# Patient Record
Sex: FEMALE | Race: WHITE | NOT HISPANIC OR LATINO | Employment: OTHER | URBAN - METROPOLITAN AREA
[De-identification: names, ages, dates, MRNs, and addresses within clinical notes are randomized per-mention and may not be internally consistent; named-entity substitution may affect disease eponyms.]

---

## 2023-04-24 ENCOUNTER — OFFICE VISIT (OUTPATIENT)
Dept: PRIMARY CARE | Facility: CLINIC | Age: 88
End: 2023-04-24
Payer: MEDICARE

## 2023-04-24 ENCOUNTER — LAB (OUTPATIENT)
Dept: LAB | Facility: LAB | Age: 88
End: 2023-04-24
Payer: MEDICARE

## 2023-04-24 VITALS
OXYGEN SATURATION: 97 % | BODY MASS INDEX: 31.82 KG/M2 | WEIGHT: 179.6 LBS | HEIGHT: 63 IN | SYSTOLIC BLOOD PRESSURE: 130 MMHG | DIASTOLIC BLOOD PRESSURE: 60 MMHG | HEART RATE: 50 BPM

## 2023-04-24 DIAGNOSIS — Z00.00 MEDICARE ANNUAL WELLNESS VISIT, SUBSEQUENT: Primary | ICD-10-CM

## 2023-04-24 DIAGNOSIS — Z13.89 ENCOUNTER FOR SCREENING FOR OTHER DISORDER: ICD-10-CM

## 2023-04-24 DIAGNOSIS — H61.23 BILATERAL IMPACTED CERUMEN: ICD-10-CM

## 2023-04-24 DIAGNOSIS — Z00.00 ROUTINE GENERAL MEDICAL EXAMINATION AT HEALTH CARE FACILITY: ICD-10-CM

## 2023-04-24 DIAGNOSIS — I10 PRIMARY HYPERTENSION: ICD-10-CM

## 2023-04-24 DIAGNOSIS — R26.89 BALANCE DISORDER: ICD-10-CM

## 2023-04-24 DIAGNOSIS — R53.83 FATIGUE, UNSPECIFIED TYPE: ICD-10-CM

## 2023-04-24 DIAGNOSIS — E66.09 CLASS 1 OBESITY DUE TO EXCESS CALORIES WITH SERIOUS COMORBIDITY AND BODY MASS INDEX (BMI) OF 31.0 TO 31.9 IN ADULT: ICD-10-CM

## 2023-04-24 PROBLEM — N32.81 OVERACTIVE BLADDER: Status: ACTIVE | Noted: 2023-04-24

## 2023-04-24 PROBLEM — M19.90 OSTEOARTHRITIS: Status: ACTIVE | Noted: 2023-04-24

## 2023-04-24 PROBLEM — H26.499 POSTERIOR CAPSULAR OPACIFICATION, OBSCURING VISION: Status: ACTIVE | Noted: 2023-04-24

## 2023-04-24 PROBLEM — R32 URINARY INCONTINENCE: Status: ACTIVE | Noted: 2023-04-24

## 2023-04-24 PROBLEM — R35.1 NOCTURIA: Status: ACTIVE | Noted: 2023-04-24

## 2023-04-24 PROBLEM — E78.5 HYPERLIPIDEMIA: Status: ACTIVE | Noted: 2023-04-24

## 2023-04-24 PROBLEM — M77.8 TENDONITIS OF SHOULDER: Status: ACTIVE | Noted: 2023-04-24

## 2023-04-24 PROBLEM — H40.1190 POAG (PRIMARY OPEN-ANGLE GLAUCOMA): Status: ACTIVE | Noted: 2023-04-24

## 2023-04-24 PROBLEM — M81.0 OSTEOPOROSIS, SENILE: Status: ACTIVE | Noted: 2023-04-24

## 2023-04-24 PROBLEM — M62.81 MUSCLE WEAKNESS: Status: ACTIVE | Noted: 2023-04-24

## 2023-04-24 PROBLEM — N18.31 CHRONIC KIDNEY DISEASE, STAGE 3A (MULTI): Status: RESOLVED | Noted: 2023-04-24 | Resolved: 2023-04-24

## 2023-04-24 PROBLEM — N18.31 CHRONIC KIDNEY DISEASE, STAGE 3A (MULTI): Status: ACTIVE | Noted: 2023-04-24

## 2023-04-24 PROBLEM — M25.519 SHOULDER PAIN: Status: ACTIVE | Noted: 2023-04-24

## 2023-04-24 PROBLEM — I63.9 CEREBRAL INFARCTION (MULTI): Status: ACTIVE | Noted: 2023-04-24

## 2023-04-24 PROBLEM — M48.00 SPINAL STENOSIS: Status: ACTIVE | Noted: 2023-04-24

## 2023-04-24 PROBLEM — H91.90 HEARING LOSS: Status: ACTIVE | Noted: 2023-04-24

## 2023-04-24 PROBLEM — M54.50 LOW BACK PAIN: Status: ACTIVE | Noted: 2023-04-24

## 2023-04-24 PROBLEM — H33.312: Status: ACTIVE | Noted: 2023-04-24

## 2023-04-24 PROBLEM — H40.1131 CHRONIC OPEN ANGLE GLAUCOMA OF BOTH EYES, MILD STAGE: Status: ACTIVE | Noted: 2023-04-24

## 2023-04-24 PROBLEM — H33.319 RETINAL TEAR: Status: ACTIVE | Noted: 2023-04-24

## 2023-04-24 PROBLEM — R79.9 ABNORMAL BLOOD CHEMISTRY: Status: ACTIVE | Noted: 2023-04-24

## 2023-04-24 PROBLEM — Z97.3 WEARS READING EYEGLASSES: Status: ACTIVE | Noted: 2023-04-24

## 2023-04-24 PROBLEM — M79.89 SOFT TISSUE MASS: Status: ACTIVE | Noted: 2023-04-24

## 2023-04-24 PROBLEM — H26.499 AFTER-CATARACT WITH VISION OBSCURED: Status: ACTIVE | Noted: 2023-04-24

## 2023-04-24 PROBLEM — K21.9 ESOPHAGEAL REFLUX: Status: ACTIVE | Noted: 2023-04-24

## 2023-04-24 PROBLEM — I44.7 LEFT BUNDLE-BRANCH BLOCK: Status: ACTIVE | Noted: 2023-04-24

## 2023-04-24 PROCEDURE — 36415 COLL VENOUS BLD VENIPUNCTURE: CPT

## 2023-04-24 PROCEDURE — 1170F FXNL STATUS ASSESSED: CPT | Performed by: PHYSICIAN ASSISTANT

## 2023-04-24 PROCEDURE — G0444 DEPRESSION SCREEN ANNUAL: HCPCS | Performed by: PHYSICIAN ASSISTANT

## 2023-04-24 PROCEDURE — G0439 PPPS, SUBSEQ VISIT: HCPCS | Performed by: PHYSICIAN ASSISTANT

## 2023-04-24 PROCEDURE — 85025 COMPLETE CBC W/AUTO DIFF WBC: CPT

## 2023-04-24 PROCEDURE — 84443 ASSAY THYROID STIM HORMONE: CPT

## 2023-04-24 PROCEDURE — 83718 ASSAY OF LIPOPROTEIN: CPT

## 2023-04-24 PROCEDURE — 99397 PER PM REEVAL EST PAT 65+ YR: CPT | Performed by: PHYSICIAN ASSISTANT

## 2023-04-24 PROCEDURE — 3078F DIAST BP <80 MM HG: CPT | Performed by: PHYSICIAN ASSISTANT

## 2023-04-24 PROCEDURE — 1033F TOBACCO NONSMOKER NOR 2NDHND: CPT | Performed by: PHYSICIAN ASSISTANT

## 2023-04-24 PROCEDURE — 3075F SYST BP GE 130 - 139MM HG: CPT | Performed by: PHYSICIAN ASSISTANT

## 2023-04-24 PROCEDURE — 1036F TOBACCO NON-USER: CPT | Performed by: PHYSICIAN ASSISTANT

## 2023-04-24 PROCEDURE — 1160F RVW MEDS BY RX/DR IN RCRD: CPT | Performed by: PHYSICIAN ASSISTANT

## 2023-04-24 PROCEDURE — 80053 COMPREHEN METABOLIC PANEL: CPT

## 2023-04-24 PROCEDURE — 1159F MED LIST DOCD IN RCRD: CPT | Performed by: PHYSICIAN ASSISTANT

## 2023-04-24 PROCEDURE — 1123F ACP DISCUSS/DSCN MKR DOCD: CPT | Performed by: PHYSICIAN ASSISTANT

## 2023-04-24 PROCEDURE — 82465 ASSAY BLD/SERUM CHOLESTEROL: CPT

## 2023-04-24 RX ORDER — OMEPRAZOLE 20 MG/1
1 CAPSULE, DELAYED RELEASE ORAL DAILY
COMMUNITY
End: 2023-11-18 | Stop reason: HOSPADM

## 2023-04-24 RX ORDER — BIMATOPROST 0.1 MG/ML
1 SOLUTION/ DROPS OPHTHALMIC NIGHTLY
COMMUNITY

## 2023-04-24 RX ORDER — ASPIRIN 81 MG/1
1 TABLET ORAL DAILY
COMMUNITY
Start: 2013-07-17

## 2023-04-24 RX ORDER — FERROUS SULFATE 325(65) MG
1 TABLET ORAL DAILY
COMMUNITY
Start: 2019-05-14 | End: 2023-11-18 | Stop reason: HOSPADM

## 2023-04-24 RX ORDER — AMLODIPINE BESYLATE 5 MG/1
1 TABLET ORAL DAILY
COMMUNITY
Start: 2016-11-01 | End: 2023-09-24 | Stop reason: ALTCHOICE

## 2023-04-24 RX ORDER — DORZOLAMIDE/TIMOLOL/PF 2 %-0.5 %
DROPS OPHTHALMIC (EYE)
COMMUNITY
End: 2023-11-01

## 2023-04-24 RX ORDER — LISINOPRIL AND HYDROCHLOROTHIAZIDE 20; 25 MG/1; MG/1
1 TABLET ORAL DAILY
COMMUNITY
Start: 2019-11-20 | End: 2023-08-14 | Stop reason: HOSPADM

## 2023-04-24 RX ORDER — CALCIUM CARBONATE 600 MG
1 TABLET ORAL DAILY
COMMUNITY
Start: 2019-05-14 | End: 2023-11-18 | Stop reason: HOSPADM

## 2023-04-24 RX ORDER — ATORVASTATIN CALCIUM 40 MG/1
1 TABLET, FILM COATED ORAL DAILY
COMMUNITY
Start: 2013-07-17 | End: 2023-11-18 | Stop reason: HOSPADM

## 2023-04-24 RX ORDER — METOPROLOL SUCCINATE 50 MG/1
50 TABLET, EXTENDED RELEASE ORAL DAILY
COMMUNITY
End: 2023-08-14 | Stop reason: HOSPADM

## 2023-04-24 ASSESSMENT — ENCOUNTER SYMPTOMS
LOSS OF SENSATION IN FEET: 0
DEPRESSION: 0
OCCASIONAL FEELINGS OF UNSTEADINESS: 0

## 2023-04-24 ASSESSMENT — ACTIVITIES OF DAILY LIVING (ADL)
DRESSING: INDEPENDENT
TAKING_MEDICATION: INDEPENDENT
MANAGING_FINANCES: INDEPENDENT
GROCERY_SHOPPING: INDEPENDENT
DOING_HOUSEWORK: INDEPENDENT
BATHING: INDEPENDENT

## 2023-04-24 ASSESSMENT — PATIENT HEALTH QUESTIONNAIRE - PHQ9
1. LITTLE INTEREST OR PLEASURE IN DOING THINGS: NOT AT ALL
SUM OF ALL RESPONSES TO PHQ9 QUESTIONS 1 AND 2: 0
2. FEELING DOWN, DEPRESSED OR HOPELESS: NOT AT ALL

## 2023-04-24 NOTE — PROGRESS NOTES
"  Subjective   Reason for Visit: Angela Ayala is an 89 y.o. female here for a Medicare Wellness visit.     Past Medical, Surgical, and Family History reviewed and updated in chart.    Reviewed all medications by prescribing practitioner or clinical pharmacist (such as prescriptions, OTCs, herbal therapies and supplements) and documented in the medical record.    HPI 89-year-old female presenting for Medicare wellness visit.  Generally doing very well.  No complaints.    HTN, HLD: Compliant with amlodipine 5 mg, lisinopril-HCTZ 20-25 mg, metoprolol succinate 50 mg.  Also on atorvastatin 40 mg and 81 mg ASA.  She does check BP at home, was 117/80/90 this AM. Denies any headache, dizziness, chest pain, SOB/AMBROCIO, palpitations, LE edema.   Follows with cardiology, last seen 1/31/2023.    Glaucoma: Stable on Lumigan and dorzolamide-timolol eyedrops.  Last saw Dr. Jimenez 2/16/2023.    Health maintenance:  Immunizations:  -Flu: Refused  -Pneumococcal: Refused  -Shingrix: Refused  -Tdap: Refused  DEXA due (last 2019)- refused  Has healthcare POA and living well    Last MCR / CPE: 4/24/2023 (MCR ADV)    Patient Care Team:  Linda Prather PA-C as PCP - General (Family Medicine)  Christopher D'Amico, DO as PCP - [employer]vantage PCP     Objective   Vitals:  /60   Pulse 50   Ht 1.6 m (5' 3\")   Wt 81.5 kg (179 lb 9.6 oz)   SpO2 97%   BMI 31.81 kg/m²       Physical Exam  Vitals reviewed.   Constitutional:       General: She is not in acute distress.     Appearance: Normal appearance.   HENT:      Head: Normocephalic and atraumatic.      Right Ear: Tympanic membrane, ear canal and external ear normal. There is impacted cerumen.      Left Ear: Tympanic membrane, ear canal and external ear normal. There is impacted cerumen.      Nose: Nose normal. No congestion or rhinorrhea.      Mouth/Throat:      Mouth: Mucous membranes are moist.      Pharynx: Oropharynx is clear. No oropharyngeal exudate or posterior " oropharyngeal erythema.   Eyes:      General: No scleral icterus.        Right eye: No discharge.         Left eye: No discharge.      Extraocular Movements: Extraocular movements intact.      Conjunctiva/sclera: Conjunctivae normal.      Pupils: Pupils are equal, round, and reactive to light.   Cardiovascular:      Rate and Rhythm: Normal rate and regular rhythm.      Heart sounds: Normal heart sounds. No murmur heard.     No friction rub. No gallop.   Pulmonary:      Effort: Pulmonary effort is normal. No respiratory distress.      Breath sounds: Normal breath sounds. No stridor. No wheezing, rhonchi or rales.   Abdominal:      General: Bowel sounds are normal. There is no distension.      Palpations: Abdomen is soft. There is no mass.      Tenderness: There is no abdominal tenderness. There is no right CVA tenderness or left CVA tenderness.   Musculoskeletal:         General: Normal range of motion.      Cervical back: Normal range of motion and neck supple.      Right lower leg: No edema.      Left lower leg: No edema.   Skin:     General: Skin is warm and dry.      Findings: No rash.   Neurological:      General: No focal deficit present.      Mental Status: She is alert and oriented to person, place, and time. Mental status is at baseline.      Cranial Nerves: No cranial nerve deficit.      Gait: Gait normal.   Psychiatric:         Mood and Affect: Mood normal.         Behavior: Behavior normal.         Assessment/Plan   Problem List Items Addressed This Visit       Balance disorder    Current Assessment & Plan     I recommend ambulating with a walker/rollator for added stability.  Avoid loose cords and rugs in the home which could poses as trip hazards.  Recommend physical therapy for strengthening and balance training, deferred         Fatigue    Relevant Orders    CBC and Auto Differential    Hypertension    Current Assessment & Plan     Well-controlled.  Continue medications unchanged.  Follow strict DASH  diet and exercise regularly as tolerated.  Follow with cardiology         Medicare annual wellness visit, subsequent - Primary    Routine general medical examination at health care facility    Relevant Orders    Lipid Panel Non-Fasting    Comprehensive metabolic panel    CBC and Auto Differential    Tsh With Reflex To Free T4 If Abnormal    Encounter for screening for other disorder    RESOLVED: Class 1 obesity due to excess calories with serious comorbidity and body mass index (BMI) of 31.0 to 31.9 in adult    Bilateral impacted cerumen    Current Assessment & Plan     Patient not interested in having ears flushed at this time.  States she will not use Debrox drops if prescribed.  She is to call the office if she reconsiders

## 2023-04-25 LAB
ALANINE AMINOTRANSFERASE (SGPT) (U/L) IN SER/PLAS: 12 U/L (ref 7–45)
ALBUMIN (G/DL) IN SER/PLAS: 3.8 G/DL (ref 3.4–5)
ALKALINE PHOSPHATASE (U/L) IN SER/PLAS: 100 U/L (ref 33–136)
ANION GAP IN SER/PLAS: 14 MMOL/L (ref 10–20)
ASPARTATE AMINOTRANSFERASE (SGOT) (U/L) IN SER/PLAS: 14 U/L (ref 9–39)
BASOPHILS (10*3/UL) IN BLOOD BY AUTOMATED COUNT: 0.04 X10E9/L (ref 0–0.1)
BASOPHILS/100 LEUKOCYTES IN BLOOD BY AUTOMATED COUNT: 0.6 % (ref 0–2)
BILIRUBIN TOTAL (MG/DL) IN SER/PLAS: 0.5 MG/DL (ref 0–1.2)
CALCIUM (MG/DL) IN SER/PLAS: 9.2 MG/DL (ref 8.6–10.6)
CARBON DIOXIDE, TOTAL (MMOL/L) IN SER/PLAS: 27 MMOL/L (ref 21–32)
CHLORIDE (MMOL/L) IN SER/PLAS: 105 MMOL/L (ref 98–107)
CHOLESTEROL (MG/DL) IN SER/PLAS: 170 MG/DL (ref 0–199)
CHOLESTEROL IN HDL (MG/DL) IN SER/PLAS: 56.9 MG/DL
CHOLESTEROL/HDL RATIO: 3
CREATININE (MG/DL) IN SER/PLAS: 0.95 MG/DL (ref 0.5–1.05)
EOSINOPHILS (10*3/UL) IN BLOOD BY AUTOMATED COUNT: 0.08 X10E9/L (ref 0–0.4)
EOSINOPHILS/100 LEUKOCYTES IN BLOOD BY AUTOMATED COUNT: 1.2 % (ref 0–6)
ERYTHROCYTE DISTRIBUTION WIDTH (RATIO) BY AUTOMATED COUNT: 14.6 % (ref 11.5–14.5)
ERYTHROCYTE MEAN CORPUSCULAR HEMOGLOBIN CONCENTRATION (G/DL) BY AUTOMATED: 32.1 G/DL (ref 32–36)
ERYTHROCYTE MEAN CORPUSCULAR VOLUME (FL) BY AUTOMATED COUNT: 90 FL (ref 80–100)
ERYTHROCYTES (10*6/UL) IN BLOOD BY AUTOMATED COUNT: 4.59 X10E12/L (ref 4–5.2)
GFR FEMALE: 55 ML/MIN/1.73M2
GLUCOSE (MG/DL) IN SER/PLAS: 89 MG/DL (ref 74–99)
HEMATOCRIT (%) IN BLOOD BY AUTOMATED COUNT: 41.4 % (ref 36–46)
HEMOGLOBIN (G/DL) IN BLOOD: 13.3 G/DL (ref 12–16)
IMMATURE GRANULOCYTES/100 LEUKOCYTES IN BLOOD BY AUTOMATED COUNT: 0.1 % (ref 0–0.9)
LEUKOCYTES (10*3/UL) IN BLOOD BY AUTOMATED COUNT: 6.9 X10E9/L (ref 4.4–11.3)
LYMPHOCYTES (10*3/UL) IN BLOOD BY AUTOMATED COUNT: 1.94 X10E9/L (ref 0.8–3)
LYMPHOCYTES/100 LEUKOCYTES IN BLOOD BY AUTOMATED COUNT: 28.2 % (ref 13–44)
MONOCYTES (10*3/UL) IN BLOOD BY AUTOMATED COUNT: 0.58 X10E9/L (ref 0.05–0.8)
MONOCYTES/100 LEUKOCYTES IN BLOOD BY AUTOMATED COUNT: 8.4 % (ref 2–10)
NEUTROPHILS (10*3/UL) IN BLOOD BY AUTOMATED COUNT: 4.24 X10E9/L (ref 1.6–5.5)
NEUTROPHILS/100 LEUKOCYTES IN BLOOD BY AUTOMATED COUNT: 61.5 % (ref 40–80)
NON-HDL CHOLESTEROL: 113 MG/DL
NRBC (PER 100 WBCS) BY AUTOMATED COUNT: 0 /100 WBC (ref 0–0)
PLATELETS (10*3/UL) IN BLOOD AUTOMATED COUNT: 293 X10E9/L (ref 150–450)
POTASSIUM (MMOL/L) IN SER/PLAS: 4.8 MMOL/L (ref 3.5–5.3)
PROTEIN TOTAL: 6 G/DL (ref 6.4–8.2)
SODIUM (MMOL/L) IN SER/PLAS: 141 MMOL/L (ref 136–145)
THYROTROPIN (MIU/L) IN SER/PLAS BY DETECTION LIMIT <= 0.05 MIU/L: 1.96 MIU/L (ref 0.44–3.98)
UREA NITROGEN (MG/DL) IN SER/PLAS: 35 MG/DL (ref 6–23)

## 2023-04-25 NOTE — ASSESSMENT & PLAN NOTE
Patient not interested in having ears flushed at this time.  States she will not use Debrox drops if prescribed.  She is to call the office if she reconsiders

## 2023-04-25 NOTE — ASSESSMENT & PLAN NOTE
I recommend ambulating with a walker/rollator for added stability.  Avoid loose cords and rugs in the home which could poses as trip hazards.  Recommend physical therapy for strengthening and balance training, deferred

## 2023-04-25 NOTE — ASSESSMENT & PLAN NOTE
Well-controlled.  Continue medications unchanged.  Follow strict DASH diet and exercise regularly as tolerated.  Follow with cardiology

## 2023-04-26 NOTE — RESULT ENCOUNTER NOTE
Lab results are back and all look fairly stable  Thyroid levels, cholesterol panel, blood counts all look great.  Kidney function is slightly decreased from prior draw but acceptable for age.  Ensure she is drinking adequate water and not taking excessive NSAIDs.  We will continue to monitor

## 2023-04-28 ENCOUNTER — TELEPHONE (OUTPATIENT)
Dept: PRIMARY CARE | Facility: CLINIC | Age: 88
End: 2023-04-28
Payer: MEDICARE

## 2023-04-28 NOTE — TELEPHONE ENCOUNTER
Left VM to call office for results    ----- Message from Linda Prather PA-C sent at 4/26/2023  7:29 PM EDT -----  Lab results are back and all look fairly stable  Thyroid levels, cholesterol panel, blood counts all look great.  Kidney function is slightly decreased from prior draw but acceptable for age.  Ensure she is drinking adequate water and not taking excessive NSAIDs.  We will continue to monitor

## 2023-05-01 ENCOUNTER — TELEPHONE (OUTPATIENT)
Dept: PRIMARY CARE | Facility: CLINIC | Age: 88
End: 2023-05-01
Payer: MEDICARE

## 2023-05-01 NOTE — TELEPHONE ENCOUNTER
Pt informed of results.    ----- Message from Linda Prather PA-C sent at 4/26/2023  7:29 PM EDT -----  Lab results are back and all look fairly stable  Thyroid levels, cholesterol panel, blood counts all look great.  Kidney function is slightly decreased from prior draw but acceptable for age.  Ensure she is drinking adequate water and not taking excessive NSAIDs.  We will continue to monitor

## 2023-08-14 ENCOUNTER — PATIENT OUTREACH (OUTPATIENT)
Dept: PRIMARY CARE | Facility: CLINIC | Age: 88
End: 2023-08-14
Payer: MEDICARE

## 2023-08-14 DIAGNOSIS — I48.91 ATRIAL FIBRILLATION WITH RVR (MULTI): ICD-10-CM

## 2023-08-14 RX ORDER — METOPROLOL SUCCINATE 100 MG/1
100 TABLET, EXTENDED RELEASE ORAL
Qty: 30 TABLET | Refills: 0 | COMMUNITY
Start: 2023-08-11 | End: 2023-09-24 | Stop reason: SDUPTHER

## 2023-08-14 RX ORDER — LISINOPRIL AND HYDROCHLOROTHIAZIDE 10; 12.5 MG/1; MG/1
1 TABLET ORAL
Qty: 30 TABLET | Refills: 0 | COMMUNITY
Start: 2023-08-11 | End: 2023-10-03 | Stop reason: ALTCHOICE

## 2023-08-14 NOTE — PROGRESS NOTES
Discharge Facility: [facility] [place]  Discharge Diagnosis: Atrial fibrillation with RVR  Admission Date: 8/9/2023  Discharge Date: 8/11/2023    PCP Appointment Date: needs scheduled  Specialist Appointment Date: Cardiology to be scheduled  Hospital Encounter and Summary: Linked  See discharge assessment below for further details    Engagement  Call Start Time: 1243 (8/14/2023 12:37 PM)    Medications  Medications reviewed with patient/caregiver?: Yes (Reviewed medication changes: Eliquis 5 mg twice daily, Metoprolol increased to 100 mg daily. Zetoretic decreased to 10-25 mg daily.) (8/14/2023 12:37 PM)  Is the patient having any side effects they believe may be caused by any medication additions or changes?: No (8/14/2023 12:37 PM)  Does the patient have all medications ordered at discharge?: Yes (8/14/2023 12:37 PM)  Care Management Interventions: No intervention needed (8/14/2023 12:37 PM)  Is the patient taking all medications as directed (includes completed medication regime)?: Yes (8/14/2023 12:37 PM)    Appointments  Does the patient have a primary care provider?: Yes (8/14/2023 12:37 PM)  Care Management Interventions: Verified appointment date/time/provider (Message sent to office to schedule hospital FU. Pt has a scheduled visit on 9/24/2023.) (8/14/2023 12:37 PM)  Has the patient kept scheduled appointments due by today?: Not applicable (8/14/2023 12:37 PM)    Self Management  What is the home health agency?: [facility] (8/14/2023 12:37 PM)  Has home health visited the patient within 72 hours of discharge?: No (Pt was contacted to schedule visit.) (8/14/2023 12:37 PM)  What Durable Medical Equipment (DME) was ordered?: N/A (8/14/2023 12:37 PM)    Patient Teaching  Does the patient have access to their discharge instructions?: Yes (8/14/2023 12:37 PM)  Care Management Interventions: Reviewed instructions with patient (8/14/2023 12:37 PM)  What is the patient's perception of  their health status since discharge?: Improving (8/14/2023 12:37 PM)  Is the patient/caregiver able to teach back the hierarchy of who to call/visit for symptoms/problems? PCP, Specialist, Home Health nurse, Urgent Care, ED, 911: Yes (8/14/2023 12:37 PM)    Wrap Up  Wrap Up Additional Comments: Presented to ED with complaints of fatigue and shortness of breath worse with exertion going on for a week. Denied chest pain, fever, cough. EKG in ED showed atrial fibrillation with RVR with rate 177. Pt treated with cardizem and heparin gtts and cardiology consulted. Discharged to home with [facility]. Metoprolol increased to 100 mg daily. Zetoretic decreased to 10-25 mg daily. Eliquis 5 mg twice daily added. (8/14/2023 12:37 PM)

## 2023-08-28 ENCOUNTER — PATIENT OUTREACH (OUTPATIENT)
Dept: PRIMARY CARE | Facility: CLINIC | Age: 88
End: 2023-08-28
Payer: MEDICARE

## 2023-08-28 NOTE — PROGRESS NOTES
Unable to reach patient for call back after patient's hospitalization. LVM with call back number for patient to call if needed.

## 2023-09-07 PROCEDURE — G0180 MD CERTIFICATION HHA PATIENT: HCPCS | Performed by: PHYSICIAN ASSISTANT

## 2023-09-08 ENCOUNTER — PATIENT OUTREACH (OUTPATIENT)
Dept: PRIMARY CARE | Facility: CLINIC | Age: 88
End: 2023-09-08
Payer: MEDICARE

## 2023-09-08 NOTE — PROGRESS NOTES
Call placed regarding one month post discharge follow up call. At time of outreach call, the patient states she is still having trouble walking and her endurance is poor. She states home care therapist continue to work with her, but she is not making the progress she would like. Reminded pt of upcoming appt with Linda Prather CNP on 9/19/2023 11:40. Pt states she may have to cancel if she is not feeling strong enough to go out.

## 2023-09-19 ENCOUNTER — OFFICE VISIT (OUTPATIENT)
Dept: PRIMARY CARE | Facility: CLINIC | Age: 88
End: 2023-09-19
Payer: MEDICARE

## 2023-09-19 VITALS
WEIGHT: 189 LBS | BODY MASS INDEX: 33.48 KG/M2 | DIASTOLIC BLOOD PRESSURE: 86 MMHG | SYSTOLIC BLOOD PRESSURE: 131 MMHG | HEART RATE: 100 BPM | OXYGEN SATURATION: 95 %

## 2023-09-19 DIAGNOSIS — R60.0 BILATERAL LOWER EXTREMITY EDEMA: ICD-10-CM

## 2023-09-19 DIAGNOSIS — S81.802A LEG WOUND, LEFT, INITIAL ENCOUNTER: ICD-10-CM

## 2023-09-19 DIAGNOSIS — I48.91 ATRIAL FIBRILLATION, UNSPECIFIED TYPE (MULTI): ICD-10-CM

## 2023-09-19 DIAGNOSIS — Z09 HOSPITAL DISCHARGE FOLLOW-UP: Primary | ICD-10-CM

## 2023-09-19 PROBLEM — I63.9 STROKE (MULTI): Status: ACTIVE | Noted: 2023-09-19

## 2023-09-19 PROBLEM — R26.81 GAIT INSTABILITY: Status: ACTIVE | Noted: 2018-05-22

## 2023-09-19 PROBLEM — M81.0 OSTEOPOROSIS: Status: ACTIVE | Noted: 2023-09-19

## 2023-09-19 PROCEDURE — 1160F RVW MEDS BY RX/DR IN RCRD: CPT | Performed by: PHYSICIAN ASSISTANT

## 2023-09-19 PROCEDURE — 99214 OFFICE O/P EST MOD 30 MIN: CPT | Performed by: PHYSICIAN ASSISTANT

## 2023-09-19 PROCEDURE — 1159F MED LIST DOCD IN RCRD: CPT | Performed by: PHYSICIAN ASSISTANT

## 2023-09-19 PROCEDURE — 1036F TOBACCO NON-USER: CPT | Performed by: PHYSICIAN ASSISTANT

## 2023-09-19 PROCEDURE — 1126F AMNT PAIN NOTED NONE PRSNT: CPT | Performed by: PHYSICIAN ASSISTANT

## 2023-09-19 PROCEDURE — 3075F SYST BP GE 130 - 139MM HG: CPT | Performed by: PHYSICIAN ASSISTANT

## 2023-09-19 PROCEDURE — 3079F DIAST BP 80-89 MM HG: CPT | Performed by: PHYSICIAN ASSISTANT

## 2023-09-19 RX ORDER — MUPIROCIN 20 MG/G
OINTMENT TOPICAL 3 TIMES DAILY
Qty: 22 G | Refills: 0 | Status: SHIPPED | OUTPATIENT
Start: 2023-09-19 | End: 2023-09-29

## 2023-09-19 RX ORDER — DORZOLAMIDE HYDROCHLORIDE AND TIMOLOL MALEATE 20; 5 MG/ML; MG/ML
1 SOLUTION/ DROPS OPHTHALMIC
COMMUNITY
Start: 2023-01-26

## 2023-09-19 NOTE — PATIENT INSTRUCTIONS
Concern for fluid overload 2/2 Afib:   -History of elevated BNP (8,640)  -Per [facility] records, chest X-ray clear for pulmonary edema  -No echocardiogram for review  -Encouraged daily weight checks and a low sodium diet  -Prescription for compression stockings provided, [facility]  -Patient to discuss with cardiology today: consider discontinuation of lisinopril- hydrochlorothiazide 10-12.5mg and initiation of lisinopril 10mg AND furosemide 20mg/40mg.     Open wound on LLE:   -Monitor closely for any change (increasing redness, abnormal warmth, pain near skin break, purulent drainage, etc). Call the office at earliest sign of change for urgent referral to  wound care  -Apply topical mupirocin ointment as directed. Prescription sent to pharmacy  -Apply compression stockings daily and remove at night  -Elevate lower extremities when not ambulating  -My office will call [facility] and try to add on wound care. If not able to add on, perform wound changes daily or when dressing is soiled

## 2023-09-19 NOTE — PROGRESS NOTES
Subjective   Patient ID: Angela Ayala is a 89 y.o. female who presents for No chief complaint on file..    HPI 89yo female presenting with her son for a hospital follow up visit. Recall she was recently admitted to [facility] from 8/9/23 - 8/11/23 for shortness of breath and new onset Afib with RVR. While admitted she was followed by cardiology. Eventually was stabilized and discharged. Medication changes were: start eliquis 5mg BID, decrease lisinopril-HCTZ to 10-12.5 mg, and increase metoprolol metoprolol succinate to 100mg.  No echocardiogram was performed while admitted.    Since discharge she admits to feeling weaker than her baseline and short of breath.  Denies any cough, wheezing, chest tightness.  Endorses BLE edema and a LLE wound, which is 2/2 and recent injury.  Per patient's son, her LLE has been weeping a serous/watery fluid since the injury.  Overall the area is improving however today it is slightly more red than it has been previously.  Patient denies any abnormal warmth or pain in the area.  No purulent discharge or bleeding.  No foul odor.  Patient's son has been applying Ace bandage wraps bilaterally since discharge, which typically do help.  He is concerned however that the LLE wound is not healing.    Of note, she did not take any of her medications today.  She is voiding urine and bowels appropriately.    Objective   /86   Pulse 100   Wt 85.7 kg (189 lb)   SpO2 95%   BMI 33.48 kg/m²     Physical Exam  Vitals reviewed.   Constitutional:       General: She is not in acute distress.     Appearance: Normal appearance. She is not ill-appearing.   HENT:      Head: Normocephalic and atraumatic.   Eyes:      General: No scleral icterus.     Extraocular Movements: Extraocular movements intact.      Conjunctiva/sclera: Conjunctivae normal.      Pupils: Pupils are equal, round, and reactive to light.   Cardiovascular:      Rate and Rhythm: Normal rate and regular rhythm.       Heart sounds: Normal heart sounds. No murmur heard.     No friction rub. No gallop.   Pulmonary:      Effort: Pulmonary effort is normal. No respiratory distress.      Breath sounds: Normal breath sounds. No stridor. No wheezing, rhonchi or rales.   Musculoskeletal:      Cervical back: Normal range of motion.      Right lower leg: Edema (2+ pitting) present.      Left lower leg: Edema (2+ pitting) present.   Skin:     General: Skin is warm and dry.      Findings: Lesion (2 small skin breaks to the L anterior shin. Mild surrounding erythma, but no abnormal warmth, tenderness with palpation, or streaking redness. Serous fluid weeping and on ABD pad. No foul odor.) present.   Neurological:      Mental Status: She is alert and oriented to person, place, and time. Mental status is at baseline.      Cranial Nerves: No cranial nerve deficit.      Gait: Gait normal.   Psychiatric:         Mood and Affect: Mood normal.         Behavior: Behavior normal.       Assessment/Plan   Problem List Items Addressed This Visit       Leg wound, left, initial encounter     -Monitor closely for any change (increasing redness, abnormal warmth, pain near skin break, purulent drainage, etc). Call the office at earliest sign of change for urgent referral to  wound care  -Apply topical mupirocin ointment as directed. Prescription sent to pharmacy  -Apply compression stockings daily and remove at night  -Elevate lower extremities when not ambulating  -My office will call [facility] and try to add on wound care. If not able to add on, perform wound changes daily or when dressing is soiled         Relevant Medications    mupirocin (Bactroban) 2 % ointment    Bilateral lower extremity edema     -Apply compression stockings daily and remove at night. [facility]  -Elevate lower extremities when not ambulating  -Limit any sodium intake         Relevant Orders    Compression Stockings 15-20 mmHg    Atrial  fibrillation (CMS/HCC)     Continue eliquis 5mg BID and metoprolol succinate 100mg.    Discussed my concern for fluid overload 2/2 Afib:  -History of elevated BNP (8,640)  -Per [facility] records, chest X-ray clear for pulmonary edema  -No echocardiogram for review  -Encouraged daily weight checks and a low sodium diet  -Prescription for compression stockings provided, [facility]  -Patient to discuss with cardiology today: consider discontinuation of lisinopril- hydrochlorothiazide 10-12.5mg and initiation of lisinopril 10mg AND furosemide 20mg/40mg.          Hospital discharge follow-up - Primary        Follow-up in 1 to 2 months or sooner as needed

## 2023-09-23 NOTE — ASSESSMENT & PLAN NOTE
-Apply compression stockings daily and remove at night. [facility]  -Elevate lower extremities when not ambulating  -Limit any sodium intake

## 2023-09-23 NOTE — ASSESSMENT & PLAN NOTE
Continue eliquis 5mg BID and metoprolol succinate 100mg.    Discussed my concern for fluid overload 2/2 Afib:  -History of elevated BNP (8,640)  -Per [facility] records, chest X-ray clear for pulmonary edema  -No echocardiogram for review  -Encouraged daily weight checks and a low sodium diet  -Prescription for compression stockings provided, [facility]  -Patient to discuss with cardiology today: consider discontinuation of lisinopril- hydrochlorothiazide 10-12.5mg and initiation of lisinopril 10mg AND furosemide 20mg/40mg.

## 2023-09-23 NOTE — ASSESSMENT & PLAN NOTE
-Monitor closely for any change (increasing redness, abnormal warmth, pain near skin break, purulent drainage, etc). Call the office at earliest sign of change for urgent referral to  wound care  -Apply topical mupirocin ointment as directed. Prescription sent to pharmacy  -Apply compression stockings daily and remove at night  -Elevate lower extremities when not ambulating  -My office will call [facility] and try to add on wound care. If not able to add on, perform wound changes daily or when dressing is soiled

## 2023-09-24 DIAGNOSIS — I48.91 ATRIAL FIBRILLATION WITH RVR (MULTI): Primary | ICD-10-CM

## 2023-09-24 RX ORDER — METOPROLOL SUCCINATE 100 MG/1
100 TABLET, EXTENDED RELEASE ORAL
Qty: 90 TABLET | Refills: 1 | Status: SHIPPED | OUTPATIENT
Start: 2023-09-24 | End: 2023-11-18 | Stop reason: HOSPADM

## 2023-09-25 ENCOUNTER — HOSPITAL ENCOUNTER (INPATIENT)
Dept: DATA CONVERSION | Facility: HOSPITAL | Age: 88
LOS: 4 days | Discharge: HOME | End: 2023-09-29
Attending: INTERNAL MEDICINE | Admitting: INTERNAL MEDICINE
Payer: MEDICARE

## 2023-09-25 DIAGNOSIS — R07.9 CHEST PAIN, UNSPECIFIED: ICD-10-CM

## 2023-09-25 LAB
ALBUMIN SERPL-MCNC: 3.5 GM/DL (ref 3.5–5)
ALBUMIN/GLOB SERPL: 1.8 RATIO (ref 1.5–3)
ALP BLD-CCNC: 107 U/L (ref 35–125)
ALT SERPL-CCNC: 21 U/L (ref 5–40)
ANION GAP SERPL CALCULATED.3IONS-SCNC: 12 MMOL/L (ref 0–19)
ANTICOAGULANT: NORMAL
ANTICOAGULANT: NORMAL
APTT PPP: 25.4 SEC (ref 22–32.5)
AST SERPL-CCNC: 24 U/L (ref 5–40)
BASOPHILS # BLD AUTO: 0.03 K/UL (ref 0–0.22)
BASOPHILS NFR BLD AUTO: 0.4 % (ref 0–1)
BILIRUB SERPL-MCNC: 0.7 MG/DL (ref 0.1–1.2)
BUN SERPL-MCNC: 33 MG/DL (ref 8–25)
BUN/CREAT SERPL: 23.6 RATIO (ref 8–21)
CALCIUM SERPL-MCNC: 9.1 MG/DL (ref 8.5–10.4)
CHLORIDE SERPL-SCNC: 99 MMOL/L (ref 97–107)
CO2 SERPL-SCNC: 25 MMOL/L (ref 24–31)
CREAT SERPL-MCNC: 1.4 MG/DL (ref 0.4–1.6)
DEPRECATED RDW RBC AUTO: 54 FL (ref 37–54)
DIFFERENTIAL METHOD BLD: ABNORMAL
EOSINOPHIL # BLD AUTO: 0.07 K/UL (ref 0–0.45)
EOSINOPHIL NFR BLD: 1 % (ref 0–3)
ERYTHROCYTE [DISTWIDTH] IN BLOOD BY AUTOMATED COUNT: 17 % (ref 11.7–15)
GFR SERPL CREATININE-BSD FRML MDRD: 35 ML/MIN/1.73 M2
GLOBULIN SER-MCNC: 2 G/DL (ref 1.9–3.7)
GLUCOSE SERPL-MCNC: 133 MG/DL (ref 65–99)
HCT VFR BLD AUTO: 45.1 % (ref 36–44)
HGB BLD-MCNC: 14.6 GM/DL (ref 12–15)
HS TROPONIN T DELTA: 177 (ref 0–4)
HS TROPONIN T DELTA: ABNORMAL (ref 0–4)
IMM GRANULOCYTES # BLD AUTO: 0.02 K/UL (ref 0–0.1)
INR PPP: 1.1 (ref 0.86–1.16)
LYMPHOCYTES # BLD AUTO: 1.77 K/UL (ref 1.2–3.2)
LYMPHOCYTES NFR BLD MANUAL: 24.8 % (ref 20–40)
MCH RBC QN AUTO: 28.5 PG (ref 26–34)
MCHC RBC AUTO-ENTMCNC: 32.4 % (ref 31–37)
MCV RBC AUTO: 88.1 FL (ref 80–100)
MONOCYTES # BLD AUTO: 0.7 K/UL (ref 0–0.8)
MONOCYTES NFR BLD MANUAL: 9.8 % (ref 0–8)
NEUTROPHILS # BLD AUTO: 4.56 K/UL
NEUTROPHILS # BLD AUTO: 4.56 K/UL (ref 1.8–7.7)
NEUTROPHILS.IMMATURE NFR BLD: 0.3 % (ref 0–1)
NEUTS SEG NFR BLD: 63.7 % (ref 50–70)
NRBC BLD-RTO: 0 /100 WBC
NT-PROBNP SERPL-MCNC: ABNORMAL PG/ML (ref 0–624)
PLATELET # BLD AUTO: 218 K/UL (ref 150–450)
PMV BLD AUTO: 10 CU (ref 7–12.6)
POTASSIUM SERPL-SCNC: 4.7 MMOL/L (ref 3.4–5.1)
PROT SERPL-MCNC: 5.5 G/DL (ref 5.9–7.9)
PROTHROMBIN TIME: 11.4 SEC (ref 9.3–12.7)
RBC # BLD AUTO: 5.12 M/UL (ref 4–4.9)
SODIUM SERPL-SCNC: 136 MMOL/L (ref 133–145)
TROPONIN T SERPL-MCNC: 211 NG/L
TROPONIN T SERPL-MCNC: 34 NG/L
WBC # BLD AUTO: 7.2 K/UL (ref 4.5–11)

## 2023-09-26 ENCOUNTER — APPOINTMENT (OUTPATIENT)
Dept: PRIMARY CARE | Facility: CLINIC | Age: 88
End: 2023-09-26
Payer: MEDICARE

## 2023-09-26 LAB
ALBUMIN SERPL-MCNC: 2.6 GM/DL (ref 3.5–5)
ALBUMIN/GLOB SERPL: 1.1 RATIO (ref 1.5–3)
ALP BLD-CCNC: 87 U/L (ref 35–125)
ALT SERPL-CCNC: 17 U/L (ref 5–40)
ANION GAP SERPL CALCULATED.3IONS-SCNC: 12 MMOL/L (ref 0–19)
APPEARANCE PLAS: CLEAR
AST SERPL-CCNC: 32 U/L (ref 5–40)
BASOPHILS # BLD AUTO: 0.04 K/UL (ref 0–0.22)
BASOPHILS NFR BLD AUTO: 0.6 % (ref 0–1)
BILIRUB SERPL-MCNC: 0.7 MG/DL (ref 0.1–1.2)
BUN SERPL-MCNC: 32 MG/DL (ref 8–25)
BUN/CREAT SERPL: 22.9 RATIO (ref 8–21)
CALCIUM SERPL-MCNC: 8.6 MG/DL (ref 8.5–10.4)
CHLORIDE SERPL-SCNC: 104 MMOL/L (ref 97–107)
CHOLEST SERPL-MCNC: 97 MG/DL (ref 133–200)
CHOLEST/HDLC SERPL: 2.6 RATIO
CO2 SERPL-SCNC: 27 MMOL/L (ref 24–31)
COLOR SPUN FLD: YELLOW
CREAT SERPL-MCNC: 1.4 MG/DL (ref 0.4–1.6)
DEPRECATED RDW RBC AUTO: 55.9 FL (ref 37–54)
DIFFERENTIAL METHOD BLD: ABNORMAL
EOSINOPHIL # BLD AUTO: 0.08 K/UL (ref 0–0.45)
EOSINOPHIL NFR BLD: 1.2 % (ref 0–3)
ERYTHROCYTE [DISTWIDTH] IN BLOOD BY AUTOMATED COUNT: 17.1 % (ref 11.7–15)
FASTING STATUS PATIENT QL REPORTED: ABNORMAL
GFR SERPL CREATININE-BSD FRML MDRD: 35 ML/MIN/1.73 M2
GLOBULIN SER-MCNC: 2.3 G/DL (ref 1.9–3.7)
GLUCOSE SERPL-MCNC: 125 MG/DL (ref 65–99)
HCT VFR BLD AUTO: 44.6 % (ref 36–44)
HDLC SERPL-MCNC: 38 MG/DL
HGB BLD-MCNC: 14.3 GM/DL (ref 12–15)
HS TROPONIN T DELTA: 456 (ref 0–4)
IMM GRANULOCYTES # BLD AUTO: 0.01 K/UL (ref 0–0.1)
LDLC SERPL CALC-MCNC: 45 MG/DL (ref 65–130)
LYMPHOCYTES # BLD AUTO: 1.69 K/UL (ref 1.2–3.2)
LYMPHOCYTES NFR BLD MANUAL: 25.6 % (ref 20–40)
MCH RBC QN AUTO: 28.7 PG (ref 26–34)
MCHC RBC AUTO-ENTMCNC: 32.1 % (ref 31–37)
MCV RBC AUTO: 89.6 FL (ref 80–100)
MONOCYTES # BLD AUTO: 0.74 K/UL (ref 0–0.8)
MONOCYTES NFR BLD MANUAL: 11.2 % (ref 0–8)
NEUTROPHILS # BLD AUTO: 4.05 K/UL
NEUTROPHILS # BLD AUTO: 4.05 K/UL (ref 1.8–7.7)
NEUTROPHILS.IMMATURE NFR BLD: 0.2 % (ref 0–1)
NEUTS SEG NFR BLD: 61.2 % (ref 50–70)
NRBC BLD-RTO: 0 /100 WBC
PLATELET # BLD AUTO: 220 K/UL (ref 150–450)
PMV BLD AUTO: 10.5 CU (ref 7–12.6)
POTASSIUM SERPL-SCNC: 4.5 MMOL/L (ref 3.4–5.1)
PROT SERPL-MCNC: 4.9 G/DL (ref 5.9–7.9)
RBC # BLD AUTO: 4.98 M/UL (ref 4–4.9)
SODIUM SERPL-SCNC: 142 MMOL/L (ref 133–145)
TRIGL SERPL-MCNC: 70 MG/DL (ref 40–150)
TROPONIN T SERPL-MCNC: 667 NG/L
WBC # BLD AUTO: 6.6 K/UL (ref 4.5–11)

## 2023-09-27 LAB
ANION GAP SERPL CALCULATED.3IONS-SCNC: 9 MMOL/L (ref 0–19)
BUN SERPL-MCNC: 39 MG/DL (ref 8–25)
BUN/CREAT SERPL: 26 RATIO (ref 8–21)
CALCIUM SERPL-MCNC: 8.4 MG/DL (ref 8.5–10.4)
CHLORIDE SERPL-SCNC: 102 MMOL/L (ref 97–107)
CO2 SERPL-SCNC: 29 MMOL/L (ref 24–31)
CREAT SERPL-MCNC: 1.5 MG/DL (ref 0.4–1.6)
GFR SERPL CREATININE-BSD FRML MDRD: 32 ML/MIN/1.73 M2
GLUCOSE SERPL-MCNC: 112 MG/DL (ref 65–99)
POTASSIUM SERPL-SCNC: 4.7 MMOL/L (ref 3.4–5.1)
SODIUM SERPL-SCNC: 140 MMOL/L (ref 133–145)

## 2023-09-28 VITALS — HEIGHT: 64 IN | WEIGHT: 191.36 LBS | BODY MASS INDEX: 32.67 KG/M2

## 2023-09-28 LAB
ANION GAP SERPL CALCULATED.3IONS-SCNC: 10 MMOL/L (ref 0–19)
BASOPHILS # BLD AUTO: 0.04 K/UL (ref 0–0.22)
BASOPHILS NFR BLD AUTO: 0.6 % (ref 0–1)
BUN SERPL-MCNC: 47 MG/DL (ref 8–25)
BUN/CREAT SERPL: 29.4 RATIO (ref 8–21)
CALCIUM SERPL-MCNC: 8.2 MG/DL (ref 8.5–10.4)
CHLORIDE SERPL-SCNC: 101 MMOL/L (ref 97–107)
CO2 SERPL-SCNC: 28 MMOL/L (ref 24–31)
CREAT SERPL-MCNC: 1.6 MG/DL (ref 0.4–1.6)
DEPRECATED RDW RBC AUTO: 54.1 FL (ref 37–54)
DIFFERENTIAL METHOD BLD: ABNORMAL
EOSINOPHIL # BLD AUTO: 0.11 K/UL (ref 0–0.45)
EOSINOPHIL NFR BLD: 1.8 % (ref 0–3)
ERYTHROCYTE [DISTWIDTH] IN BLOOD BY AUTOMATED COUNT: 16.7 % (ref 11.7–15)
GFR SERPL CREATININE-BSD FRML MDRD: 30 ML/MIN/1.73 M2
GLUCOSE SERPL-MCNC: 111 MG/DL (ref 65–99)
HCT VFR BLD AUTO: 42.3 % (ref 36–44)
HGB BLD-MCNC: 13.7 GM/DL (ref 12–15)
IMM GRANULOCYTES # BLD AUTO: 0.01 K/UL (ref 0–0.1)
LYMPHOCYTES # BLD AUTO: 1.89 K/UL (ref 1.2–3.2)
LYMPHOCYTES NFR BLD MANUAL: 30.6 % (ref 20–40)
MCH RBC QN AUTO: 28.5 PG (ref 26–34)
MCHC RBC AUTO-ENTMCNC: 32.4 % (ref 31–37)
MCV RBC AUTO: 88.1 FL (ref 80–100)
MONOCYTES # BLD AUTO: 0.44 K/UL (ref 0–0.8)
MONOCYTES NFR BLD MANUAL: 7.1 % (ref 0–8)
NEUTROPHILS # BLD AUTO: 3.68 K/UL
NEUTROPHILS # BLD AUTO: 3.68 K/UL (ref 1.8–7.7)
NEUTROPHILS.IMMATURE NFR BLD: 0.2 % (ref 0–1)
NEUTS SEG NFR BLD: 59.7 % (ref 50–70)
NRBC BLD-RTO: 0 /100 WBC
PLATELET # BLD AUTO: 227 K/UL (ref 150–450)
PMV BLD AUTO: 9.9 CU (ref 7–12.6)
POTASSIUM SERPL-SCNC: 4.7 MMOL/L (ref 3.4–5.1)
RBC # BLD AUTO: 4.8 M/UL (ref 4–4.9)
SODIUM SERPL-SCNC: 139 MMOL/L (ref 133–145)
WBC # BLD AUTO: 6.2 K/UL (ref 4.5–11)

## 2023-09-28 RX ORDER — PANTOPRAZOLE SODIUM 20 MG/1
20 TABLET, DELAYED RELEASE ORAL DAILY
Status: DISCONTINUED | OUTPATIENT
Start: 2023-09-30 | End: 2023-09-29 | Stop reason: HOSPADM

## 2023-09-28 RX ORDER — ADHESIVE BANDAGE
30 BANDAGE TOPICAL NIGHTLY PRN
Status: DISCONTINUED | OUTPATIENT
Start: 2023-09-30 | End: 2023-09-29 | Stop reason: HOSPADM

## 2023-09-28 RX ORDER — NAPROXEN SODIUM 220 MG/1
81 TABLET, FILM COATED ORAL DAILY
Status: DISCONTINUED | OUTPATIENT
Start: 2023-09-30 | End: 2023-09-29 | Stop reason: HOSPADM

## 2023-09-28 RX ORDER — NITROGLYCERIN 0.4 MG/1
0.4 TABLET SUBLINGUAL EVERY 5 MIN PRN
Status: DISCONTINUED | OUTPATIENT
Start: 2023-09-30 | End: 2023-09-29 | Stop reason: HOSPADM

## 2023-09-28 RX ORDER — ALUMINUM HYDROXIDE, MAGNESIUM HYDROXIDE, AND SIMETHICONE 1200; 120; 1200 MG/30ML; MG/30ML; MG/30ML
30 SUSPENSION ORAL EVERY 4 HOURS PRN
Status: DISCONTINUED | OUTPATIENT
Start: 2023-09-30 | End: 2023-09-29 | Stop reason: HOSPADM

## 2023-09-28 RX ORDER — DORZOLAMIDE HYDROCHLORIDE AND TIMOLOL MALEATE 20; 5 MG/ML; MG/ML
1 SOLUTION/ DROPS OPHTHALMIC 2 TIMES DAILY
Status: DISCONTINUED | OUTPATIENT
Start: 2023-09-30 | End: 2023-09-29 | Stop reason: HOSPADM

## 2023-09-28 RX ORDER — ATORVASTATIN CALCIUM 40 MG/1
40 TABLET, FILM COATED ORAL NIGHTLY
Status: DISCONTINUED | OUTPATIENT
Start: 2023-09-30 | End: 2023-09-29 | Stop reason: HOSPADM

## 2023-09-28 RX ORDER — ONDANSETRON 4 MG/1
4 TABLET, ORALLY DISINTEGRATING ORAL EVERY 6 HOURS PRN
Status: DISCONTINUED | OUTPATIENT
Start: 2023-09-30 | End: 2023-09-29 | Stop reason: HOSPADM

## 2023-09-28 RX ORDER — ACETAMINOPHEN 325 MG/1
650 TABLET ORAL EVERY 4 HOURS PRN
Status: DISCONTINUED | OUTPATIENT
Start: 2023-09-30 | End: 2023-09-29 | Stop reason: HOSPADM

## 2023-09-28 RX ORDER — LATANOPROST 50 UG/ML
1 SOLUTION/ DROPS OPHTHALMIC NIGHTLY
Status: DISCONTINUED | OUTPATIENT
Start: 2023-09-30 | End: 2023-09-29 | Stop reason: HOSPADM

## 2023-09-28 RX ORDER — MORPHINE SULFATE 2 MG/ML
2 INJECTION, SOLUTION INTRAMUSCULAR; INTRAVENOUS EVERY 5 MIN PRN
Status: DISCONTINUED | OUTPATIENT
Start: 2023-09-30 | End: 2023-09-29 | Stop reason: WASHOUT

## 2023-09-28 RX ORDER — BISACODYL 5 MG
5 TABLET, DELAYED RELEASE (ENTERIC COATED) ORAL DAILY PRN
Status: DISCONTINUED | OUTPATIENT
Start: 2023-09-30 | End: 2023-09-29 | Stop reason: HOSPADM

## 2023-09-28 RX ORDER — ONDANSETRON HYDROCHLORIDE 2 MG/ML
4 INJECTION, SOLUTION INTRAVENOUS EVERY 6 HOURS PRN
Status: DISCONTINUED | OUTPATIENT
Start: 2023-09-30 | End: 2023-09-29 | Stop reason: HOSPADM

## 2023-09-28 RX ORDER — GUAIFENESIN 100 MG/5ML
100 SOLUTION ORAL EVERY 4 HOURS PRN
Status: DISCONTINUED | OUTPATIENT
Start: 2023-09-30 | End: 2023-09-29 | Stop reason: HOSPADM

## 2023-09-28 RX ORDER — AMIODARONE HYDROCHLORIDE 200 MG/1
200 TABLET ORAL 2 TIMES DAILY
Status: DISCONTINUED | OUTPATIENT
Start: 2023-09-30 | End: 2023-09-29 | Stop reason: HOSPADM

## 2023-09-28 RX ORDER — FUROSEMIDE 40 MG/1
40 TABLET ORAL DAILY
Status: DISCONTINUED | OUTPATIENT
Start: 2023-09-30 | End: 2023-09-29 | Stop reason: HOSPADM

## 2023-09-29 LAB
ANION GAP SERPL CALCULATED.3IONS-SCNC: 10 MMOL/L (ref 0–19)
BUN SERPL-MCNC: 57 MG/DL (ref 8–25)
BUN/CREAT SERPL: 38 RATIO (ref 8–21)
CALCIUM SERPL-MCNC: 8.2 MG/DL (ref 8.5–10.4)
CHLORIDE SERPL-SCNC: 100 MMOL/L (ref 97–107)
CO2 SERPL-SCNC: 28 MMOL/L (ref 24–31)
CREAT SERPL-MCNC: 1.5 MG/DL (ref 0.4–1.6)
GFR SERPL CREATININE-BSD FRML MDRD: 32 ML/MIN/1.73 M2
GLUCOSE SERPL-MCNC: 103 MG/DL (ref 65–99)
POTASSIUM SERPL-SCNC: 4.5 MMOL/L (ref 3.4–5.1)
SODIUM SERPL-SCNC: 137 MMOL/L (ref 133–145)

## 2023-09-29 RX ORDER — METOPROLOL SUCCINATE 50 MG/1
50 TABLET, EXTENDED RELEASE ORAL 2 TIMES DAILY
Status: DISCONTINUED | OUTPATIENT
Start: 2023-09-30 | End: 2023-09-29 | Stop reason: HOSPADM

## 2023-10-01 PROBLEM — I48.91 ATRIAL FIBRILLATION WITH RVR (MULTI): Status: ACTIVE | Noted: 2023-08-09

## 2023-10-01 PROBLEM — Z86.73 HISTORY OF CEREBROVASCULAR ACCIDENT: Status: ACTIVE | Noted: 2023-08-10

## 2023-10-01 PROBLEM — E66.9 OBESITY, CLASS I, BMI 30-34.9: Status: ACTIVE | Noted: 2023-08-10

## 2023-10-01 RX ORDER — FUROSEMIDE 40 MG/1
40 TABLET ORAL DAILY
COMMUNITY
Start: 2023-09-19 | End: 2023-11-01 | Stop reason: SDUPTHER

## 2023-10-01 RX ORDER — LISINOPRIL 20 MG/1
20 TABLET ORAL DAILY
COMMUNITY
Start: 2023-09-19 | End: 2023-10-03 | Stop reason: ALTCHOICE

## 2023-10-03 ENCOUNTER — LAB (OUTPATIENT)
Dept: LAB | Facility: LAB | Age: 88
End: 2023-10-03
Payer: MEDICARE

## 2023-10-03 ENCOUNTER — OFFICE VISIT (OUTPATIENT)
Dept: CARDIOLOGY | Facility: CLINIC | Age: 88
End: 2023-10-03
Payer: MEDICARE

## 2023-10-03 ENCOUNTER — APPOINTMENT (OUTPATIENT)
Dept: CARDIOLOGY | Facility: CLINIC | Age: 88
End: 2023-10-03
Payer: MEDICARE

## 2023-10-03 ENCOUNTER — PATIENT OUTREACH (OUTPATIENT)
Dept: PRIMARY CARE | Facility: CLINIC | Age: 88
End: 2023-10-03
Payer: MEDICARE

## 2023-10-03 VITALS
HEIGHT: 63 IN | DIASTOLIC BLOOD PRESSURE: 70 MMHG | HEART RATE: 88 BPM | BODY MASS INDEX: 33.12 KG/M2 | OXYGEN SATURATION: 98 % | WEIGHT: 186.9 LBS | SYSTOLIC BLOOD PRESSURE: 104 MMHG

## 2023-10-03 DIAGNOSIS — I48.21 PERMANENT ATRIAL FIBRILLATION (MULTI): ICD-10-CM

## 2023-10-03 DIAGNOSIS — I50.9 ACUTE ON CHRONIC CONGESTIVE HEART FAILURE, UNSPECIFIED HEART FAILURE TYPE (MULTI): Primary | ICD-10-CM

## 2023-10-03 DIAGNOSIS — I48.3 TYPICAL ATRIAL FLUTTER (MULTI): ICD-10-CM

## 2023-10-03 DIAGNOSIS — I50.9 ACUTE ON CHRONIC CONGESTIVE HEART FAILURE, UNSPECIFIED HEART FAILURE TYPE (MULTI): ICD-10-CM

## 2023-10-03 DIAGNOSIS — I48.21 PERMANENT ATRIAL FIBRILLATION (MULTI): Primary | ICD-10-CM

## 2023-10-03 DIAGNOSIS — R07.9 CHEST PAIN, UNSPECIFIED TYPE: ICD-10-CM

## 2023-10-03 PROCEDURE — 1036F TOBACCO NON-USER: CPT | Performed by: INTERNAL MEDICINE

## 2023-10-03 PROCEDURE — 1126F AMNT PAIN NOTED NONE PRSNT: CPT | Performed by: INTERNAL MEDICINE

## 2023-10-03 PROCEDURE — 36415 COLL VENOUS BLD VENIPUNCTURE: CPT

## 2023-10-03 PROCEDURE — 3074F SYST BP LT 130 MM HG: CPT | Performed by: INTERNAL MEDICINE

## 2023-10-03 PROCEDURE — 3078F DIAST BP <80 MM HG: CPT | Performed by: INTERNAL MEDICINE

## 2023-10-03 PROCEDURE — 1160F RVW MEDS BY RX/DR IN RCRD: CPT | Performed by: INTERNAL MEDICINE

## 2023-10-03 PROCEDURE — 99214 OFFICE O/P EST MOD 30 MIN: CPT | Performed by: INTERNAL MEDICINE

## 2023-10-03 PROCEDURE — 1159F MED LIST DOCD IN RCRD: CPT | Performed by: INTERNAL MEDICINE

## 2023-10-03 PROCEDURE — 80048 BASIC METABOLIC PNL TOTAL CA: CPT

## 2023-10-03 RX ORDER — AMIODARONE HYDROCHLORIDE 200 MG/1
200 TABLET ORAL DAILY
COMMUNITY
Start: 2023-09-29

## 2023-10-03 RX ORDER — LISINOPRIL 10 MG/1
10 TABLET ORAL DAILY
Qty: 30 TABLET | Refills: 11 | Status: CANCELLED | OUTPATIENT
Start: 2023-10-03 | End: 2024-10-02

## 2023-10-03 RX ORDER — LOSARTAN POTASSIUM 25 MG/1
25 TABLET ORAL ONCE
Status: DISCONTINUED | OUTPATIENT
Start: 2023-10-03 | End: 2023-11-01

## 2023-10-03 ASSESSMENT — PAIN SCALES - GENERAL: PAINLEVEL: 0-NO PAIN

## 2023-10-03 NOTE — PROGRESS NOTES
Discharge Facility: [facility]  Discharge Diagnosis: Chest pain, unspecified  Admission Date: 9/25/2023  Discharge Date: 9/29/2023    PCP Appointment Date: Not scheduled  Specialist Appointment Date: 10/3/2023 14:30 Dr. Kwame Johnson, Cardiology  Hospital Encounter and Summary: Linked     **Unable to reach pt to completed post discharge assessment. See brief summary below:  Wrap Up  Wrap Up Additional Comments: Pt admitted for chest pain. Pt recently admitted with new onset Afib with RVR 8/9/2023 to 8/11/2023. Cardiology consulted both admissions. Echo this admission showed ejection fraction severely decreased at 25-30%. Pt started on amiodarone 200 mg twice daily. Lasix and lisonopril not ordered on discharge med list. Pt discharged to home self care with family support and [facility] care to follow. (10/3/2023 11:48 AM)

## 2023-10-04 ENCOUNTER — TELEPHONE (OUTPATIENT)
Dept: INTENSIVE CARE | Facility: HOSPITAL | Age: 88
End: 2023-10-04

## 2023-10-04 LAB
ANION GAP SERPL CALC-SCNC: 18 MMOL/L (ref 10–20)
BUN SERPL-MCNC: 51 MG/DL (ref 6–23)
CALCIUM SERPL-MCNC: 9.6 MG/DL (ref 8.6–10.6)
CHLORIDE SERPL-SCNC: 102 MMOL/L (ref 98–107)
CO2 SERPL-SCNC: 25 MMOL/L (ref 21–32)
CREAT SERPL-MCNC: 1.62 MG/DL (ref 0.5–1.05)
GFR SERPL CREATININE-BSD FRML MDRD: 29 ML/MIN/1.73M*2
GLUCOSE SERPL-MCNC: 122 MG/DL (ref 74–99)
POTASSIUM SERPL-SCNC: 5 MMOL/L (ref 3.5–5.3)
SODIUM SERPL-SCNC: 140 MMOL/L (ref 136–145)

## 2023-10-06 ENCOUNTER — PATIENT MESSAGE (OUTPATIENT)
Dept: CARDIOLOGY | Facility: CLINIC | Age: 88
End: 2023-10-06
Payer: MEDICARE

## 2023-10-06 DIAGNOSIS — I50.9 ACUTE ON CHRONIC CONGESTIVE HEART FAILURE, UNSPECIFIED HEART FAILURE TYPE (MULTI): ICD-10-CM

## 2023-10-06 NOTE — PROGRESS NOTES
Subjective   Angela Ayala is a 89 y.o. female.    Chief Complaint:  Hospital Follow-up            Objective       Lab Review:       Assessment/Plan   The primary encounter diagnosis was Acute on chronic congestive heart failure, unspecified heart failure type (CMS/HCC). A diagnosis of Permanent atrial fibrillation (CMS/HCC) was also pertinent to this visit.

## 2023-10-10 RX ORDER — LOSARTAN POTASSIUM 25 MG/1
25 TABLET ORAL ONCE
Status: CANCELLED | OUTPATIENT
Start: 2023-10-10 | End: 2023-10-10

## 2023-10-11 ENCOUNTER — PHARMACY VISIT (OUTPATIENT)
Dept: PHARMACY | Facility: CLINIC | Age: 88
End: 2023-10-11
Payer: COMMERCIAL

## 2023-10-11 PROCEDURE — RXMED WILLOW AMBULATORY MEDICATION CHARGE

## 2023-10-11 RX ORDER — LOSARTAN POTASSIUM 25 MG/1
25 TABLET ORAL DAILY
Qty: 30 TABLET | Refills: 11 | Status: SHIPPED | OUTPATIENT
Start: 2023-10-11 | End: 2023-10-19 | Stop reason: SDUPTHER

## 2023-10-18 ENCOUNTER — APPOINTMENT (OUTPATIENT)
Dept: RADIOLOGY | Facility: HOSPITAL | Age: 88
End: 2023-10-18
Payer: MEDICARE

## 2023-10-18 ENCOUNTER — HOSPITAL ENCOUNTER (EMERGENCY)
Facility: HOSPITAL | Age: 88
Discharge: HOME | End: 2023-10-18
Attending: EMERGENCY MEDICINE
Payer: MEDICARE

## 2023-10-18 VITALS
HEIGHT: 63 IN | DIASTOLIC BLOOD PRESSURE: 76 MMHG | HEART RATE: 54 BPM | BODY MASS INDEX: 33.49 KG/M2 | WEIGHT: 189 LBS | RESPIRATION RATE: 16 BRPM | TEMPERATURE: 97.5 F | SYSTOLIC BLOOD PRESSURE: 115 MMHG | OXYGEN SATURATION: 97 %

## 2023-10-18 DIAGNOSIS — L03.115 CELLULITIS OF RIGHT LOWER EXTREMITY: Primary | ICD-10-CM

## 2023-10-18 LAB
ANION GAP SERPL CALC-SCNC: 9 MMOL/L
BASOPHILS # BLD AUTO: 0.04 X10*3/UL (ref 0–0.1)
BASOPHILS NFR BLD AUTO: 0.7 %
BUN SERPL-MCNC: 35 MG/DL (ref 8–25)
CALCIUM SERPL-MCNC: 8.9 MG/DL (ref 8.5–10.4)
CHLORIDE SERPL-SCNC: 98 MMOL/L (ref 97–107)
CO2 SERPL-SCNC: 29 MMOL/L (ref 24–31)
CREAT SERPL-MCNC: 1.5 MG/DL (ref 0.4–1.6)
EOSINOPHIL # BLD AUTO: 0.12 X10*3/UL (ref 0–0.4)
EOSINOPHIL NFR BLD AUTO: 2.1 %
ERYTHROCYTE [DISTWIDTH] IN BLOOD BY AUTOMATED COUNT: 18.1 % (ref 11.5–14.5)
GFR SERPL CREATININE-BSD FRML MDRD: 32 ML/MIN/1.73M*2
GLUCOSE SERPL-MCNC: 117 MG/DL (ref 65–99)
HCT VFR BLD AUTO: 45.3 % (ref 36–46)
HGB BLD-MCNC: 14.4 G/DL (ref 12–16)
IMM GRANULOCYTES # BLD AUTO: 0.01 X10*3/UL (ref 0–0.5)
IMM GRANULOCYTES NFR BLD AUTO: 0.2 % (ref 0–0.9)
LYMPHOCYTES # BLD AUTO: 1.16 X10*3/UL (ref 0.8–3)
LYMPHOCYTES NFR BLD AUTO: 19.9 %
MCH RBC QN AUTO: 28.4 PG (ref 26–34)
MCHC RBC AUTO-ENTMCNC: 31.8 G/DL (ref 32–36)
MCV RBC AUTO: 89 FL (ref 80–100)
MONOCYTES # BLD AUTO: 0.52 X10*3/UL (ref 0.05–0.8)
MONOCYTES NFR BLD AUTO: 8.9 %
NEUTROPHILS # BLD AUTO: 3.99 X10*3/UL (ref 1.6–5.5)
NEUTROPHILS NFR BLD AUTO: 68.2 %
NRBC BLD-RTO: 0 /100 WBCS (ref 0–0)
PLATELET # BLD AUTO: 268 X10*3/UL (ref 150–450)
PMV BLD AUTO: 10.5 FL (ref 7.5–11.5)
POTASSIUM SERPL-SCNC: 5 MMOL/L (ref 3.4–5.1)
RBC # BLD AUTO: 5.07 X10*6/UL (ref 4–5.2)
SODIUM SERPL-SCNC: 136 MMOL/L (ref 133–145)
WBC # BLD AUTO: 5.8 X10*3/UL (ref 4.4–11.3)

## 2023-10-18 PROCEDURE — 85025 COMPLETE CBC W/AUTO DIFF WBC: CPT | Performed by: EMERGENCY MEDICINE

## 2023-10-18 PROCEDURE — 2500000001 HC RX 250 WO HCPCS SELF ADMINISTERED DRUGS (ALT 637 FOR MEDICARE OP)

## 2023-10-18 PROCEDURE — 93970 EXTREMITY STUDY: CPT

## 2023-10-18 PROCEDURE — 36415 COLL VENOUS BLD VENIPUNCTURE: CPT | Performed by: EMERGENCY MEDICINE

## 2023-10-18 PROCEDURE — 80048 BASIC METABOLIC PNL TOTAL CA: CPT | Performed by: EMERGENCY MEDICINE

## 2023-10-18 PROCEDURE — 99284 EMERGENCY DEPT VISIT MOD MDM: CPT | Mod: 25 | Performed by: EMERGENCY MEDICINE

## 2023-10-18 RX ORDER — CEPHALEXIN 500 MG/1
500 CAPSULE ORAL ONCE
Status: COMPLETED | OUTPATIENT
Start: 2023-10-18 | End: 2023-10-18

## 2023-10-18 RX ORDER — CEPHALEXIN 500 MG/1
500 CAPSULE ORAL 4 TIMES DAILY
Qty: 40 CAPSULE | Refills: 0 | Status: SHIPPED | OUTPATIENT
Start: 2023-10-18 | End: 2023-11-01

## 2023-10-18 RX ADMIN — CEPHALEXIN 500 MG: 500 CAPSULE ORAL at 22:52

## 2023-10-18 ASSESSMENT — PAIN - FUNCTIONAL ASSESSMENT: PAIN_FUNCTIONAL_ASSESSMENT: 0-10

## 2023-10-18 ASSESSMENT — PAIN SCALES - GENERAL: PAINLEVEL_OUTOF10: 0 - NO PAIN

## 2023-10-18 ASSESSMENT — COLUMBIA-SUICIDE SEVERITY RATING SCALE - C-SSRS
6. HAVE YOU EVER DONE ANYTHING, STARTED TO DO ANYTHING, OR PREPARED TO DO ANYTHING TO END YOUR LIFE?: NO
1. IN THE PAST MONTH, HAVE YOU WISHED YOU WERE DEAD OR WISHED YOU COULD GO TO SLEEP AND NOT WAKE UP?: NO
2. HAVE YOU ACTUALLY HAD ANY THOUGHTS OF KILLING YOURSELF?: NO

## 2023-10-18 ASSESSMENT — LIFESTYLE VARIABLES
EVER FELT BAD OR GUILTY ABOUT YOUR DRINKING: NO
REASON UNABLE TO ASSESS: NO
HAVE PEOPLE ANNOYED YOU BY CRITICIZING YOUR DRINKING: NO
EVER HAD A DRINK FIRST THING IN THE MORNING TO STEADY YOUR NERVES TO GET RID OF A HANGOVER: NO

## 2023-10-19 ENCOUNTER — PATIENT OUTREACH (OUTPATIENT)
Dept: PRIMARY CARE | Facility: CLINIC | Age: 88
End: 2023-10-19
Payer: MEDICARE

## 2023-10-19 DIAGNOSIS — I50.9 ACUTE ON CHRONIC CONGESTIVE HEART FAILURE, UNSPECIFIED HEART FAILURE TYPE (MULTI): ICD-10-CM

## 2023-10-19 RX ORDER — LOSARTAN POTASSIUM 25 MG/1
25 TABLET ORAL DAILY
Qty: 90 TABLET | Refills: 3 | Status: SHIPPED | OUTPATIENT
Start: 2023-10-19 | End: 2023-11-01

## 2023-10-19 RX ORDER — LOSARTAN POTASSIUM 25 MG/1
25 TABLET ORAL DAILY
Qty: 30 TABLET | Refills: 11 | Status: SHIPPED | OUTPATIENT
Start: 2023-10-19 | End: 2023-11-18 | Stop reason: HOSPADM

## 2023-10-19 NOTE — PROGRESS NOTES
Unable to reach patient for call back after patient's recent visit to the emergency room. [facility] with call back number for patient to call if needed.    10/20/2023 09:06 - Received return phone message from patient's daughter-in-law. DIL states the patient is concerned her leg is worsening. She did start the antibiotic prescribed from her emergency room visit. Also stated, the patient was discharged to home with an IV left in her arm and she is wondering if whoever is coming from home care today can take it out. Called patient to follow up and left message stating I would call [facility] to ask them to send a nurse. Called [facility] and notified them of the patient's request. PT is scheduled for today, but they will also try to send a nurse.

## 2023-10-19 NOTE — ED TRIAGE NOTES
Patient was brought to the ED with c/o Bilateral Leg Swelling and seeping/redness. Patient states seeing a wound specialist yesterday and was told to come to the ED to be checked out for cellulitis. Patient AO x 3, RR e/unlabored.

## 2023-10-19 NOTE — ED PROVIDER NOTES
HPI   Chief Complaint   Patient presents with    Leg Swelling       Patient is a 89-year-old female presenting to the emergency department for evaluation of redness to the right leg.  Patient has bilateral lower extremity edema and is seen by the wound clinic weekly.  She was having her dressing changed yesterday when the wound clinic noticed some redness to her right shin.  They were concerned for cellulitis therefore told the patient to go to the emergency department.  Patient denies any pain or injury to the legs.  She denies chest pain, shortness of breath, fever, chills, nausea, vomiting abdominal pain, recent travel, recent illness, headaches, numbness and tingling, urinary symptoms.                          No data recorded                Patient History   Past Medical History:   Diagnosis Date    Age-related nuclear cataract, right eye 10/09/2019    Age-related nuclear cataract, right eye    Age-related nuclear cataract, right eye 10/09/2019    Age-related nuclear cataract of right eye    Other conditions influencing health status     Mammogram    Other conditions influencing health status     DEXA Body Composition Study    Other conditions influencing health status     Colonoscopy (Fiberoptic)    Personal history of other diseases of the nervous system and sense organs 08/12/2019    History of hearing loss    Personal history of other diseases of the nervous system and sense organs 10/09/2019    History of cataract    Personal history of transient ischemic attack (TIA), and cerebral infarction without residual deficits 08/12/2019    History of cerebrovascular accident    Unspecified cataract     Cataract of left eye     Past Surgical History:   Procedure Laterality Date    MR HEAD ANGIO WO IV CONTRAST  4/26/2012    MR HEAD ANGIO WO IV CONTRAST [facility]    OTHER SURGICAL HISTORY  08/27/2014    Laser Suite Retina Treatment Consisted Of    OTHER SURGICAL HISTORY  07/19/2013    Reported Hx Of Knee  Replacement    OTHER SURGICAL HISTORY  07/19/2013    Remove Cataract, Corneo-Scleral Section Left Eye     Family History   Problem Relation Name Age of Onset    Colon cancer Mother      Stroke Father      Glaucoma Father       Social History     Tobacco Use    Smoking status: Never    Smokeless tobacco: Never   Substance Use Topics    Alcohol use: Not on file     Comment: occasional    Drug use: Never       Physical Exam   ED Triage Vitals [10/18/23 2037]   Temp Heart Rate Resp BP   36.4 °C (97.5 °F) 54 16 115/76      SpO2 Temp Source Heart Rate Source Patient Position   97 % Oral Monitor Sitting      BP Location FiO2 (%)     Right arm --       Physical Exam  Constitutional:       General: She is not in acute distress.     Appearance: Normal appearance. She is obese.   HENT:      Head: Normocephalic and atraumatic.      Nose: Nose normal.      Mouth/Throat:      Mouth: Mucous membranes are moist.   Eyes:      Extraocular Movements: Extraocular movements intact.      Conjunctiva/sclera: Conjunctivae normal.      Pupils: Pupils are equal, round, and reactive to light.   Cardiovascular:      Rate and Rhythm: Normal rate and regular rhythm.      Pulses: Normal pulses.      Heart sounds: Normal heart sounds. No murmur heard.     No friction rub. No gallop.   Pulmonary:      Effort: Pulmonary effort is normal.      Breath sounds: Normal breath sounds. No wheezing, rhonchi or rales.   Abdominal:      Palpations: Abdomen is soft.      Tenderness: There is no guarding or rebound.   Musculoskeletal:         General: Normal range of motion.      Cervical back: Normal range of motion and neck supple.      Right lower leg: Edema present.      Left lower leg: Edema present.   Skin:     Findings: Erythema (Right shin) present.      Comments: Bilateral lower extremity pitting edema which is chronic.  There is redness to the right shin however it is not warm.   Neurological:      General: No focal deficit present.      Mental  Status: She is alert and oriented to person, place, and time.   Psychiatric:         Mood and Affect: Mood normal.         Behavior: Behavior normal.         ED Course & MDM   ED Course as of 10/18/23 2242   Wed Oct 18, 2023   2225 CBC and Auto Differential(!)  No leukocytosis. [AJ]      ED Course User Index  [AJ] Shalini Light PA-C         Diagnoses as of 10/18/23 2242   Cellulitis of right lower extremity       Medical Decision Making  Parts of this chart have been completed using voice recognition software. Please excuse any errors of transcription. Despite the medical decision making time stamp above-my medical decision making has taken place during the patient's entire visit. My thought process and reason for plan has been formulated from the time that I saw the patient until the time of disposition and is not specific to one specific moment during their visit and furthermore my MDM encompasses this entire chart and not only this text box.    Patient seen in conjunction with attending physician .     HPI: Detailed above.    Exam: A medically appropriate exam performed, outlined above, given the known history and presentation.    History obtained from: Patient    Social Determinants of Health considered during this visit: Lives at home    Labs/Diagnostics:  Labs Reviewed   BASIC METABOLIC PANEL - Abnormal       Result Value    Glucose 117 (*)     Sodium 136      Potassium 5.0      Chloride 98      Bicarbonate 29      Urea Nitrogen 35 (*)     Creatinine 1.50      eGFR 32 (*)     Calcium 8.9      Anion Gap 9     CBC WITH AUTO DIFFERENTIAL - Abnormal    WBC 5.8      nRBC 0.0      RBC 5.07      Hemoglobin 14.4      Hematocrit 45.3      MCV 89      MCH 28.4      MCHC 31.8 (*)     RDW 18.1 (*)     Platelets 268      MPV 10.5      Neutrophils % 68.2      Immature Granulocytes %, Automated 0.2      Lymphocytes % 19.9      Monocytes % 8.9      Eosinophils % 2.1      Basophils % 0.7      Neutrophils Absolute  3.99      Immature Granulocytes Absolute, Automated 0.01      Lymphocytes Absolute 1.16      Monocytes Absolute 0.52      Eosinophils Absolute 0.12      Basophils Absolute 0.04       Vascular US lower extremity venous duplex bilateral   Final Result   No evidence for deep venous thrombosis within the limits of this   examination.        Signed by: Ricky Whitehead 10/18/2023 9:41 PM   Dictation workstation:   [id number]        Medications given during visit:keflex    Considerations/further MDM:  Patient is a 89-year-old female presenting to the emergency department for evaluation of erythema to the right shin.  On physical exam vital signs are stable and patient is in no acute distress.  She has bilateral lower extremity pitting edema which is chronic and she follows up with wound care for any oozing.  She recently developed erythema to the right shin that is new for her and wound care was concern for cellulitis therefore she was sent to the emergency department for further evaluation.  There is erythema to the right shin but no warmth.  Suspect cellulitis versus vascular disease.  Patient will be discharged with prescription for antibiotics.  She will follow-up with PCP in the next 1 to 2 days.  She will return to the emergency department any new or worsening symptoms.    Procedure  Procedures     Shalini Light PA-C  10/18/23 [id number]

## 2023-10-20 ENCOUNTER — PATIENT OUTREACH (OUTPATIENT)
Dept: PRIMARY CARE | Facility: CLINIC | Age: 88
End: 2023-10-20
Payer: MEDICARE

## 2023-10-20 NOTE — PROGRESS NOTES
Received phone call from patient. Patient is very upset because [facility] stated they cannot send a nurse to remove the patient's IV from her arm that was left from her emergency room visit at [facility]. The agency advised the patient return to the ER or go to an urgent care. The patient states this will be very difficult for her and she will be changing systems/health care providers.    Called [facility] to confirm above information and was told by the office they have no nurses available to go to the home. Additionally was told, it is the hospital's responsibility to resolve this issue. Reminded the office, the patient is 89 years old and it is difficult for her to get out of the home. She is homebound.     Called the nursing supervisor at [facility] and informed her of above. The nursing supervisor states she will call the patient and try to work through a solution. She may have the patient come to the hospital and a staff member will meet her at the car to remove the IV so she does not have to get out of the car and walk.

## 2023-10-25 ENCOUNTER — DOCUMENTATION (OUTPATIENT)
Dept: PRIMARY CARE | Facility: CLINIC | Age: 88
End: 2023-10-25
Payer: MEDICARE

## 2023-10-25 NOTE — PROGRESS NOTES
To whom it may concern,    My name is Linda Prather PA-C and I am the primary care provider for the following individual.    Name: Angela Ayala  Address: [address] [id number]  [employer] Medicare Member Number: [id number]   Requesting a Fast Appeal  Reasons For Appealing:     Patient is a 89 year old homebound female with multiple comorbid conditions including but not limited to hypertension, hyperlipidemia, CVA without residual deficits, obesity, osteoporosis, overactive bladder, glaucoma, and spinal stenosis whom is in need of home healthcare, wound care, and PT/OT.     She was recently hospitalized to [facility] 8/9/23 - 8/11/23 for new onset atrial fibrillation with RVR. With onset she developed shortness of breath with rest and exertion, generalized weakness, and significant fatigue. During this admission she was evaluated by PT/OT, whom recommended SNF placement (PT/OT evaluations attached to this letter for review). Patient and family preferred discharge to home with home healthcare and home PT/OT instead of SNF placement.     She was admitted to [facility] from 9/25/23 - 9/29/23 for chest pains. Echocardiogram was performed showing an LVEF of 25-30%. With the heart failure, the patient continues to experience shortness of breath, dizziness, and decreased endurance with weakness.     Since initial admission 8/9/23, patient has dealt with ongoing difficulties with bilateral lower extremity edema. 9/19/23 she was noted to have 2 non-healing wounds of her left lower extremity. She is followed by wound care and was sent to the ED 10/18/23 with concerns of new right lower extremity cellulitis. She was discharged with antibiotics.     Given her 3 recent hospital visits and multiple comorbid conditions, I am requesting an appeal for [employer] Medicare to cover additional home physical therapy sessions for Ms. Ayala. She was approved 2 visits and  denied 14 visits. I feel that Ms. Ayala would benefit greatly from these additional sessions to help improve her overall strength, endurance, and stability. At baseline, patient lives alone and ambulates with either a cane or walker for added stability.      Please call the office with any questions or concerns.     Thank you,  Linda Prather PA-C   Internal Medicine  [phone]

## 2023-11-01 ENCOUNTER — OFFICE VISIT (OUTPATIENT)
Dept: CARDIOLOGY | Facility: CLINIC | Age: 88
End: 2023-11-01
Payer: MEDICARE

## 2023-11-01 VITALS
DIASTOLIC BLOOD PRESSURE: 62 MMHG | SYSTOLIC BLOOD PRESSURE: 94 MMHG | WEIGHT: 195.1 LBS | HEIGHT: 63 IN | HEART RATE: 63 BPM | OXYGEN SATURATION: 96 % | BODY MASS INDEX: 34.57 KG/M2

## 2023-11-01 DIAGNOSIS — R60.9 EDEMA, UNSPECIFIED TYPE: Primary | ICD-10-CM

## 2023-11-01 PROCEDURE — 1036F TOBACCO NON-USER: CPT | Performed by: NURSE PRACTITIONER

## 2023-11-01 PROCEDURE — 1160F RVW MEDS BY RX/DR IN RCRD: CPT | Performed by: NURSE PRACTITIONER

## 2023-11-01 PROCEDURE — 3078F DIAST BP <80 MM HG: CPT | Performed by: NURSE PRACTITIONER

## 2023-11-01 PROCEDURE — 99214 OFFICE O/P EST MOD 30 MIN: CPT | Performed by: NURSE PRACTITIONER

## 2023-11-01 PROCEDURE — 3074F SYST BP LT 130 MM HG: CPT | Performed by: NURSE PRACTITIONER

## 2023-11-01 PROCEDURE — 1159F MED LIST DOCD IN RCRD: CPT | Performed by: NURSE PRACTITIONER

## 2023-11-01 PROCEDURE — 1126F AMNT PAIN NOTED NONE PRSNT: CPT | Performed by: NURSE PRACTITIONER

## 2023-11-01 RX ORDER — FUROSEMIDE 40 MG/1
40 TABLET ORAL 2 TIMES DAILY
Qty: 180 TABLET | Refills: 0 | Status: SHIPPED | OUTPATIENT
Start: 2023-11-01 | End: 2023-11-18 | Stop reason: HOSPADM

## 2023-11-01 ASSESSMENT — ENCOUNTER SYMPTOMS
RESPIRATORY NEGATIVE: 1
MUSCULOSKELETAL NEGATIVE: 1
GASTROINTESTINAL NEGATIVE: 1
NEUROLOGICAL NEGATIVE: 1
CONSTITUTIONAL NEGATIVE: 1

## 2023-11-01 ASSESSMENT — PATIENT HEALTH QUESTIONNAIRE - PHQ9
2. FEELING DOWN, DEPRESSED OR HOPELESS: SEVERAL DAYS
SUM OF ALL RESPONSES TO PHQ9 QUESTIONS 1 AND 2: 1
1. LITTLE INTEREST OR PLEASURE IN DOING THINGS: NOT AT ALL

## 2023-11-01 ASSESSMENT — PAIN SCALES - GENERAL: PAINLEVEL: 0-NO PAIN

## 2023-11-01 NOTE — PROGRESS NOTES
"Chief Complaint:   ED follow up for bilateral pedal edema    History Of Present Illness:    .Ms Ayala was seen in the ED for cellulitis and treated with antibiotics.  Denies chest pain, sob, palpitations, but has bilateral pedal edema.  Has home care to wrap the legs.           Last Recorded Vitals:  Blood pressure 94/62, pulse 63, height 1.6 m (5' 3\"), weight 88.5 kg (195 lb 1.6 oz), SpO2 96 %.     Past Medical History:  Past Medical History:   Diagnosis Date    Age-related nuclear cataract, right eye 10/09/2019    Age-related nuclear cataract, right eye    Age-related nuclear cataract, right eye 10/09/2019    Age-related nuclear cataract of right eye    Other conditions influencing health status     Mammogram    Other conditions influencing health status     DEXA Body Composition Study    Other conditions influencing health status     Colonoscopy (Fiberoptic)    Personal history of other diseases of the nervous system and sense organs 08/12/2019    History of hearing loss    Personal history of other diseases of the nervous system and sense organs 10/09/2019    History of cataract    Personal history of transient ischemic attack (TIA), and cerebral infarction without residual deficits 08/12/2019    History of cerebrovascular accident    Unspecified cataract     Cataract of left eye        Past Surgical History:  Past Surgical History:   Procedure Laterality Date    MR HEAD ANGIO WO IV CONTRAST  4/26/2012    MR HEAD ANGIO WO IV CONTRAST [facility]    OTHER SURGICAL HISTORY  08/27/2014    Laser Suite Retina Treatment Consisted Of    OTHER SURGICAL HISTORY  07/19/2013    Reported Hx Of Knee Replacement    OTHER SURGICAL HISTORY  07/19/2013    Remove Cataract, Corneo-Scleral Section Left Eye       Social History:  Social History     Socioeconomic History    Marital status:      Spouse name: Not on file    Number of children: Not on file    Years of education: Not on file    Highest education level: Not " on file   Occupational History    Not on file   Tobacco Use    Smoking status: Never    Smokeless tobacco: Never   Substance and Sexual Activity    Alcohol use: Not on file     Comment: occasional    Drug use: Never    Sexual activity: Not on file   Other Topics Concern    Not on file   Social History Narrative    Not on file     Social Determinants of Health     Financial Resource Strain: Not on file   Food Insecurity: Not on file   Transportation Needs: Not on file   Physical Activity: Not on file   Stress: Not on file   Social Connections: Not on file   Intimate Partner Violence: Not on file   Housing Stability: Not on file       Family History:  Family History   Problem Relation Name Age of Onset    Colon cancer Mother      Stroke Father      Glaucoma Father           Allergies:  Penicillins    Outpatient Medications:  Current Outpatient Medications   Medication Sig Dispense Refill    amiodarone (Pacerone) 200 mg tablet Take 1 tablet (200 mg) by mouth once daily.      apixaban (Eliquis) 5 mg tablet Take 1 tablet (5 mg) by mouth 2 times a day. 60 tablet 3    aspirin 81 mg EC tablet Take 1 tablet (81 mg) by mouth once daily.      atorvastatin (Lipitor) 40 mg tablet Take 1 tablet (40 mg) by mouth once daily.      bimatoprost (Lumigan) 0.01 % ophthalmic solution Administer 1 drop into both eyes once daily at bedtime.      calcium carbonate 600 mg calcium (1,500 mg) tablet Take 1 tablet (600 mg) by mouth once daily.      denosumab (Prolia) 60 mg/mL syringe Inject 1 mL (60 mg total) under the skin.      dorzolamide-timoloL (Cosopt) 22.3-6.8 mg/mL ophthalmic solution Administer 1 drop into affected eye(s) once daily.      dorzolamide-timolol, PF, 2-0.5 % drops Administer into affected eye(s).      ferrous sulfate 325 (65 Fe) MG tablet Take 1 tablet (325 mg) by mouth once daily.      furosemide (Lasix) 40 mg tablet Take 1 tablet (40 mg) by mouth once daily.      losartan (Cozaar) 25 mg tablet Take 1 tablet (25 mg) by  mouth once daily. 30 tablet 11    losartan (Cozaar) 25 mg tablet Take 1 tablet (25 mg) by mouth once daily. 90 tablet 3    metoprolol succinate XL (Toprol-XL) 100 mg 24 hr tablet Take 1 tablet (100 mg) by mouth once daily. (Patient taking differently: Take 0.5 tablets (50 mg) by mouth 2 times a day.) 90 tablet 1    omeprazole (PriLOSEC) 20 mg DR capsule Take 1 capsule (20 mg) by mouth once daily.       Current Facility-Administered Medications   Medication Dose Route Frequency Provider Last Rate Last Admin    losartan (Cozaar) tablet 25 mg  25 mg oral Once Kwame Johnson MD            Physical Exam:  Cardiovascular:      PMI at left midclavicular line. Normal rate. Regular rhythm. Normal S1. Normal S2.       Murmurs: There is no murmur.      No gallop.  No click. No rub.   Pulses:     Intact distal pulses.   Edema:     Thigh: bilateral 2+ pitting edema of the thigh.     Pretibial: bilateral 2+ pitting edema of the pretibial area.     Ankle: bilateral 2+ pitting edema of the ankle.     Feet: bilateral 2+ pitting edema of the feet.        ROS:  Review of Systems   Constitutional: Negative.   Cardiovascular:  Positive for leg swelling.   Respiratory: Negative.     Skin: Negative.    Musculoskeletal: Negative.    Gastrointestinal: Negative.    Genitourinary: Negative.    Neurological: Negative.           Last Labs:  CBC -  Lab Results   Component Value Date    WBC 5.8 10/18/2023    HGB 14.4 10/18/2023    HCT 45.3 10/18/2023    MCV 89 10/18/2023     10/18/2023       CMP -  Lab Results   Component Value Date    CALCIUM 8.9 10/18/2023    PROT 4.9 (L) 09/26/2023    ALBUMIN 2.6 (L) 09/26/2023    AST 32 09/26/2023    ALT 17 09/26/2023    ALKPHOS 87 09/26/2023    BILITOT 0.7 09/26/2023       LIPID PANEL -   Lab Results   Component Value Date    CHOL 97 (L) 09/26/2023    TRIG 70 09/26/2023    HDL 38 (L) 09/26/2023    CHHDL 2.6 09/26/2023    LDLF 81 04/18/2022    VLDL 14 04/18/2022       RENAL FUNCTION PANEL -   Lab  Results   Component Value Date    GLUCOSE 117 (H) 10/18/2023     10/18/2023    K 5.0 10/18/2023    CL 98 10/18/2023    CO2 29 10/18/2023    ANIONGAP 9 10/18/2023    ANIONGAP 18 10/03/2023    BUN 35 (H) 10/18/2023    CREATININE 1.50 10/18/2023    CALCIUM 8.9 10/18/2023    ALBUMIN 2.6 (L) 09/26/2023        Lab Results   Component Value Date    HGBA1C 6.0 07/06/2021         Assessment/Plan   Problem List Items Addressed This Visit    None    Impressions    Assessment:    #1 status post left hemispheric CVA 04/25/2012, [facility]. Please see the office note of 05/23/2012 for details. 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 As such she was placed initially on Plavix and aspirin. She is only on aspirin now. Additionally she will continue statin therapy. She will return in 4 weeks for routine follow-up.    #2 hypertension. Blood pressure is well controlled at today's office visit. She will continue losartan  25 mg daily along with the metoprolol ER 50 mg twice daily.      #3 hyperlipidemia. The patient did have lab work performed on 04/2022 with cholesterol 157 HDL 61 LDL 81 trig 81. We will continue atorvastatin 40 mg daily.    #4 complete left bundle-branch block. Upon review of previous EKG tracings, the left bundle branch block was not present in 2008 but was observed on EKG tracings in 2010. As such the left bundle branch block has been present for greater than 5 years and this presumably related to age and/or hypertension.    #5 ? CAD with diagnosis not confirmed by negative pharmacologic nuclear stress test 04/16/2010.    #6 status post left total knee replacement 04/20/2010.    #7 GERD.    #8, glaucoma    #9 low-grade carotid vascular disease     #10 status post right sided  cataract extraction 3/26/2015    #11 Afib with RVR. Patient was seen at [facility] for new onset afib with RVR. Now on metoprolol and amiodarone.  Is on Eliquis.     #12  VI with bilateral pedal edema and treatment for cellulitis.  Has home care for wraps.  Component of CHF, but also non compliance with diuretics due to incontinence of urine.      Martina Wolf, APRN-CNP

## 2023-11-09 ENCOUNTER — PATIENT OUTREACH (OUTPATIENT)
Dept: PRIMARY CARE | Facility: CLINIC | Age: 88
End: 2023-11-09
Payer: MEDICARE

## 2023-11-09 NOTE — PROGRESS NOTES
Unable to reach patient for monthly post discharge follow up. LVM with reminder of upcoming appt with Linda Head, 11/20/2023 13:00, and call back number for patient to call if needed.

## 2023-11-14 ENCOUNTER — HOSPITAL ENCOUNTER (INPATIENT)
Facility: HOSPITAL | Age: 88
LOS: 4 days | Discharge: HOSPICE | DRG: 291 | End: 2023-11-18
Attending: STUDENT IN AN ORGANIZED HEALTH CARE EDUCATION/TRAINING PROGRAM | Admitting: HOSPITALIST
Payer: MEDICARE

## 2023-11-14 ENCOUNTER — APPOINTMENT (OUTPATIENT)
Dept: RADIOLOGY | Facility: HOSPITAL | Age: 88
DRG: 291 | End: 2023-11-14
Payer: MEDICARE

## 2023-11-14 DIAGNOSIS — I73.9 PERIPHERAL VASCULAR DISEASE (CMS-HCC): ICD-10-CM

## 2023-11-14 DIAGNOSIS — M79.676 PAIN DUE TO ONYCHOMYCOSIS OF TOENAIL: ICD-10-CM

## 2023-11-14 DIAGNOSIS — N39.0 URINARY TRACT INFECTION WITHOUT HEMATURIA, SITE UNSPECIFIED: ICD-10-CM

## 2023-11-14 DIAGNOSIS — I50.33 ACUTE ON CHRONIC DIASTOLIC CONGESTIVE HEART FAILURE (MULTI): Primary | ICD-10-CM

## 2023-11-14 DIAGNOSIS — I95.9 HYPOTENSION, UNSPECIFIED HYPOTENSION TYPE: ICD-10-CM

## 2023-11-14 DIAGNOSIS — I48.91 ATRIAL FIBRILLATION WITH RVR (MULTI): ICD-10-CM

## 2023-11-14 DIAGNOSIS — B35.1 PAIN DUE TO ONYCHOMYCOSIS OF TOENAIL: ICD-10-CM

## 2023-11-14 DIAGNOSIS — R60.0 BILATERAL LOWER EXTREMITY EDEMA: ICD-10-CM

## 2023-11-14 DIAGNOSIS — R52 PAIN: ICD-10-CM

## 2023-11-14 LAB
ACANTHOCYTES BLD QL SMEAR: ABNORMAL
ALBUMIN SERPL-MCNC: 3.2 G/DL (ref 3.5–5)
ALP BLD-CCNC: 109 U/L (ref 35–125)
ALT SERPL-CCNC: 31 U/L (ref 5–40)
ANION GAP BLDA CALCULATED.4IONS-SCNC: 12 MMO/L (ref 10–25)
ANION GAP SERPL CALC-SCNC: 14 MMOL/L
ANION GAP SERPL CALC-SCNC: 15 MMOL/L
APPARATUS: ABNORMAL
AST SERPL-CCNC: 20 U/L (ref 5–40)
BASE EXCESS BLDA CALC-SCNC: -0.9 MMOL/L (ref -2–3)
BASOPHILS # BLD MANUAL: 0 X10*3/UL (ref 0–0.1)
BASOPHILS NFR BLD MANUAL: 0 %
BILIRUB SERPL-MCNC: 1.4 MG/DL (ref 0.1–1.2)
BODY TEMPERATURE: 37 DEGREES CELSIUS
BUN SERPL-MCNC: 53 MG/DL (ref 8–25)
BUN SERPL-MCNC: 54 MG/DL (ref 8–25)
BURR CELLS BLD QL SMEAR: ABNORMAL
CA-I BLDA-SCNC: 1.13 MMOL/L (ref 1.1–1.33)
CALCIUM SERPL-MCNC: 9.4 MG/DL (ref 8.5–10.4)
CALCIUM SERPL-MCNC: 9.4 MG/DL (ref 8.5–10.4)
CHLORIDE BLDA-SCNC: 101 MMOL/L (ref 98–107)
CHLORIDE SERPL-SCNC: 100 MMOL/L (ref 97–107)
CHLORIDE SERPL-SCNC: 96 MMOL/L (ref 97–107)
CK SERPL-CCNC: 29 U/L (ref 24–195)
CK SERPL-CCNC: 37 U/L (ref 24–195)
CK SERPL-CCNC: 39 U/L (ref 24–195)
CO2 SERPL-SCNC: 24 MMOL/L (ref 24–31)
CO2 SERPL-SCNC: 25 MMOL/L (ref 24–31)
CREAT SERPL-MCNC: 1.7 MG/DL (ref 0.4–1.6)
CREAT SERPL-MCNC: 1.7 MG/DL (ref 0.4–1.6)
EOSINOPHIL # BLD MANUAL: 0 X10*3/UL (ref 0–0.4)
EOSINOPHIL NFR BLD MANUAL: 0 %
ERYTHROCYTE [DISTWIDTH] IN BLOOD BY AUTOMATED COUNT: 21 % (ref 11.5–14.5)
FLOW: 15 LPM
GFR SERPL CREATININE-BSD FRML MDRD: 28 ML/MIN/1.73M*2
GFR SERPL CREATININE-BSD FRML MDRD: 28 ML/MIN/1.73M*2
GLUCOSE BLDA-MCNC: 187 MG/DL (ref 74–99)
GLUCOSE SERPL-MCNC: 144 MG/DL (ref 65–99)
GLUCOSE SERPL-MCNC: 190 MG/DL (ref 65–99)
HCO3 BLDA-SCNC: 22.4 MMOL/L (ref 22–26)
HCT VFR BLD AUTO: 51 % (ref 36–46)
HCT VFR BLD EST: 50 % (ref 36–46)
HGB BLD-MCNC: 16.1 G/DL (ref 12–16)
HGB BLDA-MCNC: 16.6 G/DL (ref 12–16)
IMM GRANULOCYTES # BLD AUTO: 0 X10*3/UL (ref 0–0.5)
IMM GRANULOCYTES NFR BLD AUTO: 0 % (ref 0–0.9)
INHALED O2 CONCENTRATION: 85 %
LACTATE BLDA-SCNC: 3.6 MMOL/L (ref 0.4–2)
LACTATE BLDV-SCNC: 5.1 MMOL/L (ref 0.4–2)
LYMPHOCYTES # BLD MANUAL: 0.34 X10*3/UL (ref 0.8–3)
LYMPHOCYTES NFR BLD MANUAL: 6 %
MCH RBC QN AUTO: 29.5 PG (ref 26–34)
MCHC RBC AUTO-ENTMCNC: 31.6 G/DL (ref 32–36)
MCV RBC AUTO: 93 FL (ref 80–100)
MONOCYTES # BLD MANUAL: 0.22 X10*3/UL (ref 0.05–0.8)
MONOCYTES NFR BLD MANUAL: 4 %
NEUTROPHILS # BLD MANUAL: 5.04 X10*3/UL (ref 1.6–5.5)
NEUTS BAND # BLD MANUAL: 0.17 X10*3/UL (ref 0–0.5)
NEUTS BAND NFR BLD MANUAL: 3 %
NEUTS SEG # BLD MANUAL: 4.87 X10*3/UL (ref 1.6–5)
NEUTS SEG NFR BLD MANUAL: 87 %
NRBC BLD-RTO: 0 /100 WBCS (ref 0–0)
NT-PROBNP SERPL-MCNC: ABNORMAL PG/ML (ref 0–624)
OVALOCYTES BLD QL SMEAR: ABNORMAL
OXYHGB MFR BLDA: 97.3 % (ref 94–98)
PCO2 BLDA: 33 MM HG (ref 38–42)
PH BLDA: 7.44 PH (ref 7.38–7.42)
PLATELET # BLD AUTO: 275 X10*3/UL (ref 150–450)
PO2 BLDA: 422 MM HG (ref 85–95)
POTASSIUM BLDA-SCNC: 5.6 MMOL/L (ref 3.5–5.3)
POTASSIUM SERPL-SCNC: 5 MMOL/L (ref 3.4–5.1)
POTASSIUM SERPL-SCNC: 6.1 MMOL/L (ref 3.4–5.1)
PROT SERPL-MCNC: 5.8 G/DL (ref 5.9–7.9)
RBC # BLD AUTO: 5.46 X10*6/UL (ref 4–5.2)
RBC MORPH BLD: ABNORMAL
SAO2 % BLDA: 100 % (ref 94–100)
SCHISTOCYTES BLD QL SMEAR: ABNORMAL
SODIUM BLDA-SCNC: 130 MMOL/L (ref 136–145)
SODIUM SERPL-SCNC: 136 MMOL/L (ref 133–145)
SODIUM SERPL-SCNC: 138 MMOL/L (ref 133–145)
TOTAL CELLS COUNTED BLD: 100
TROPONIN T SERPL-MCNC: 29 NG/L
TROPONIN T SERPL-MCNC: 30 NG/L
TROPONIN T SERPL-MCNC: 30 NG/L
TROPONIN T SERPL-MCNC: 34 NG/L
TROPONIN T SERPL-MCNC: 34 NG/L
WBC # BLD AUTO: 5.6 X10*3/UL (ref 4.4–11.3)

## 2023-11-14 PROCEDURE — 36415 COLL VENOUS BLD VENIPUNCTURE: CPT | Performed by: STUDENT IN AN ORGANIZED HEALTH CARE EDUCATION/TRAINING PROGRAM

## 2023-11-14 PROCEDURE — 84484 ASSAY OF TROPONIN QUANT: CPT | Performed by: STUDENT IN AN ORGANIZED HEALTH CARE EDUCATION/TRAINING PROGRAM

## 2023-11-14 PROCEDURE — 2500000005 HC RX 250 GENERAL PHARMACY W/O HCPCS: Performed by: STUDENT IN AN ORGANIZED HEALTH CARE EDUCATION/TRAINING PROGRAM

## 2023-11-14 PROCEDURE — 85007 BL SMEAR W/DIFF WBC COUNT: CPT | Performed by: STUDENT IN AN ORGANIZED HEALTH CARE EDUCATION/TRAINING PROGRAM

## 2023-11-14 PROCEDURE — 2500000002 HC RX 250 W HCPCS SELF ADMINISTERED DRUGS (ALT 637 FOR MEDICARE OP, ALT 636 FOR OP/ED): Performed by: NURSE PRACTITIONER

## 2023-11-14 PROCEDURE — 82553 CREATINE MB FRACTION: CPT | Mod: WESLAB | Performed by: NURSE PRACTITIONER

## 2023-11-14 PROCEDURE — 82550 ASSAY OF CK (CPK): CPT | Performed by: NURSE PRACTITIONER

## 2023-11-14 PROCEDURE — 85027 COMPLETE CBC AUTOMATED: CPT | Performed by: STUDENT IN AN ORGANIZED HEALTH CARE EDUCATION/TRAINING PROGRAM

## 2023-11-14 PROCEDURE — G0390 TRAUMA RESPONS W/HOSP CRITI: HCPCS

## 2023-11-14 PROCEDURE — 71045 X-RAY EXAM CHEST 1 VIEW: CPT | Mod: FY

## 2023-11-14 PROCEDURE — 70450 CT HEAD/BRAIN W/O DYE: CPT

## 2023-11-14 PROCEDURE — 93970 EXTREMITY STUDY: CPT

## 2023-11-14 PROCEDURE — 84295 ASSAY OF SERUM SODIUM: CPT | Performed by: STUDENT IN AN ORGANIZED HEALTH CARE EDUCATION/TRAINING PROGRAM

## 2023-11-14 PROCEDURE — 36600 WITHDRAWAL OF ARTERIAL BLOOD: CPT

## 2023-11-14 PROCEDURE — 73564 X-RAY EXAM KNEE 4 OR MORE: CPT | Mod: LT

## 2023-11-14 PROCEDURE — 83605 ASSAY OF LACTIC ACID: CPT | Performed by: STUDENT IN AN ORGANIZED HEALTH CARE EDUCATION/TRAINING PROGRAM

## 2023-11-14 PROCEDURE — 2500000001 HC RX 250 WO HCPCS SELF ADMINISTERED DRUGS (ALT 637 FOR MEDICARE OP): Performed by: NURSE PRACTITIONER

## 2023-11-14 PROCEDURE — 2060000001 HC INTERMEDIATE ICU ROOM DAILY

## 2023-11-14 PROCEDURE — 72125 CT NECK SPINE W/O DYE: CPT

## 2023-11-14 PROCEDURE — 82435 ASSAY OF BLOOD CHLORIDE: CPT | Performed by: NURSE PRACTITIONER

## 2023-11-14 PROCEDURE — 84295 ASSAY OF SERUM SODIUM: CPT | Performed by: NURSE PRACTITIONER

## 2023-11-14 PROCEDURE — 84484 ASSAY OF TROPONIN QUANT: CPT | Performed by: NURSE PRACTITIONER

## 2023-11-14 PROCEDURE — 2500000002 HC RX 250 W HCPCS SELF ADMINISTERED DRUGS (ALT 637 FOR MEDICARE OP, ALT 636 FOR OP/ED): Performed by: STUDENT IN AN ORGANIZED HEALTH CARE EDUCATION/TRAINING PROGRAM

## 2023-11-14 PROCEDURE — 2500000004 HC RX 250 GENERAL PHARMACY W/ HCPCS (ALT 636 FOR OP/ED): Performed by: STUDENT IN AN ORGANIZED HEALTH CARE EDUCATION/TRAINING PROGRAM

## 2023-11-14 PROCEDURE — 82550 ASSAY OF CK (CPK): CPT | Performed by: STUDENT IN AN ORGANIZED HEALTH CARE EDUCATION/TRAINING PROGRAM

## 2023-11-14 PROCEDURE — 96374 THER/PROPH/DIAG INJ IV PUSH: CPT

## 2023-11-14 PROCEDURE — 83880 ASSAY OF NATRIURETIC PEPTIDE: CPT | Performed by: STUDENT IN AN ORGANIZED HEALTH CARE EDUCATION/TRAINING PROGRAM

## 2023-11-14 PROCEDURE — 93010 ELECTROCARDIOGRAM REPORT: CPT | Performed by: INTERNAL MEDICINE

## 2023-11-14 PROCEDURE — 2500000001 HC RX 250 WO HCPCS SELF ADMINISTERED DRUGS (ALT 637 FOR MEDICARE OP): Performed by: STUDENT IN AN ORGANIZED HEALTH CARE EDUCATION/TRAINING PROGRAM

## 2023-11-14 PROCEDURE — 99223 1ST HOSP IP/OBS HIGH 75: CPT | Performed by: SURGERY

## 2023-11-14 PROCEDURE — 85018 HEMOGLOBIN: CPT | Performed by: STUDENT IN AN ORGANIZED HEALTH CARE EDUCATION/TRAINING PROGRAM

## 2023-11-14 PROCEDURE — 99285 EMERGENCY DEPT VISIT HI MDM: CPT | Mod: 25 | Performed by: STUDENT IN AN ORGANIZED HEALTH CARE EDUCATION/TRAINING PROGRAM

## 2023-11-14 RX ORDER — DEXTROSE MONOHYDRATE 100 MG/ML
50 INJECTION, SOLUTION INTRAVENOUS CONTINUOUS
Status: DISCONTINUED | OUTPATIENT
Start: 2023-11-14 | End: 2023-11-14

## 2023-11-14 RX ORDER — DORZOLAMIDE HYDROCHLORIDE AND TIMOLOL MALEATE 20; 5 MG/ML; MG/ML
1 SOLUTION/ DROPS OPHTHALMIC DAILY
Status: DISCONTINUED | OUTPATIENT
Start: 2023-11-14 | End: 2023-11-14

## 2023-11-14 RX ORDER — FUROSEMIDE 10 MG/ML
40 INJECTION INTRAMUSCULAR; INTRAVENOUS ONCE
Status: COMPLETED | OUTPATIENT
Start: 2023-11-14 | End: 2023-11-14

## 2023-11-14 RX ORDER — CALCIUM GLUCONATE 20 MG/ML
2 INJECTION, SOLUTION INTRAVENOUS ONCE
Status: COMPLETED | OUTPATIENT
Start: 2023-11-14 | End: 2023-11-14

## 2023-11-14 RX ORDER — DEXTROSE 50 % IN WATER (D50W) INTRAVENOUS SYRINGE
25 ONCE
Status: COMPLETED | OUTPATIENT
Start: 2023-11-14 | End: 2023-11-14

## 2023-11-14 RX ORDER — ACETAMINOPHEN 325 MG/1
650 TABLET ORAL EVERY 6 HOURS PRN
Status: DISCONTINUED | OUTPATIENT
Start: 2023-11-14 | End: 2023-11-18 | Stop reason: HOSPADM

## 2023-11-14 RX ORDER — FERROUS SULFATE 325(65) MG
1 TABLET ORAL
Status: DISCONTINUED | OUTPATIENT
Start: 2023-11-15 | End: 2023-11-18 | Stop reason: HOSPADM

## 2023-11-14 RX ORDER — ACETAMINOPHEN 160 MG/5ML
650 SOLUTION ORAL EVERY 6 HOURS PRN
Status: DISCONTINUED | OUTPATIENT
Start: 2023-11-14 | End: 2023-11-18 | Stop reason: HOSPADM

## 2023-11-14 RX ORDER — METOPROLOL TARTRATE 50 MG/1
50 TABLET ORAL 2 TIMES DAILY
Status: DISCONTINUED | OUTPATIENT
Start: 2023-11-14 | End: 2023-11-16

## 2023-11-14 RX ORDER — ATORVASTATIN CALCIUM 40 MG/1
40 TABLET, FILM COATED ORAL NIGHTLY
Status: DISCONTINUED | OUTPATIENT
Start: 2023-11-14 | End: 2023-11-18 | Stop reason: HOSPADM

## 2023-11-14 RX ORDER — BISACODYL 5 MG
10 TABLET, DELAYED RELEASE (ENTERIC COATED) ORAL DAILY PRN
Status: DISCONTINUED | OUTPATIENT
Start: 2023-11-14 | End: 2023-11-18 | Stop reason: HOSPADM

## 2023-11-14 RX ORDER — LATANOPROST 50 UG/ML
1 SOLUTION/ DROPS OPHTHALMIC NIGHTLY
Status: DISCONTINUED | OUTPATIENT
Start: 2023-11-14 | End: 2023-11-18 | Stop reason: HOSPADM

## 2023-11-14 RX ORDER — METOPROLOL SUCCINATE 100 MG/1
100 TABLET, EXTENDED RELEASE ORAL DAILY
Status: DISCONTINUED | OUTPATIENT
Start: 2023-11-14 | End: 2023-11-14

## 2023-11-14 RX ORDER — DORZOLAMIDE HYDROCHLORIDE AND TIMOLOL MALEATE 20; 5 MG/ML; MG/ML
1 SOLUTION/ DROPS OPHTHALMIC NIGHTLY
Status: DISCONTINUED | OUTPATIENT
Start: 2023-11-14 | End: 2023-11-18 | Stop reason: HOSPADM

## 2023-11-14 RX ORDER — AMIODARONE HYDROCHLORIDE 200 MG/1
200 TABLET ORAL DAILY
Status: DISCONTINUED | OUTPATIENT
Start: 2023-11-14 | End: 2023-11-18 | Stop reason: HOSPADM

## 2023-11-14 RX ORDER — PANTOPRAZOLE SODIUM 40 MG/1
40 TABLET, DELAYED RELEASE ORAL DAILY
Status: DISCONTINUED | OUTPATIENT
Start: 2023-11-15 | End: 2023-11-18

## 2023-11-14 RX ORDER — ACETAMINOPHEN 650 MG/1
650 SUPPOSITORY RECTAL EVERY 6 HOURS PRN
Status: DISCONTINUED | OUTPATIENT
Start: 2023-11-14 | End: 2023-11-18 | Stop reason: HOSPADM

## 2023-11-14 RX ORDER — ASPIRIN 81 MG/1
81 TABLET ORAL DAILY
Status: DISCONTINUED | OUTPATIENT
Start: 2023-11-14 | End: 2023-11-18 | Stop reason: HOSPADM

## 2023-11-14 RX ADMIN — ASPIRIN 81 MG: 81 TABLET, COATED ORAL at 18:01

## 2023-11-14 RX ADMIN — AMIODARONE HYDROCHLORIDE 200 MG: 200 TABLET ORAL at 15:50

## 2023-11-14 RX ADMIN — DORZOLAMIDE HYDROCHLORIDE AND TIMOLOL MALEATE 1 DROP: 20; 5 SOLUTION/ DROPS OPHTHALMIC at 22:21

## 2023-11-14 RX ADMIN — LATANOPROST 1 DROP: 50 SOLUTION OPHTHALMIC at 22:36

## 2023-11-14 RX ADMIN — METOPROLOL TARTRATE 50 MG: 50 TABLET ORAL at 15:50

## 2023-11-14 RX ADMIN — APIXABAN 5 MG: 5 TABLET, FILM COATED ORAL at 22:21

## 2023-11-14 RX ADMIN — FUROSEMIDE 40 MG: 10 INJECTION, SOLUTION INTRAMUSCULAR; INTRAVENOUS at 14:08

## 2023-11-14 RX ADMIN — DEXTROSE MONOHYDRATE 25 G: 25 INJECTION, SOLUTION INTRAVENOUS at 14:39

## 2023-11-14 RX ADMIN — ATORVASTATIN CALCIUM 40 MG: 40 TABLET, FILM COATED ORAL at 22:21

## 2023-11-14 RX ADMIN — INSULIN HUMAN 10 UNITS: 100 INJECTION, SOLUTION PARENTERAL at 14:35

## 2023-11-14 RX ADMIN — CALCIUM GLUCONATE 2 G: 20 INJECTION, SOLUTION INTRAVENOUS at 14:36

## 2023-11-14 RX ADMIN — SODIUM ZIRCONIUM CYCLOSILICATE 10 G: 10 POWDER, FOR SUSPENSION ORAL at 14:36

## 2023-11-14 SDOH — SOCIAL STABILITY: SOCIAL INSECURITY: WERE YOU ABLE TO COMPLETE ALL THE BEHAVIORAL HEALTH SCREENINGS?: NO

## 2023-11-14 SDOH — SOCIAL STABILITY: SOCIAL INSECURITY: ABUSE: ADULT

## 2023-11-14 ASSESSMENT — PAIN - FUNCTIONAL ASSESSMENT
PAIN_FUNCTIONAL_ASSESSMENT: 0-10

## 2023-11-14 ASSESSMENT — COGNITIVE AND FUNCTIONAL STATUS - GENERAL
TOILETING: A LITTLE
STANDING UP FROM CHAIR USING ARMS: A LITTLE
MOVING FROM LYING ON BACK TO SITTING ON SIDE OF FLAT BED WITH BEDRAILS: A LOT
DRESSING REGULAR LOWER BODY CLOTHING: A LITTLE
DRESSING REGULAR UPPER BODY CLOTHING: A LITTLE
DAILY ACTIVITIY SCORE: 18
PERSONAL GROOMING: A LITTLE
MOBILITY SCORE: 15
WALKING IN HOSPITAL ROOM: A LITTLE
CLIMB 3 TO 5 STEPS WITH RAILING: A LITTLE
TURNING FROM BACK TO SIDE WHILE IN FLAT BAD: A LOT
HELP NEEDED FOR BATHING: A LITTLE
EATING MEALS: A LITTLE
PATIENT BASELINE BEDBOUND: NO
MOVING TO AND FROM BED TO CHAIR: A LOT

## 2023-11-14 ASSESSMENT — ENCOUNTER SYMPTOMS
EYE REDNESS: 0
WEAKNESS: 0
TROUBLE SWALLOWING: 0
CONFUSION: 0
COUGH: 0
SHORTNESS OF BREATH: 0
VOMITING: 0
FACIAL SWELLING: 0
CHILLS: 0
DYSURIA: 0
WOUND: 0
DIZZINESS: 0
APPETITE CHANGE: 0
BLOOD IN STOOL: 0
SHORTNESS OF BREATH: 1
NUMBNESS: 0
COLOR CHANGE: 0
ABDOMINAL PAIN: 0
CONSTIPATION: 0
PALPITATIONS: 0
FATIGUE: 0
NERVOUS/ANXIOUS: 0
FEVER: 0
SORE THROAT: 0
DIFFICULTY URINATING: 0
SPEECH DIFFICULTY: 0
ABDOMINAL DISTENTION: 0
ADENOPATHY: 0
BRUISES/BLEEDS EASILY: 0
BACK PAIN: 0
CHEST TIGHTNESS: 0
DIARRHEA: 0
LIGHT-HEADEDNESS: 0
NAUSEA: 0
NECK PAIN: 0
RHINORRHEA: 0

## 2023-11-14 ASSESSMENT — ACTIVITIES OF DAILY LIVING (ADL)
BATHING: NEEDS ASSISTANCE
HEARING - RIGHT EAR: HEARING AID
FEEDING YOURSELF: INDEPENDENT
HEARING - LEFT EAR: HEARING AID
DRESSING YOURSELF: NEEDS ASSISTANCE
PATIENT'S MEMORY ADEQUATE TO SAFELY COMPLETE DAILY ACTIVITIES?: YES
ASSISTIVE_DEVICE: HEARING AID - LEFT
WALKS IN HOME: INDEPENDENT
JUDGMENT_ADEQUATE_SAFELY_COMPLETE_DAILY_ACTIVITIES: YES
GROOMING: NEEDS ASSISTANCE
ADEQUATE_TO_COMPLETE_ADL: YES
TOILETING: NEEDS ASSISTANCE

## 2023-11-14 ASSESSMENT — PAIN SCALES - GENERAL
PAINLEVEL_OUTOF10: 0 - NO PAIN

## 2023-11-14 NOTE — NURSING NOTE
Pt admitted to unit from ED at 1615. Telemetry attached showing Afib rhythm.  Call light within reach.    Waffle mattress applied to bed.

## 2023-11-14 NOTE — NURSING NOTE
Dr Zarco has been in doppler to BLE toes on rt foot purple and cool.   Legs washed with soap and water   xeroform applied abd pads applied wrapped with kerlex  pt tolerated well bath complete pt tolerated well  photos taken of skin breakdown border applied to sacrum  barrier cream applied liberally to excoriation of abd folds after being washed with soap and water,  Family at bedside dinner tray arrived call light in reach

## 2023-11-14 NOTE — H&P
Chief Complaint: Left knee pain    History Of Present Illness  Angela Ayala is a 89 y.o. female  with a past medical history of chronic systolic heart failure, atrial fibrillation, on eliquis, hyperlipidemia, CAD, and GERD who presented to the emergency department with complaints of left knee pain. Patient is a limited historian. History obtained from the patient, family, and chart. Patient had apparently fell on Sunday and laid on the floor for twelve hours before her family found her. She does live home alone. Son states that she did have some shortness of breath on Sunday however that she was doing better yesterday. Today she began to complain of knee pain and they decided to take her to the ER. Son states that she has been having swelling in her lower extremities. She was seen by her cardiologist earlier this month. She sees Dr. Johnson. He increased her Lasix to 40mg BID however son states that she has been refusing to take the second dose due to increased urination. She does report shortness of breath with exertion, denies chest pain, fevers, chills, or cough. Denies nausea or vomiting. No abdominal discomfort. Patient was found to be in A fib RVR in the ED with a rate in the 130s to 140s. Noted BLE edema, LLE with erythema, tenderness, and warmth. Patient was seen by trauma surgery in the ED. Recommended CLARISSA and vascular surgery consulted based on assessment. CT head/neck showed no acute process. Trauma signed off.     In the ED: Patient in A fib RVR, hypotensive with a systolic in the 90s. Blood pressure did [place] [address], CXR showed small to moderate bilateral effusions, bilateral airspace opacities, likely atelectasis, congestion, probable mild pulmonary edema. Patient was hyperkalemic with a potassium of 6.1. No leukocytosis. H/H stable at 16.1, 51, . Sodium 138, BUN/creatinine 53/1.7, previously 35/1.5. Glucose was elevated at 190. Lactic on ABG was 5.1. Patient was given IV  Sarah. Spoke with Dr. Johnson, recommended metoprolol tartrate 50mg BID, first dose now. Patient was given K cocktail, cardiology recommending giving digoxin 0.25mg x one dose if potassium improves to five or below if rate remains high, if unable to give dig and rate remains high can give lopressor 5mg IV Q 2 hours for heart rate sustained >120 if SBP >90. XR knee showed no fracture. Patient was admitted to [facility] for further evaluation and treatment.        Past Medical History  Past Medical History:   Diagnosis Date    Age-related nuclear cataract, right eye 10/09/2019    Age-related nuclear cataract, right eye    Age-related nuclear cataract, right eye 10/09/2019    Age-related nuclear cataract of right eye    Atrial fibrillation (CMS/HCC)     CHF (congestive heart failure) (CMS/HCC)     Chronic kidney disease     Dyslipidemia     GERD (gastroesophageal reflux disease)     Glaucoma     Heart disease     Hypertension     Myocardial infarction (CMS/HCC)     Osteoporosis     Other conditions influencing health status     Mammogram    Other conditions influencing health status     DEXA Body Composition Study    Other conditions influencing health status     Colonoscopy (Fiberoptic)    Personal history of other diseases of the nervous system and sense organs 08/12/2019    History of hearing loss    Personal history of other diseases of the nervous system and sense organs 10/09/2019    History of cataract    Personal history of transient ischemic attack (TIA), and cerebral infarction without residual deficits 08/12/2019    History of cerebrovascular accident    Stroke (CMS/HCC)     Unspecified cataract     Cataract of left eye       Surgical History  Past Surgical History:   Procedure Laterality Date    KNEE SURGERY Bilateral     MR HEAD ANGIO WO IV CONTRAST  04/26/2012    MR HEAD ANGIO WO IV CONTRAST [facility]    OTHER SURGICAL HISTORY  08/27/2014    Laser Suite Retina Treatment Consisted Of    OTHER  SURGICAL HISTORY  07/19/2013    Reported Hx Of Knee Replacement    OTHER SURGICAL HISTORY  07/19/2013    Remove Cataract, Corneo-Scleral Section Left Eye        Social History  She reports that she has never smoked. She has never used smokeless tobacco. She reports that she does not use drugs. No history on file for alcohol use.    Family History  Family History   Problem Relation Name Age of Onset    Colon cancer Mother      Stroke Father      Glaucoma Father          Allergies  Penicillins    Review of Systems   Constitutional:  Negative for appetite change, fatigue and fever.   HENT:  Negative for congestion and rhinorrhea.    Respiratory:  Positive for shortness of breath. Negative for cough and chest tightness.    Cardiovascular:  Negative for chest pain and palpitations.   Gastrointestinal:  Negative for abdominal distention, abdominal pain, constipation, diarrhea, nausea and vomiting.   Genitourinary:  Negative for dysuria and urgency.   Musculoskeletal:         Left knee pain   Neurological:  Negative for dizziness, light-headedness and numbness.   All other systems reviewed and are negative.       Physical Exam  Vitals reviewed.   Constitutional:       Appearance: Normal appearance.   HENT:      Head: Normocephalic and atraumatic.      Nose: Nose normal.      Mouth/Throat:      Mouth: Mucous membranes are moist.   Eyes:      Extraocular Movements: Extraocular movements intact.      Conjunctiva/sclera: Conjunctivae normal.   Cardiovascular:      Rate and Rhythm: Tachycardia present. Rhythm irregular.   Pulmonary:      Effort: Pulmonary effort is normal.      Breath sounds: Normal breath sounds. No wheezing, rhonchi or rales.   Abdominal:      General: Bowel sounds are normal.      Palpations: Abdomen is soft.      Tenderness: There is no abdominal tenderness.   Musculoskeletal:         General: Swelling and tenderness present. Normal range of motion.      Cervical back: Normal range of motion and neck  "supple.      Right lower leg: Edema present.      Left lower leg: Edema present.   Skin:     General: Skin is warm and dry.      Capillary Refill: Capillary refill takes less than 2 seconds.      Findings: Erythema present.      Comments: LLE edema with warmth, tenderness, and erythema   Neurological:      General: No focal deficit present.      Mental Status: She is alert and oriented to person, place, and time.   Psychiatric:         Mood and Affect: Mood normal.         Behavior: Behavior normal.          Last Recorded Vitals  Blood pressure 107/61, pulse (!) 118, temperature 36.9 °C (98.4 °F), resp. rate 18, height 1.6 m (5' 3\"), weight 89 kg (196 lb 3.4 oz), SpO2 99 %.    Relevant Results  Lab Results   Component Value Date    GLUCOSE 190 (H) 11/14/2023    CALCIUM 9.4 11/14/2023     11/14/2023    K 6.1 (HH) 11/14/2023    CO2 24 11/14/2023     11/14/2023    BUN 53 (H) 11/14/2023    CREATININE 1.70 (H) 11/14/2023     Lab Results   Component Value Date    WBC 5.6 11/14/2023    HGB 16.1 (H) 11/14/2023    HCT 51.0 (H) 11/14/2023    MCV 93 11/14/2023     11/14/2023     XR knee left 4+ views    Result Date: 11/14/2023  Interpreted By:  Josue Anand, STUDY: XR KNEE LEFT 4+ VIEWS; 11/14/2023 1:29 pm   INDICATION: Signs/Symptoms:fall.   COMPARISON: None available.   ACCESSION NUMBER(S): [id number]   ORDERING CLINICIAN: MICHAEL COOPER   TECHNIQUE: Views: AP lateral, and oblique views of the left knee.   FINDINGS: There is no evidence for fracture or subluxation. The left total knee prosthesis components are intact. No abnormal lucencies. No evidence for a joint effusion. No abnormal radiopaque foreign body.       Normal appearance of left total knee prosthesis. No evidence for an acute fracture or acute osseous abnormality.   Signed by: Josue Anand 11/14/2023 2:08 PM Dictation workstation:   [id number]    XR chest 1 view    Result Date: 11/14/2023  Interpreted By:  Josue Anand, STUDY: XR " CHEST 1 VIEW; 11/14/2023 1:29 pm   INDICATION: Signs/Symptoms:sob.   COMPARISON: 09/27/2023   ACCESSION NUMBER(S): [id number]   ORDERING CLINICIAN: MICHAEL COOPER   TECHNIQUE: 1 view of the chest was performed.   FINDINGS: The cardiomediastinal silhouette is mildly enlarged, unchanged. Small-moderate bilateral pleural effusions, similar to the prior study. Mild prominence of the interstitium suggest congestive changes with probable mild pulmonary edema. Bibasilar airspace opacities presumed atelectasis adjacent to the pleural effusions although pneumonia should be clinically excluded.       Small-moderate bilateral pleural effusions. Bilateral airspace opacity in the lower lobes most likely atelectasis although pneumonia should be clinically excluded. Congestive changes with probable mild pulmonary edema.   Signed by: Josue Anand 11/14/2023 2:02 PM Dictation workstation:   [id number]    CT head wo IV contrast    Result Date: 11/14/2023  Interpreted By:  Josue Anand, STUDY: MICHAEL COOPER; 11/14/2023 1:06 pm   INDICATION: fall;   COMPARISON: None   ACCESSION NUMBER(S): [id number]   ORDERING CLINICIAN: MICHAEL COOPER   TECHNIQUE: Contiguous axial images were acquired from the vertex through the posterior fossa without IV contrast. All CT examinations are performed with 1 or more of the following dose reduction techniques: Automated exposure control, adjustment of mA and/or kv according to patient's size, or use of iterative reconstruction techniques.   FINDINGS: No focal mass effect or midline shift is identified. The ventricles and sulci are symmetric and appropriate for the patient's age.   Moderate degree of nonspecific white matter change most consistent with chronic small-vessel ischemic disease. 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 The gray white matter differentiation is preserved.    No acute intracranial hemorrhage is seen. No intra-axial or extra-axial fluid collection is seen.   The visualized paranasal sinuses and mastoid air cells are clear.       No CT evidence for acute intracranial pathology.   Moderate degree of nonspecific white matter change most consistent with chronic small-vessel ischemic disease.   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   Signed by: Josue Anand 11/14/2023 1:55 PM Dictation workstation:   [id number]    CT cervical spine wo IV contrast    Result Date: 11/14/2023  Interpreted By:  Josue Anand, STUDY: CT CERVICAL SPINE WO IV CONTRAST; 11/14/2023 1:06 pm   INDICATION: Signs/Symptoms:fall;   COMPARISON: None   ACCESSION NUMBER(S): [id number]   ORDERING CLINICIAN: MICHAEL COOPER   TECHNIQUE: Contiguous axial images were acquired from the skull base to the lung apices. Coronal and sagittal reformatted images were obtained. All CT examinations are performed with 1 or more of the following dose reduction techniques: Automated exposure control, adjustment of mA and/or kv according to patient's size, or use of iterative reconstruction techniques.   FINDINGS: There is a normal cervical lordosis. No acute fracture or traumatic malalignment is identified. Minimal degenerative appearing anterolisthesis of C6 on C7, proximally 2 mm. There is also trace degenerative anterolisthesis of C7 on T1. Facet degenerative changes and uncovertebral joint degenerative changes are present.     The occipital condyles, arch of C1, and the odontoid processes are intact. The atlantoaxial relationship is maintained with degenerative changes and calcified pannus formation.   There is moderate disc space narrowing at C3-4. Severe disc space narrowing at C4-5, C5-6, and C6-7. No evidence for high-grade bony spinal canal stenosis. However there is multilevel high-grade bilateral neural foramina stenosis.   The visualized lung apices are unremarkable.       1. No acute fracture or traumatic  malalignment. 2. Degenerative disc disease and spondylosis as described in the body of the report.     Signed by: Josue Anand 11/14/2023 1:53 PM Dictation workstation:   [id number]    Vascular US lower extremity venous duplex bilateral    Result Date: 10/18/2023  Interpreted By:  Ricky Whitehead, STUDY: [facility] US LOWER EXTREMITY VENOUS DUPLEX BILATERAL; 10/18/2023 9:31 pm   INDICATION: Signs/Symptoms:swelling.   COMPARISON: None.   ACCESSION NUMBER(S): [id number]   ORDERING CLINICIAN: KOJO MCCLELLAN   TECHNIQUE: 2D grayscale and color-flow duplex images were obtained along with Doppler spectral waveform analysis of the deep venous system of the lower extremity from the groin to the knee. Attempts were made to image the calf veins. Venous compression and augmentation were performed.   FINDINGS: Right Lower Extremity: There is normal compressibility and flow within the visualized vessels of the deep venous system of this lower extremity.   Left Lower Extremity: There is normal compressibility and flow within the visualized vessels of the deep venous system of this lower extremity.         No evidence for deep venous thrombosis within the limits of this examination.   Signed by: Ricky Whitehead 10/18/2023 9:41 PM Dictation workstation:   [id number]        Assessment/Plan   Principal Problem:    Acute on chronic diastolic congestive heart failure (CMS/HCC)  Active Problems:    Hyperlipidemia    Hypertension    Atrial fibrillation with RVR (CMS/HCC)    Atrial fibrillation with RVR  Noted to be in A fib RVR  Does have a history of A fib  Resume home Eliquis  Continue amiodarone  Cardiology consulted, discussed with Dr. Johnson, recommended metoprolol tartrate 50mg BID, first dose now  If no improvement in rate, may give digoxin 0.25 if potassium improves to 5 or below, if unable to give dig may also try lopressor 5mg IV Q 2 hours for sustained HR >120 if SBP >90  Monitor on tele  Admit to  SDU    Hyperkalemia  Potassium elevated at 6.1  Given IV insulin, D50, and calcium gluconate  Repeat potassium in four hours  Telemetry    Acute on Chronic Systolic Heart Failure  BNP elevated 11836  9/25/23 Echocardiogram showed EF 25-30%  Follows with Dr. Johnson outpatient  Strict I/O  Daily Weight  Takes Lasix 40mg BID at home, given IV Lasix x one dose in ED, hold further Lasix for now    Bilateral Lower Extremity Edema  LLE with erythema and warmth  US BLE    Fall  Patient fell on Sunday, laid on the floor for 12 hours, there was a concern for rhabdo, CK today 37, will hold off on fluids   Fall precautions  PT/OT    CAD   Continue ASA/statin  Troponin elevated at 30, repeat x 2  Monitor on tele  Cardiology is on consult    CKD  Creatinine 1.7, baseline appears to be 1.5-1.6  Monitor renal function closely  Avoid nephrotoxic medications  Renally dose meds  BMP in four hours    Hyperlipidemia  Statin    Hyperglycemia  Glucose 190  Will check hgbA1C    Plan  Admit to SDU  Monitor on tele  Antiarrhythmics  Given Lasix x 1, further diuresis per cardiology  Repeat BMP tonight  DVT prophylaxis: Eliquis  Cardiology consulted, appreciate recs  PT/OT  CBC and BMP in AM    CODE STATUS: Reviewed with the patient and family. She is a DNRCCA DNI.          Plan of care discussed with the patient, family, and attending.     Megha Wright, APRN-CNP

## 2023-11-14 NOTE — CONSULTS
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          Last Recorded Vitals  BP 95/58   Pulse (!) 126   Temp 36.9 °C (98.4 °F)   Resp 18   Wt 89 kg (196 lb 3.4 oz)   Shannan Coma Scale Score: 15       Relevant Results      Results for orders placed or performed during the hospital encounter of 11/14/23 (from the past 24 hour(s))   CBC and Auto Differential   Result Value Ref Range    WBC 5.6 4.4 - 11.3 x10*3/uL    nRBC 0.0 0.0 - 0.0 /100 WBCs    RBC 5.46 (H) 4.00 - 5.20 x10*6/uL    Hemoglobin 16.1 (H) 12.0 - 16.0 g/dL    Hematocrit 51.0 (H) 36.0 - 46.0 %    MCV 93 80 - 100 fL    MCH 29.5 26.0 - 34.0 pg    MCHC 31.6 (L) 32.0 - 36.0 g/dL    RDW 21.0 (H) 11.5 - 14.5 %    Platelets 275 150 - 450 x10*3/uL    Immature Granulocytes %, Automated 0.0 0.0 - 0.9 %    Immature Granulocytes Absolute, Automated 0.00 0.00 - 0.50 x10*3/uL   Comprehensive metabolic panel   Result Value Ref Range    Glucose 190 (H) 65 - 99 mg/dL    Sodium 138 133 - 145 mmol/L    Potassium 6.1 (HH) 3.4 - 5.1 mmol/L    Chloride 100 97 - 107 mmol/L    Bicarbonate 24 24 - 31 mmol/L    Urea Nitrogen 53 (H) 8 - 25 mg/dL    Creatinine 1.70 (H) 0.40 - 1.60 mg/dL    eGFR 28 (L) >60 mL/min/1.73m*2    Calcium 9.4 8.5 - 10.4 mg/dL    Albumin 3.2 (L) 3.5 - 5.0 g/dL    Alkaline Phosphatase 109 35 - 125 U/L    Total Protein 5.8 (L) 5.9 - 7.9 g/dL    AST 20 5 - 40 U/L    Bilirubin, Total 1.4 (H) 0.1 - 1.2 mg/dL    ALT 31 5 - 40 U/L    Anion Gap 14 <=19 mmol/L   NT Pro-BNP   Result Value Ref Range    PROBNP 22,012 (H) 0 - 624 pg/mL   Serial Troponin,  Initial (ELLIOTT)   Result Value Ref Range    Troponin T, High Sensitivity 30 (H) <=15 ng/L   Manual Differential   Result Value Ref Range    Neutrophils %, Manual 87.0 40.0 - 80.0 %    Bands %, Manual 3.0 0.0 - 5.0 %    Lymphocytes %, Manual 6.0 13.0 - 44.0 %    Monocytes %, Manual 4.0 2.0 - 10.0 %    Eosinophils %, Manual 0.0 0.0 - 6.0 %    Basophils %, Manual 0.0 0.0 - 2.0 %    Seg Neutrophils Absolute, Manual 4.87 1.60 - 5.00 x10*3/uL    Bands Absolute, Manual 0.17 0.00 - 0.50 x10*3/uL    Lymphocytes Absolute, Manual 0.34 (L) 0.80 - 3.00 x10*3/uL    Monocytes Absolute, Manual 0.22 0.05 - 0.80 x10*3/uL    Eosinophils Absolute, Manual 0.00 0.00 - 0.40 x10*3/uL    Basophils Absolute, Manual 0.00 0.00 - 0.10 x10*3/uL    Total Cells Counted 100     Neutrophils Absolute, Manual 5.04 1.60 - 5.50 x10*3/uL    RBC Morphology See Below     RBC Fragments Few     Ovalocytes Few     Kaiden Cells Many     Acanthocytes Many    Blood Gas Arterial Full Panel   Result Value Ref Range    POCT pH, Arterial 7.44 (H) 7.38 - 7.42 pH    POCT pCO2, Arterial 33 (L) 38 - 42 mm Hg    POCT pO2, Arterial 422 (H) 85 - 95 mm Hg    POCT SO2, Arterial 100 94 - 100 %    POCT Oxy Hemoglobin, Arterial 97.3 94.0 - 98.0 %    POCT Hematocrit Calculated, Arterial 50.0 (H) 36.0 - 46.0 %    POCT Sodium, Arterial 130 (L) 136 - 145 mmol/L    POCT Potassium, Arterial 5.6 (H) 3.5 - 5.3 mmol/L    POCT Chloride, Arterial 101 98 - 107 mmol/L    POCT Ionized Calcium, Arterial 1.13 1.10 - 1.33 mmol/L    POCT Glucose, Arterial 187 (H) 74 - 99 mg/dL    POCT Lactate, Arterial 3.6 (H) 0.4 - 2.0 mmol/L    POCT Base Excess, Arterial -0.9 -2.0 - 3.0 mmol/L    POCT HCO3 Calculated, Arterial 22.4 22.0 - 26.0 mmol/L    POCT Hemoglobin, Arterial 16.6 (H) 12.0 - 16.0 g/dL    POCT Anion Gap, Arterial 12 10 - 25 mmo/L    Patient Temperature 37.0 degrees Celsius    FiO2 85 %    Apparatus NON REBREATHER     Flow 15.0 LPM      Lab Results   Component Value Date    HGBA1C 6.0 07/06/2021       XR knee left 4+ views    Result Date: 11/14/2023  Interpreted By:  Josue Anand, STUDY: XR KNEE LEFT 4+ VIEWS; 11/14/2023 1:29 pm   INDICATION: Signs/Symptoms:fall.   COMPARISON: None available.   ACCESSION NUMBER(S): [id number]   ORDERING CLINICIAN: MICHAEL COOPER   TECHNIQUE: Views: AP lateral, and oblique views of the left knee.   FINDINGS: There is no evidence for fracture or subluxation. The left total knee prosthesis components are intact. No abnormal lucencies. No evidence for a joint effusion. No abnormal radiopaque foreign body.       Normal appearance of left total knee prosthesis. No evidence for an acute fracture or acute osseous abnormality.   Signed by: Josue Anand 11/14/2023 2:08 PM Dictation workstation:   [id number]    XR chest 1 view    Result Date: 11/14/2023  Interpreted By:  Josue Anand, STUDY: XR CHEST 1 VIEW; 11/14/2023 1:29 pm   INDICATION: Signs/Symptoms:sob.   COMPARISON: 09/27/2023   ACCESSION NUMBER(S): [id number]   ORDERING CLINICIAN: MICHAEL COOPER   TECHNIQUE: 1 view of the chest was performed.   FINDINGS: The cardiomediastinal silhouette is mildly enlarged, unchanged. Small-moderate bilateral pleural effusions, similar to the prior study. Mild prominence of the interstitium suggest congestive changes with probable mild pulmonary edema. Bibasilar airspace opacities presumed atelectasis adjacent to the pleural effusions although pneumonia should be clinically excluded.       Small-moderate bilateral pleural effusions. Bilateral airspace opacity in the lower lobes most likely atelectasis although pneumonia should be clinically excluded. Congestive changes with probable mild pulmonary edema.   Signed by: Josue Anand 11/14/2023 2:02 PM Dictation workstation:   [id number]    CT head wo IV contrast    Result Date: 11/14/2023  Interpreted By:  Josue Anand, STUDY: MICHAEL COOPER; 11/14/2023 1:06 pm   INDICATION: fall;   COMPARISON: None   ACCESSION NUMBER(S):  [id number]   ORDERING CLINICIAN: MICHAEL COOPER   TECHNIQUE: Contiguous axial images were acquired from the vertex through the posterior fossa without IV contrast. All CT examinations are performed with 1 or more of the following dose reduction techniques: Automated exposure control, adjustment of mA and/or kv according to patient's size, or use of iterative reconstruction techniques.   FINDINGS: No focal mass effect or midline shift is identified. The ventricles and sulci are symmetric and appropriate for the patient's age.   Moderate degree of nonspecific white matter change most consistent with chronic small-vessel ischemic disease. 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 The gray white matter differentiation is preserved.   No acute intracranial hemorrhage is seen. No intra-axial or extra-axial fluid collection is seen.   The visualized paranasal sinuses and mastoid air cells are clear.       No CT evidence for acute intracranial pathology.   Moderate degree of nonspecific white matter change most consistent with chronic small-vessel ischemic disease.   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   Signed by: Josue Anand 11/14/2023 1:55 PM Dictation workstation:   [id number]    CT cervical spine wo IV contrast    Result Date: 11/14/2023  Interpreted By:  Josue Anand, STUDY: CT CERVICAL SPINE WO IV CONTRAST; 11/14/2023 1:06 pm   INDICATION: Signs/Symptoms:fall;   COMPARISON: None   ACCESSION NUMBER(S): [id number]   ORDERING CLINICIAN: MICHAEL COOPER   TECHNIQUE: Contiguous axial images were acquired from the skull base to the lung apices. Coronal and sagittal reformatted images were obtained. All CT examinations are performed with 1 or more of the following dose reduction techniques: Automated exposure control, adjustment of mA and/or kv according to patient's size, or use of iterative  reconstruction techniques.   FINDINGS: There is a normal cervical lordosis. No acute fracture or traumatic malalignment is identified. Minimal degenerative appearing anterolisthesis of C6 on C7, proximally 2 mm. There is also trace degenerative anterolisthesis of C7 on T1. Facet degenerative changes and uncovertebral joint degenerative changes are present.     The occipital condyles, arch of C1, and the odontoid processes are intact. The atlantoaxial relationship is maintained with degenerative changes and calcified pannus formation.   There is moderate disc space narrowing at C3-4. Severe disc space narrowing at C4-5, C5-6, and C6-7. No evidence for high-grade bony spinal canal stenosis. However there is multilevel high-grade bilateral neural foramina stenosis.   The visualized lung apices are unremarkable.       1. No acute fracture or traumatic malalignment. 2. Degenerative disc disease and spondylosis as described in the body of the report.     Signed by: Josue Anand 11/14/2023 1:53 PM Dictation workstation:   [id number]    Vascular US lower extremity venous duplex bilateral    Result Date: 10/18/2023  Interpreted By:  Ricky Whitehead, STUDY: [facility] US LOWER EXTREMITY VENOUS DUPLEX BILATERAL; 10/18/2023 9:31 pm   INDICATION: Signs/Symptoms:swelling.   COMPARISON: None.   ACCESSION NUMBER(S): [id number]   ORDERING CLINICIAN: KOJO MCCLELLAN   TECHNIQUE: 2D grayscale and color-flow duplex images were obtained along with Doppler spectral waveform analysis of the deep venous system of the lower extremity from the groin to the knee. Attempts were made to image the calf veins. Venous compression and augmentation were performed.   FINDINGS: Right Lower Extremity: There is normal compressibility and flow within the visualized vessels of the deep venous system of this lower extremity.   Left Lower Extremity: There is normal compressibility and flow within the visualized vessels of the deep venous system of this  lower extremity.         No evidence for deep venous thrombosis within the limits of this examination.   Signed by: Ricky Whitehead 10/18/2023 9:41 PM Dictation workstation:   [id number]          Principal Problem:    Acute on chronic diastolic congestive heart failure (CMS/HCC)     Assessment/Plan   Principal Problem:    Acute on chronic diastolic congestive heart failure (CMS/HCC)      Fall; CT scan head and neck do not show any acute injury.  Trauma will sign off.       Lenin Zarco MD

## 2023-11-15 ENCOUNTER — APPOINTMENT (OUTPATIENT)
Dept: CARDIOLOGY | Facility: HOSPITAL | Age: 88
DRG: 291 | End: 2023-11-15
Payer: MEDICARE

## 2023-11-15 ENCOUNTER — PATIENT OUTREACH (OUTPATIENT)
Dept: CASE MANAGEMENT | Facility: HOSPITAL | Age: 88
End: 2023-11-15
Payer: MEDICARE

## 2023-11-15 LAB
ANION GAP SERPL CALC-SCNC: 15 MMOL/L
APPEARANCE UR: ABNORMAL
BACTERIA #/AREA URNS AUTO: ABNORMAL /HPF
BILIRUB UR STRIP.AUTO-MCNC: NEGATIVE MG/DL
BUN SERPL-MCNC: 56 MG/DL (ref 8–25)
CALCIUM SERPL-MCNC: 9.2 MG/DL (ref 8.5–10.4)
CHLORIDE SERPL-SCNC: 98 MMOL/L (ref 97–107)
CK MB CFR SERPL CALC: 3 %MB OF CK
CK MB SERPL-CCNC: 1.1 NG/ML
CO2 SERPL-SCNC: 23 MMOL/L (ref 24–31)
COLOR UR: ABNORMAL
CREAT SERPL-MCNC: 1.7 MG/DL (ref 0.4–1.6)
ERYTHROCYTE [DISTWIDTH] IN BLOOD BY AUTOMATED COUNT: 19.9 % (ref 11.5–14.5)
EST. AVERAGE GLUCOSE BLD GHB EST-MCNC: 148 MG/DL
GFR SERPL CREATININE-BSD FRML MDRD: 28 ML/MIN/1.73M*2
GLUCOSE BLD MANUAL STRIP-MCNC: 96 MG/DL (ref 74–99)
GLUCOSE SERPL-MCNC: 120 MG/DL (ref 65–99)
GLUCOSE UR STRIP.AUTO-MCNC: NORMAL MG/DL
GRAN CASTS #/AREA UR COMP ASSIST: ABNORMAL /LPF
HBA1C MFR BLD: 6.8 %
HCT VFR BLD AUTO: 47.1 % (ref 36–46)
HGB BLD-MCNC: 14.8 G/DL (ref 12–16)
HOLD SPECIMEN: NORMAL
HYALINE CASTS #/AREA URNS AUTO: ABNORMAL /LPF
KETONES UR STRIP.AUTO-MCNC: NEGATIVE MG/DL
LACTATE BLDA-SCNC: 1.9 MMOL/L (ref 0.4–2)
LEUKOCYTE ESTERASE UR QL STRIP.AUTO: ABNORMAL
MAGNESIUM SERPL-MCNC: 2.4 MG/DL (ref 1.6–3.1)
MCH RBC QN AUTO: 29.1 PG (ref 26–34)
MCHC RBC AUTO-ENTMCNC: 31.4 G/DL (ref 32–36)
MCV RBC AUTO: 93 FL (ref 80–100)
MUCOUS THREADS #/AREA URNS AUTO: ABNORMAL /LPF
NITRITE UR QL STRIP.AUTO: NEGATIVE
NRBC BLD-RTO: 0 /100 WBCS (ref 0–0)
PH UR STRIP.AUTO: 5 [PH]
PLATELET # BLD AUTO: 242 X10*3/UL (ref 150–450)
POTASSIUM SERPL-SCNC: 5.3 MMOL/L (ref 3.4–5.1)
PROT UR STRIP.AUTO-MCNC: ABNORMAL MG/DL
Q ONSET: 216 MS
QRS COUNT: 19 BEATS
QRS DURATION: 126 MS
QT INTERVAL: 352 MS
QTC CALCULATION(BAZETT): 489 MS
QTC FREDERICIA: 438 MS
R AXIS: 154 DEGREES
RBC # BLD AUTO: 5.08 X10*6/UL (ref 4–5.2)
RBC # UR STRIP.AUTO: ABNORMAL /UL
RBC #/AREA URNS AUTO: >20 /HPF
SODIUM SERPL-SCNC: 136 MMOL/L (ref 133–145)
SP GR UR STRIP.AUTO: 1.02
SQUAMOUS #/AREA URNS AUTO: ABNORMAL /HPF
T AXIS: -80 DEGREES
T OFFSET: 392 MS
UROBILINOGEN UR STRIP.AUTO-MCNC: NORMAL MG/DL
VENTRICULAR RATE: 116 BPM
WBC # BLD AUTO: 7.4 X10*3/UL (ref 4.4–11.3)
WBC #/AREA URNS AUTO: ABNORMAL /HPF

## 2023-11-15 PROCEDURE — 36415 COLL VENOUS BLD VENIPUNCTURE: CPT | Performed by: NURSE PRACTITIONER

## 2023-11-15 PROCEDURE — 36600 WITHDRAWAL OF ARTERIAL BLOOD: CPT | Performed by: NURSE PRACTITIONER

## 2023-11-15 PROCEDURE — 82947 ASSAY GLUCOSE BLOOD QUANT: CPT

## 2023-11-15 PROCEDURE — 87186 SC STD MICRODIL/AGAR DIL: CPT | Mod: WESLAB | Performed by: NURSE PRACTITIONER

## 2023-11-15 PROCEDURE — 2500000004 HC RX 250 GENERAL PHARMACY W/ HCPCS (ALT 636 FOR OP/ED): Performed by: NURSE PRACTITIONER

## 2023-11-15 PROCEDURE — 99222 1ST HOSP IP/OBS MODERATE 55: CPT | Performed by: INTERNAL MEDICINE

## 2023-11-15 PROCEDURE — 97530 THERAPEUTIC ACTIVITIES: CPT | Mod: GO

## 2023-11-15 PROCEDURE — 36600 WITHDRAWAL OF ARTERIAL BLOOD: CPT

## 2023-11-15 PROCEDURE — 93922 UPR/L XTREMITY ART 2 LEVELS: CPT

## 2023-11-15 PROCEDURE — 83605 ASSAY OF LACTIC ACID: CPT | Performed by: NURSE PRACTITIONER

## 2023-11-15 PROCEDURE — 93922 UPR/L XTREMITY ART 2 LEVELS: CPT | Performed by: INTERNAL MEDICINE

## 2023-11-15 PROCEDURE — 2060000001 HC INTERMEDIATE ICU ROOM DAILY

## 2023-11-15 PROCEDURE — 2500000002 HC RX 250 W HCPCS SELF ADMINISTERED DRUGS (ALT 637 FOR MEDICARE OP, ALT 636 FOR OP/ED): Performed by: NURSE PRACTITIONER

## 2023-11-15 PROCEDURE — 97166 OT EVAL MOD COMPLEX 45 MIN: CPT | Mod: GO

## 2023-11-15 PROCEDURE — 2500000004 HC RX 250 GENERAL PHARMACY W/ HCPCS (ALT 636 FOR OP/ED): Performed by: HOSPITALIST

## 2023-11-15 PROCEDURE — 81001 URINALYSIS AUTO W/SCOPE: CPT | Performed by: NURSE PRACTITIONER

## 2023-11-15 PROCEDURE — 85027 COMPLETE CBC AUTOMATED: CPT | Performed by: NURSE PRACTITIONER

## 2023-11-15 PROCEDURE — 97530 THERAPEUTIC ACTIVITIES: CPT | Mod: GP

## 2023-11-15 PROCEDURE — 83735 ASSAY OF MAGNESIUM: CPT | Performed by: NURSE PRACTITIONER

## 2023-11-15 PROCEDURE — 83036 HEMOGLOBIN GLYCOSYLATED A1C: CPT | Performed by: NURSE PRACTITIONER

## 2023-11-15 PROCEDURE — 2500000004 HC RX 250 GENERAL PHARMACY W/ HCPCS (ALT 636 FOR OP/ED): Performed by: INTERNAL MEDICINE

## 2023-11-15 PROCEDURE — 80048 BASIC METABOLIC PNL TOTAL CA: CPT | Performed by: NURSE PRACTITIONER

## 2023-11-15 PROCEDURE — 2500000001 HC RX 250 WO HCPCS SELF ADMINISTERED DRUGS (ALT 637 FOR MEDICARE OP): Performed by: NURSE PRACTITIONER

## 2023-11-15 PROCEDURE — 51702 INSERT TEMP BLADDER CATH: CPT

## 2023-11-15 PROCEDURE — 97162 PT EVAL MOD COMPLEX 30 MIN: CPT | Mod: GP

## 2023-11-15 PROCEDURE — 93005 ELECTROCARDIOGRAM TRACING: CPT

## 2023-11-15 PROCEDURE — 99223 1ST HOSP IP/OBS HIGH 75: CPT | Performed by: NURSE PRACTITIONER

## 2023-11-15 RX ORDER — DEXTROSE MONOHYDRATE 100 MG/ML
0.3 INJECTION, SOLUTION INTRAVENOUS ONCE AS NEEDED
Status: DISCONTINUED | OUTPATIENT
Start: 2023-11-15 | End: 2023-11-18 | Stop reason: HOSPADM

## 2023-11-15 RX ORDER — CEFTRIAXONE 1 G/50ML
1 INJECTION, SOLUTION INTRAVENOUS EVERY 24 HOURS
Status: DISCONTINUED | OUTPATIENT
Start: 2023-11-15 | End: 2023-11-18

## 2023-11-15 RX ORDER — INSULIN LISPRO 100 [IU]/ML
0-5 INJECTION, SOLUTION INTRAVENOUS; SUBCUTANEOUS
Status: DISCONTINUED | OUTPATIENT
Start: 2023-11-15 | End: 2023-11-16

## 2023-11-15 RX ORDER — DEXTROSE 50 % IN WATER (D50W) INTRAVENOUS SYRINGE
25
Status: DISCONTINUED | OUTPATIENT
Start: 2023-11-15 | End: 2023-11-18 | Stop reason: HOSPADM

## 2023-11-15 RX ORDER — MIDODRINE HYDROCHLORIDE 5 MG/1
5 TABLET ORAL EVERY 8 HOURS
Status: DISCONTINUED | OUTPATIENT
Start: 2023-11-16 | End: 2023-11-17

## 2023-11-15 RX ADMIN — FERROUS SULFATE TAB 325 MG (65 MG ELEMENTAL FE) 325 MG: 325 (65 FE) TAB at 09:00

## 2023-11-15 RX ADMIN — SODIUM CHLORIDE 250 ML: 900 INJECTION, SOLUTION INTRAVENOUS at 09:04

## 2023-11-15 RX ADMIN — METOPROLOL TARTRATE 50 MG: 50 TABLET ORAL at 08:59

## 2023-11-15 RX ADMIN — AMIODARONE HYDROCHLORIDE 200 MG: 200 TABLET ORAL at 08:59

## 2023-11-15 RX ADMIN — PANTOPRAZOLE SODIUM 40 MG: 40 TABLET, DELAYED RELEASE ORAL at 09:00

## 2023-11-15 RX ADMIN — LATANOPROST 1 DROP: 50 SOLUTION OPHTHALMIC at 21:49

## 2023-11-15 RX ADMIN — SODIUM ZIRCONIUM CYCLOSILICATE 10 G: 10 POWDER, FOR SUSPENSION ORAL at 00:51

## 2023-11-15 RX ADMIN — DORZOLAMIDE HYDROCHLORIDE AND TIMOLOL MALEATE 1 DROP: 20; 5 SOLUTION/ DROPS OPHTHALMIC at 21:49

## 2023-11-15 RX ADMIN — APIXABAN 2.5 MG: 2.5 TABLET, FILM COATED ORAL at 21:48

## 2023-11-15 RX ADMIN — APIXABAN 2.5 MG: 2.5 TABLET, FILM COATED ORAL at 09:00

## 2023-11-15 RX ADMIN — ATORVASTATIN CALCIUM 40 MG: 40 TABLET, FILM COATED ORAL at 21:48

## 2023-11-15 RX ADMIN — CEFTRIAXONE SODIUM 1 G: 1 INJECTION, SOLUTION INTRAVENOUS at 04:59

## 2023-11-15 RX ADMIN — ASPIRIN 81 MG: 81 TABLET, COATED ORAL at 09:00

## 2023-11-15 ASSESSMENT — COGNITIVE AND FUNCTIONAL STATUS - GENERAL
PERSONAL GROOMING: A LOT
WALKING IN HOSPITAL ROOM: TOTAL
TOILETING: TOTAL
DAILY ACTIVITIY SCORE: 6
TOILETING: TOTAL
TURNING FROM BACK TO SIDE WHILE IN FLAT BAD: TOTAL
MOVING TO AND FROM BED TO CHAIR: A LOT
CLIMB 3 TO 5 STEPS WITH RAILING: TOTAL
TOILETING: TOTAL
HELP NEEDED FOR BATHING: A LOT
HELP NEEDED FOR BATHING: TOTAL
HELP NEEDED FOR BATHING: A LOT
MOVING FROM LYING ON BACK TO SITTING ON SIDE OF FLAT BED WITH BEDRAILS: A LOT
HELP NEEDED FOR BATHING: A LOT
PERSONAL GROOMING: A LITTLE
STANDING UP FROM CHAIR USING ARMS: TOTAL
MOBILITY SCORE: 7
DRESSING REGULAR UPPER BODY CLOTHING: A LOT
MOBILITY SCORE: 7
MOVING TO AND FROM BED TO CHAIR: TOTAL
CLIMB 3 TO 5 STEPS WITH RAILING: A LOT
PERSONAL GROOMING: A LITTLE
MOBILITY SCORE: 7
EATING MEALS: A LOT
STANDING UP FROM CHAIR USING ARMS: TOTAL
TURNING FROM BACK TO SIDE WHILE IN FLAT BAD: TOTAL
DRESSING REGULAR UPPER BODY CLOTHING: A LOT
EATING MEALS: A LITTLE
MOVING FROM LYING ON BACK TO SITTING ON SIDE OF FLAT BED WITH BEDRAILS: A LOT
PERSONAL GROOMING: TOTAL
MOBILITY SCORE: 12
DAILY ACTIVITIY SCORE: 13
MOVING FROM LYING ON BACK TO SITTING ON SIDE OF FLAT BED WITH BEDRAILS: TOTAL
STANDING UP FROM CHAIR USING ARMS: A LOT
WALKING IN HOSPITAL ROOM: TOTAL
MOVING FROM LYING ON BACK TO SITTING ON SIDE OF FLAT BED WITH BEDRAILS: A LOT
PERSONAL GROOMING: A LITTLE
HELP NEEDED FOR BATHING: A LOT
TOILETING: TOTAL
MOVING FROM LYING ON BACK TO SITTING ON SIDE OF FLAT BED WITH BEDRAILS: A LOT
DRESSING REGULAR UPPER BODY CLOTHING: A LOT
WALKING IN HOSPITAL ROOM: TOTAL
TURNING FROM BACK TO SIDE WHILE IN FLAT BAD: TOTAL
DRESSING REGULAR LOWER BODY CLOTHING: A LOT
TOILETING: TOTAL
WALKING IN HOSPITAL ROOM: TOTAL
WALKING IN HOSPITAL ROOM: A LOT
DRESSING REGULAR LOWER BODY CLOTHING: A LOT
PERSONAL GROOMING: A LITTLE
DRESSING REGULAR UPPER BODY CLOTHING: A LOT
MOVING TO AND FROM BED TO CHAIR: TOTAL
TURNING FROM BACK TO SIDE WHILE IN FLAT BAD: A LOT
EATING MEALS: TOTAL
CLIMB 3 TO 5 STEPS WITH RAILING: TOTAL
TOILETING: A LOT
STANDING UP FROM CHAIR USING ARMS: TOTAL
MOVING TO AND FROM BED TO CHAIR: TOTAL
CLIMB 3 TO 5 STEPS WITH RAILING: TOTAL
DRESSING REGULAR LOWER BODY CLOTHING: A LOT
DRESSING REGULAR LOWER BODY CLOTHING: A LOT
CLIMB 3 TO 5 STEPS WITH RAILING: TOTAL
TURNING FROM BACK TO SIDE WHILE IN FLAT BAD: TOTAL
HELP NEEDED FOR BATHING: A LOT
STANDING UP FROM CHAIR USING ARMS: TOTAL
MOVING FROM LYING ON BACK TO SITTING ON SIDE OF FLAT BED WITH BEDRAILS: A LOT
MOBILITY SCORE: 6
DAILY ACTIVITIY SCORE: 12
DRESSING REGULAR LOWER BODY CLOTHING: A LOT
DRESSING REGULAR UPPER BODY CLOTHING: A LOT
DRESSING REGULAR LOWER BODY CLOTHING: TOTAL
DRESSING REGULAR UPPER BODY CLOTHING: TOTAL
MOVING TO AND FROM BED TO CHAIR: TOTAL

## 2023-11-15 ASSESSMENT — PAIN - FUNCTIONAL ASSESSMENT
PAIN_FUNCTIONAL_ASSESSMENT: 0-10

## 2023-11-15 ASSESSMENT — PAIN SCALES - GENERAL
PAINLEVEL_OUTOF10: 0 - NO PAIN

## 2023-11-15 ASSESSMENT — ACTIVITIES OF DAILY LIVING (ADL)
BATHING_ASSISTANCE: OTHER (COMMENT)
BATHING_ASSISTANCE: MODERATE
ADL_ASSISTANCE: NEEDS ASSISTANCE
ADLS_ADDRESSED: NO

## 2023-11-15 NOTE — NURSING NOTE
Assumed care of patient, bed side report complete. Family at bedside, patient resting, call light within reach.

## 2023-11-15 NOTE — NURSING NOTE
Pt returned from ultrasound. BSSR complete. Pt is resting in bed. Family at bedside. Call light within reach.

## 2023-11-15 NOTE — NURSING NOTE
Pt resting in bed @ present,family is visiting @ bedside,updated with plan of care.B/l le wrapped with ACE wrap,b/l feet warm to touch and reddened with palpable pulses.Pt on aspiration fall and skin preventions.OT was in to nevaluate pt.Will continue to monitor bp hr.

## 2023-11-15 NOTE — NURSING NOTE
Called hospitalist in regard to urinalysis results. Rocephin ordered. Vital signs remain stable, will continue to monitor.

## 2023-11-15 NOTE — NURSING NOTE
Pt has b/l le pitting edema,ace wrapped b/l le,elevated on pillow.A fib with controlled rate on telemetry.

## 2023-11-15 NOTE — NURSING NOTE
CHF education/book given to patient's daughter Deloris. Discussed low sodium diet, importance of daily weights, following up with physician appointments, taking medications as prescribed.

## 2023-11-15 NOTE — CONSULTS
Consults  History Of Present Illness:    Angela Ayala is a 89 y.o. female presenting with mechanical fall.     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 Please see the office note of 05/23/2012 for details. 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 As such she was placed initially on Plavix and aspirin. She is only on aspirin now. Additionally she will continue statin therapy. She will return in 4 weeks for routine follow-up.     #2 hypertension. Blood pressure is well controlled at today's office visit. She will continue losartan  25 mg daily along with the metoprolol ER 50 mg twice daily.        #3 hyperlipidemia. The patient did have lab work performed on 04/2022 with cholesterol 157 HDL 61 LDL 81 trig 81. We will continue atorvastatin 40 mg daily.     #4 complete left bundle-branch block. Upon review of previous EKG tracings, the left bundle branch block was not present in 2008 but was observed on EKG tracings in 2010. As such the left bundle branch block has been present for greater than 5 years and this presumably related to age and/or hypertension.     #5 ? CAD with diagnosis not confirmed by negative pharmacologic nuclear stress test 04/16/2010.     #6 status post left total knee replacement 04/20/2010.     #7 GERD.     #8, glaucoma     #9 low-grade carotid vascular disease      #10 status post right sided cataract extraction 3/26/2015     #11 Afib with RVR. Patient was seen at [facility] for new onset afib with RVR. Now on metoprolol and amiodarone.  Is on Eliquis.      #12  VI with bilateral pedal edema and treatment for cellulitis.  Has home care for wraps.  Component of CHF, but also non compliance with diuretics due to incontinence of urine.    This patient was  recently recently diagnosed as having atrial fibrillation in early 8/2023 and was placed on metoprolol and Eliquis.  She potentially was not compliant with the Eliquis anticoagulation.  The patient developed some atypical chest discomfort on the morning of 9/25/2023 and she was brought to [facility] emergency room.  She had a persistently rapid rate to the atrial fibrillation and the Toprol-XL was increased to 100 mg twice daily and she was restarted on Eliquis 5 mg twice daily.  Her echocardiogram showed significant biatrial enlargement severely reduced LV ejection fraction that was a new finding.  She was placed on Lasix 40 mg EKG.  Her EKG was nondiagnostic due to left bundle branch block conduction delay.  Given her advanced age she was thought not to be a candidate for an invasive strategy for her presumptive underlying coronary artery disease.  The LV ejection fraction was 25-30%.  She was started on amiodarone 200 mg daily to improve ventricular rate control.  Clinically she was thought to be a poor candidate for biventricular ICD given her age of 89 years and comorbidities.  An attempt was made to begin low-dose Entresto 24/26 mg twice daily.  Ultimately the ventricular rate of the atrial fibrillation improved to the lower 90/min range.  Her creatinine at time of discharge was 1.5.    The patient's son is present and able to provide some information.  The patient evidently had fallen at home this past weekend on 11/12/2023.  She evidently was on the floor for period of approximately 12 hours before being detected by family members.  She was assisted and felt relatively well and immediate medical attention was not pursued.  The following day the patient was feeling better but the next day 11/14 she had more significant pain in the left knee was unable to walk and as such presented to the [facility] emergency room to be further evaluated.  According to the son her toes are very discolored  purplish to black in appearance.  Of note the patient had been instructed to increase her Lasix to 40 mg twice daily several weeks ago after office visit.  The patient however had not been compliant with her medications and had been removing the Lasix from her pill dispenser.  Upon her arrival to the emergency room she was noted to have a more rapid ventricular rate of the atrial fibrillation up to 130-140/min.  Patient was evaluated in the emergency room and vascular consultation was requested due to the appearance of her legs.  The patient's evaluation in the emergency room was notable for a systolic blood pressure in the 90 mmHg range.  The proBNP was elevated in the range of 22,000.  Chest x-ray showed small to moderate bilateral pleural effusions bilateral airspace opacities likely atelectasis possible mild pulmonary edema.  She was hyperkalemic with a potassium of 6.1 BUN 53 creatinine 1.7.  Glucose was 190.  The patient was given a Kayexalate in the emergency room and was placed on metoprolol tartrate 50 mg twice daily for the atrial fibrillation.  A dose of IV digoxin was recommended presuming her potassium could be lowered to less than 5.0.  Upon repeat the potassium was 5.1 and the digoxin was not utilized.  She was however given IV metoprolol as needed for rapid ventricular rates to the atrial fibrillation.  The patient of note did have an inpatient echocardiogram on 9/25/2023 that showed a significantly reduced LV ejection fraction at 25-30% with abnormal septal motion due to left bundle branch block conduction delay.  She had severe left and right atrial enlargement moderate mitral annular calcification aortic valve sclerosis moderate pulmonary hypertension estimated PA systolic pressure 49 mmHg.     Last Recorded Vitals:  Vitals:    11/15/23 0800 11/15/23 0815 11/15/23 0849 11/15/23 1031   BP: 114/88 114/88 117/63    BP Location:   Right arm    Patient Position:  Lying Sitting    Pulse: 88 89 100 100    Resp: 19 26 21    Temp:   35.7 °C (96.3 °F)    TempSrc:   Temporal    SpO2:   99% 92%   Weight:       Height:           Last Labs:  CBC - 11/15/2023:  6:57 AM  7.4 14.8 242    47.1      CMP - 11/15/2023:  6:57 AM  9.2 5.8 20 --- 1.4   _ 3.2 31 109      PTT - 9/25/2023:  2:08 AM  1.1   11.4 25.4     Hemoglobin A1C   Date/Time Value Ref Range Status   11/15/2023 06:57 AM 6.8 (H) See below % Final   07/06/2021 12:08 PM 6.0 % Final     Comment:          Diagnosis of Diabetes-Adults   Non-Diabetic: < or = 5.6%   Increased risk for developing diabetes: 5.7-6.4%   Diagnostic of diabetes: > or = 6.5%  .       Monitoring of Diabetes                Age (y)     Therapeutic Goal (%)   Adults:          >18           <7.0   Pediatrics:    13-18           <7.5                   7-12           <8.0                   0- 6            7.5-8.5   American Diabetes Association. Diabetes Care 33(S1), Jan 2010.     10/15/2020 09:37 AM 6.1 % Final     Comment:          Diagnosis of Diabetes-Adults   Non-Diabetic: < or = 5.6%   Increased risk for developing diabetes: 5.7-6.4%   Diagnostic of diabetes: > or = 6.5%  .       Monitoring of Diabetes                Age (y)     Therapeutic Goal (%)   Adults:          >18           <7.0   Pediatrics:    13-18           <7.5                   7-12           <8.0                   0- 6            7.5-8.5   American Diabetes Association. Diabetes Care 33(S1), Jan 2010.       LDL Calculated   Date/Time Value Ref Range Status   09/26/2023 06:03 AM 45 (L) 65 - 130 MG/DL Final     VLDL   Date/Time Value Ref Range Status   04/18/2022 02:10 PM 14 0 - 40 mg/dL Final   07/06/2021 12:08 PM 21 0 - 40 mg/dL Final   10/15/2020 09:37 AM 20 0 - 40 mg/dL Final      Last I/O:  I/O last 3 completed shifts:  In: 50 (0.6 mL/kg) [IV Piggyback:50]  Out: 515 (5.8 mL/kg) [Urine:515 (0.2 mL/kg/hr)]  Weight: 89 kg     Past Cardiology Tests (Last 3 Years):  EKG:  Electrocardiogram, 12-lead PRN ACS symptoms  "11/15/2023    Echo:  No results found for this or any previous visit from the past 1095 days.    Ejection Fractions:  No results found for: \"EF\"  Cath:  No results found for this or any previous visit from the past 1095 days.    Stress Test:  No results found for this or any previous visit from the past 1095 days.    Cardiac Imaging:  No results found for this or any previous visit from the past 1095 days.      Past Medical History:  She has a past medical history of Age-related nuclear cataract, right eye (10/09/2019), Age-related nuclear cataract, right eye (10/09/2019), Atrial fibrillation (CMS/HCC), CHF (congestive heart failure) (CMS/[facility]), Chronic kidney disease, Dyslipidemia, GERD (gastroesophageal reflux disease), Glaucoma, Heart disease, Hypertension, Myocardial infarction (CMS/[facility]), Osteoporosis, Other conditions influencing health status, Other conditions influencing health status, Other conditions influencing health status, Personal history of other diseases of the nervous system and sense organs (08/12/2019), Personal history of other diseases of the nervous system and sense organs (10/09/2019), Personal history of transient ischemic attack (TIA), and cerebral infarction without residual deficits (08/12/2019), Stroke (CMS/[facility]), and Unspecified cataract.    Past Surgical History:  She has a past surgical history that includes Other surgical history (08/27/2014); Other surgical history (07/19/2013); Other surgical history (07/19/2013); MR angio head wo IV contrast (04/26/2012); and Knee surgery (Bilateral).      Social History:  She reports that she has never smoked. She has never used smokeless tobacco. She reports that she does not use drugs. No history on file for alcohol use.    Family History:  Family History   Problem Relation Name Age of Onset    Colon cancer Mother      Stroke Father      Glaucoma Father          Allergies:  Penicillins    Inpatient Medications:  Scheduled medications   Medication " Dose Route Frequency    amiodarone  200 mg oral Daily    apixaban  2.5 mg oral BID    aspirin  81 mg oral Daily    atorvastatin  40 mg oral Nightly    cefTRIAXone  1 g intravenous q24h    dorzolamide-timoloL  1 drop Both Eyes Nightly    ferrous sulfate (325 mg ferrous sulfate)  1 tablet oral Daily with breakfast    insulin lispro  0-5 Units subcutaneous TID with meals    latanoprost  1 drop Both Eyes Nightly    metoprolol tartrate  50 mg oral BID    pantoprazole  40 mg oral Daily     PRN medications   Medication    acetaminophen    Or    acetaminophen    Or    acetaminophen    bisacodyl    dextrose 10 % in water (D10W)    dextrose    glucagon    oxygen     Continuous Medications   Medication Dose Last Rate     Outpatient Medications:  Current Outpatient Medications   Medication Instructions    amiodarone (PACERONE) 200 mg, oral, Daily    aspirin 81 mg EC tablet 1 tablet, oral, Daily    atorvastatin (Lipitor) 40 mg tablet 1 tablet, oral, Daily    bimatoprost (Lumigan) 0.01 % ophthalmic solution 1 drop, Both Eyes, Nightly    calcium carbonate 600 mg calcium (1,500 mg) tablet 1 tablet, oral, Daily    dorzolamide-timoloL (Cosopt) 22.3-6.8 mg/mL ophthalmic solution 1 drop, ophthalmic (eye), Daily RT    Eliquis 5 mg, oral, 2 times daily    ferrous sulfate 325 (65 Fe) MG tablet 1 tablet, oral, Daily    furosemide (LASIX) 40 mg, oral, 2 times daily    losartan (COZAAR) 25 mg, oral, Daily    metoprolol succinate XL (TOPROL-XL) 100 mg, oral, Daily RT    omeprazole (PriLOSEC) 20 mg DR capsule 1 capsule, oral, Daily       Physical Exam:  The patient is a somewhat well appearing moderately overweight but upper elderly white female sitting upright in bed without any respiratory distress on nasal cannula.  JVP not elevated carotid and pulses 2+ no palpable thyroid enlargement  Chest fair air movement breath sounds clear somewhat diminished  Cardiac rhythm irregularly irregular moderate variability normal S1-S2 minimal systolic  murmur no gallop.  Abdomen is soft obese nontender no paraspinal megaly  This 1-2+ lower extremity edema.  There is venous congestion purplish discoloration of the toes bilaterally.     Assessment/Plan   11/15: This 89-year-old white female has a past history including hypertension, hyperlipidemia, left bundle branch block conduction delay, suspected coronary artery disease, low-grade carotid vascular disease, remote right hemispheric CVA.  She was detected as having new onset atrial fibrillation in 8/2023 started on metoprolol and Eliquis.  She was admitted with atypical chest pain and 9/2023 possible non-STEMI from demand ischemia.  Echocardiogram at that time showed a significant reduction in the patient's LV ejection fraction to 25-30% as a new finding with both atria being severely enlarged.  She was noted to have a left bundle branch block conduction delay.  She was thought not to be a good candidate for invasive strategy including catheterization biventricular ICD.  She had poor at that point was continued amiodarone and metoprolol for treatment of the atrial fibrillation plus eliquis anticoagulation.  She did have some chronic kidney disease with baseline creatinine 1.5.  Patient currently admitted after mechanical fall initially had a rapid ventricular rate to the atrial fibrillation for which her metoprolol was increased.  Blood pressures however have been relatively low and will need to be judicious with the metoprolol.  Patient has been seen by vascular surgery for suspected venous less likely arterial peripheral disease results of studies are pending.  Peripheral IV 10/18/23 20 G Left Antecubital (Active)   Site Assessment Clean;Dry;Intact 11/15/23 0815   Line Status Blood return noted 11/15/23 0815   Dressing Status Clean;Dry;Occlusive 11/15/23 0815   Number of days: 28       Urethral Catheter (Active)   Site Assessment Clean;Skin intact 11/15/23 0815   Collection Container Standard drainage bag  11/15/23 0815   Securement Method Securing device (Describe) 11/15/23 0815   $ Urethral Catheter Charge Indwelling cath 11/15/23 0815   Number of days: 1       Code Status:  DNR and No Intubation    I spent 30 minutes in the professional and overall care of this patient.        Kwame Johnson MD

## 2023-11-15 NOTE — CARE PLAN
Problem: Pain - Adult  Goal: Verbalizes/displays adequate comfort level or baseline comfort level  Outcome: Progressing   The patient's goals for the shift include  rest    The clinical goals for the shift include vital signs stable    Over the shift, the patient did not make progress toward the following goals. Barriers to progression include   Problem: Safety - Adult  Goal: Free from fall injury  Outcome: Progressing     Problem: Discharge Planning  Goal: Discharge to home or other facility with appropriate resources  Outcome: Progressing     Problem: Chronic Conditions and Co-morbidities  Goal: Patient's chronic conditions and co-morbidity symptoms are monitored and maintained or improved  Outcome: Progressing     Problem: Skin  Goal: Decreased wound size/increased tissue granulation at next dressing change  Outcome: Progressing  Goal: Participates in plan/prevention/treatment measures  Outcome: Progressing  Goal: Prevent/manage excess moisture  Outcome: Progressing  Goal: Prevent/minimize sheer/friction injuries  Outcome: Progressing  Goal: Promote/optimize nutrition  Outcome: Progressing  Goal: Promote skin healing  Outcome: Progressing

## 2023-11-15 NOTE — PROGRESS NOTES
Angela Ayala is a 89 y.o. female on day 1 of admission presenting with Acute on chronic diastolic congestive heart failure (CMS/HCC).      Subjective   Patient seen and examined. Awake/alert/oriented. Resting in bed. Denies chest pain, shortness of breath, fevers, chills, nausea, or vomiting. No abdominal discomfort.          Objective     Last Recorded Vitals  /63 (BP Location: Right arm, Patient Position: Sitting)   Pulse 100   Temp 35.7 °C (96.3 °F) (Temporal)   Resp 21   Wt 89 kg (196 lb 3.4 oz)   SpO2 99%   Intake/Output last 3 Shifts:    Intake/Output Summary (Last 24 hours) at 11/15/2023 1132  Last data filed at 11/15/2023 0600  Gross per 24 hour   Intake 50 ml   Output 515 ml   Net -465 ml       Admission Weight  Weight: 89 kg (196 lb 3.4 oz) (11/14/23 1311)    Daily Weight  11/14/23 : 89 kg (196 lb 3.4 oz)    Image Results  Electrocardiogram, 12-lead PRN ACS symptoms  Atrial fibrillation with rapid ventricular response  Left bundle branch block  Abnormal ECG  When compared with ECG of 14-NOV-2023 12:35, (unconfirmed)  No significant change was found  Confirmed by Gus Neely ([id number]) on 11/15/2023 10:27:28 AM  Lower extremity venous duplex bilateral  Narrative: Interpreted By:  Helio Sparrow,   STUDY:  [facility] LOWER EXTREMITY VENOUS DUPLEX BILATERAL;  11/14/2023 6:39 pm      INDICATION:  Signs/Symptoms:BLE edema, LLE pain.      COMPARISON:  10/18/2023      ACCESSION NUMBER(S):  [id number]      ORDERING CLINICIAN:  NEHAL MANZANO      TECHNIQUE:  Vascular ultrasound of the bilateral lower extremities was performed.  Real-time compression views as well as Gray scale, color Doppler and  spectral Doppler waveform analysis was performed.      FINDINGS:  Evaluation of the visualized portions of the bilateral common femoral  vein, proximal, mid, and distal femoral vein, and popliteal vein were  performed.  Evaluation of the visualized portions of the  posterior  tibial and peroneal veins were also  performed.          The evaluated veins demonstrate normal compressibility. There is  intact venous flow demonstrating normal respiratory variability and  normal augmentation of flow with calf compression. Therefore, there  is no ultrasonographic evidence for deep vein thrombosis within the  evaluated veins. Limited characterization of the calf veins.          Impression: 1. No DVT visualized portions bilateral lower extremities.      MACRO:  None      Signed by: Helio Sparrow 11/15/2023 8:40 AM  Dictation workstation:   [id number]      Physical Exam  Constitutional:       Appearance: Normal appearance.   HENT:      Head: Normocephalic and atraumatic.   Eyes:      Extraocular Movements: Extraocular movements intact.      Conjunctiva/sclera: Conjunctivae normal.   Cardiovascular:      Rate and Rhythm: Normal rate. Rhythm irregular.   Pulmonary:      Effort: Pulmonary effort is normal.      Breath sounds: No wheezing, rhonchi or rales.   Abdominal:      General: Bowel sounds are normal.      Tenderness: There is no abdominal tenderness.   Musculoskeletal:      Right lower leg: Edema present.      Left lower leg: Edema present.   Skin:     General: Skin is warm and dry.   Neurological:      General: No focal deficit present.      Mental Status: She is alert and oriented to person, place, and time.         Relevant Results  Lab Results   Component Value Date    GLUCOSE 144 (H) 11/14/2023    CALCIUM 9.4 11/14/2023     11/14/2023    K 5.0 11/14/2023    CO2 25 11/14/2023    CL 96 (L) 11/14/2023    BUN 54 (H) 11/14/2023    CREATININE 1.70 (H) 11/14/2023     Lab Results   Component Value Date    WBC 7.4 11/15/2023    HGB 14.8 11/15/2023    HCT 47.1 (H) 11/15/2023    MCV 93 11/15/2023     11/15/2023     Electrocardiogram, 12-lead PRN ACS symptoms    Result Date: 11/15/2023  Atrial fibrillation with rapid ventricular response Left bundle branch block Abnormal ECG When compared with ECG of 14-NOV-2023 12:35,  (unconfirmed) No significant change was found Confirmed by Gus Neely ([id number]) on 11/15/2023 10:27:28 AM    Lower extremity venous duplex bilateral    Result Date: 11/15/2023  Interpreted By:  Helio Sparrow, STUDY: [facility] US LOWER EXTREMITY VENOUS DUPLEX BILATERAL;  11/14/2023 6:39 pm   INDICATION: Signs/Symptoms:BLE edema, LLE pain.   COMPARISON: 10/18/2023   ACCESSION NUMBER(S): [id number]   ORDERING CLINICIAN: NEHAL MANZANO   TECHNIQUE: Vascular ultrasound of the bilateral lower extremities was performed. Real-time compression views as well as Gray scale, color Doppler and spectral Doppler waveform analysis was performed.   FINDINGS: Evaluation of the visualized portions of the bilateral common femoral vein, proximal, mid, and distal femoral vein, and popliteal vein were performed.  Evaluation of the visualized portions of the  posterior tibial and peroneal veins were also performed.     The evaluated veins demonstrate normal compressibility. There is intact venous flow demonstrating normal respiratory variability and normal augmentation of flow with calf compression. Therefore, there is no ultrasonographic evidence for deep vein thrombosis within the evaluated veins. Limited characterization of the calf veins.         1. No DVT visualized portions bilateral lower extremities.   MACRO: None   Signed by: Helio Sparrow 11/15/2023 8:40 AM Dictation workstation:   [id number]    XR knee left 4+ views    Result Date: 11/14/2023  Interpreted By:  Josue Anand, STUDY: XR KNEE LEFT 4+ VIEWS; 11/14/2023 1:29 pm   INDICATION: Signs/Symptoms:fall.   COMPARISON: None available.   ACCESSION NUMBER(S): [id number]   ORDERING CLINICIAN: MICHAEL COOPER   TECHNIQUE: Views: AP lateral, and oblique views of the left knee.   FINDINGS: There is no evidence for fracture or subluxation. The left total knee prosthesis components are intact. No abnormal lucencies. No evidence for a joint effusion. No abnormal radiopaque foreign  body.       Normal appearance of left total knee prosthesis. No evidence for an acute fracture or acute osseous abnormality.   Signed by: Josue Anand 11/14/2023 2:08 PM Dictation workstation:   [id number]    XR chest 1 view    Result Date: 11/14/2023  Interpreted By:  Josue Anand, STUDY: XR CHEST 1 VIEW; 11/14/2023 1:29 pm   INDICATION: Signs/Symptoms:sob.   COMPARISON: 09/27/2023   ACCESSION NUMBER(S): [id number]   ORDERING CLINICIAN: MICHAEL COOPER   TECHNIQUE: 1 view of the chest was performed.   FINDINGS: The cardiomediastinal silhouette is mildly enlarged, unchanged. Small-moderate bilateral pleural effusions, similar to the prior study. Mild prominence of the interstitium suggest congestive changes with probable mild pulmonary edema. Bibasilar airspace opacities presumed atelectasis adjacent to the pleural effusions although pneumonia should be clinically excluded.       Small-moderate bilateral pleural effusions. Bilateral airspace opacity in the lower lobes most likely atelectasis although pneumonia should be clinically excluded. Congestive changes with probable mild pulmonary edema.   Signed by: Josue Anand 11/14/2023 2:02 PM Dictation workstation:   [id number]    CT head wo IV contrast    Result Date: 11/14/2023  Interpreted By:  Josue Anand, STUDY: MICHAEL COOPER; 11/14/2023 1:06 pm   INDICATION: fall;   COMPARISON: None   ACCESSION NUMBER(S): [id number]   ORDERING CLINICIAN: MICHAEL COOPER   TECHNIQUE: Contiguous axial images were acquired from the vertex through the posterior fossa without IV contrast. All CT examinations are performed with 1 or more of the following dose reduction techniques: Automated exposure control, adjustment of mA and/or kv according to patient's size, or use of iterative reconstruction techniques.   FINDINGS: No focal mass effect or midline shift is identified. The ventricles and sulci are symmetric and appropriate for the patient's age.   Moderate degree of  nonspecific white matter change most consistent with chronic small-vessel ischemic disease. 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 The gray white matter differentiation is preserved.   No acute intracranial hemorrhage is seen. No intra-axial or extra-axial fluid collection is seen.   The visualized paranasal sinuses and mastoid air cells are clear.       No CT evidence for acute intracranial pathology.   Moderate degree of nonspecific white matter change most consistent with chronic small-vessel ischemic disease.   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   Signed by: Josue Anand 11/14/2023 1:55 PM Dictation workstation:   [id number]    CT cervical spine wo IV contrast    Result Date: 11/14/2023  Interpreted By:  Josue Anand, STUDY: CT CERVICAL SPINE WO IV CONTRAST; 11/14/2023 1:06 pm   INDICATION: Signs/Symptoms:fall;   COMPARISON: None   ACCESSION NUMBER(S): [id number]   ORDERING CLINICIAN: MICHAEL COOPER   TECHNIQUE: Contiguous axial images were acquired from the skull base to the lung apices. Coronal and sagittal reformatted images were obtained. All CT examinations are performed with 1 or more of the following dose reduction techniques: Automated exposure control, adjustment of mA and/or kv according to patient's size, or use of iterative reconstruction techniques.   FINDINGS: There is a normal cervical lordosis. No acute fracture or traumatic malalignment is identified. Minimal degenerative appearing anterolisthesis of C6 on C7, proximally 2 mm. There is also trace degenerative anterolisthesis of C7 on T1. Facet degenerative changes and uncovertebral joint degenerative changes are present.     The occipital condyles, arch of C1, and the odontoid processes are intact. The atlantoaxial relationship is maintained with degenerative changes and calcified pannus formation.   There is  moderate disc space narrowing at C3-4. Severe disc space narrowing at C4-5, C5-6, and C6-7. No evidence for high-grade bony spinal canal stenosis. However there is multilevel high-grade bilateral neural foramina stenosis.   The visualized lung apices are unremarkable.       1. No acute fracture or traumatic malalignment. 2. Degenerative disc disease and spondylosis as described in the body of the report.     Signed by: Josue Anand 11/14/2023 1:53 PM Dictation workstation:   [id number]    Vascular US lower extremity venous duplex bilateral    Result Date: 10/18/2023  Interpreted By:  Ricky Whitehead, STUDY: [facility] US LOWER EXTREMITY VENOUS DUPLEX BILATERAL; 10/18/2023 9:31 pm   INDICATION: Signs/Symptoms:swelling.   COMPARISON: None.   ACCESSION NUMBER(S): [id number]   ORDERING CLINICIAN: KOJO MCCLELLAN   TECHNIQUE: 2D grayscale and color-flow duplex images were obtained along with Doppler spectral waveform analysis of the deep venous system of the lower extremity from the groin to the knee. Attempts were made to image the calf veins. Venous compression and augmentation were performed.   FINDINGS: Right Lower Extremity: There is normal compressibility and flow within the visualized vessels of the deep venous system of this lower extremity.   Left Lower Extremity: There is normal compressibility and flow within the visualized vessels of the deep venous system of this lower extremity.         No evidence for deep venous thrombosis within the limits of this examination.   Signed by: Ricky Whitehead 10/18/2023 9:41 PM Dictation workstation:   [id number]          Assessment/Plan      Principal Problem:    Acute on chronic diastolic congestive heart failure (CMS/HCC)  Active Problems:    Hyperlipidemia    Hypertension    Atrial fibrillation with RVR (CMS/HCC)    Atrial fibrillation with RVR  Noted to be in A fib RVR  Does have a history of A fib, now rate controlled  Continue Eliquis, renally dosed  Continue  amiodarone and metoprolol  Cardiology consulted, appreciate recs  Monitor on tele    Hypotension  Hypotensive overnight with a systolic in the 70s, given 250mL bolus with improvement  Monitor close     Hyperkalemia  Potassium elevated at 6.1, 5.0, pending AM labs  monitor  On tele     Acute on Chronic Systolic Heart Failure  BNP elevated 44050  9/25/23 Echocardiogram showed EF 25-30%  Follows with Dr. Johnson outpatient  Strict I/O  Daily Weight  Takes Lasix 40mg BID at home, given IV Lasix x one dose in ED, hold further Lasix for now    Acute UTI  UA positive, culture pending  IV ceftriaxone  Lactic acid was elevated, repeat pending     Bilateral Lower Extremity Edema  LLE with erythema and warmth  US BLE negative for DVT  Noted purple discolor to toes, ABIs ordered and vascular surgery consulted     Fall  Patient fell on Sunday, laid on the floor for 12 hours, there was a concern for rhabdo, CK today 37, 29, 39, will hold off on fluids   Fall precautions  PT/OT     CAD   Continue ASA/statin  Troponin elevated at 30, 29, 30, 34  Monitor on tele  Cardiology is on consult     CKD  Creatinine 1.7, baseline appears to be 1.5-1.6  Monitor renal function closely  Avoid nephrotoxic medications  Renally dose meds  BMP in four hours     Hyperlipidemia  Statin     Hyperglycemia/DM II, not previously diagnosed  Glucose 190  Will check hgbA1C, 6.8, diabetes educator consulted     Plan  Admit to SDU  Monitor on tele  Antiarrhythmics  Hold diuresis for now  DVT prophylaxis: Eliquis  Cardiology consulted, appreciate recs  PT/OT  CBC and BMP in AM     CODE STATUS: Reviewed with the patient and family. She is a DNRCCA DNI.              Megha Wright, APRN-CNP

## 2023-11-15 NOTE — NURSING NOTE
Pt was seen by Chandana Amador small,doppled pulses.Noted the toes of her right foot is mottled,toes are cool to touch.Pt denies any pain on activity and or @ rest.Pitting edema of BL le noted,pt denies claudication,but does not ambulate long distances.

## 2023-11-15 NOTE — PROGRESS NOTES
MSW met with pt and son at bedside to introduce self and role of dc planning care coordination. Pt has been seen by PT who is recommending SNF. MSW provided the list to pt and son Gus to review. Will need to follow up tomorrow for choices. Pt will require a precert.    Safe dc plan not secured.     Genna DURÁNA, LSW

## 2023-11-15 NOTE — PROGRESS NOTES
Met with patients son, Gus, while patient was having testing done in patient room. Patient lives alone and Gus and his sister help with her care. She recently was dx with afib and since then she has become weaker. She does not take her meds as prescribed. She takes out her lasix and refuses to take it. Gus states that they may have to hire someone to be with her and help ensure she takes her meds and is safe. We talked about her EF 25-30%. She does have [facility] and is current with Linda GRIFFITHS and Dr. Johnson.

## 2023-11-15 NOTE — PROGRESS NOTES
Physical Therapy    Physical Therapy Evaluation & Treatment    Patient Name: Angela Ayala  MRN: [id number]  Today's Date: 11/15/2023   Time Calculation  Start Time: 1031  Stop Time: 1051  Time Calculation (min): 20 min    Assessment/Plan   PT Assessment  PT Assessment Results: Decreased strength, Decreased range of motion, Decreased endurance, Impaired balance, Decreased mobility, Decreased coordination  Rehab Prognosis: Good  Evaluation/Treatment Tolerance: Patient tolerated treatment well  Medical Staff Made Aware: Yes  Strengths: Ability to acquire knowledge, Premorbid level of function, Support of extended family/friends  Barriers to Participation: Comorbidities  End of Session Communication: Bedside nurse  Assessment Comment: Pt demonstrates impaired functional mobility from baseline level; pt with BLE weakness, impaired BLE ROM, impaired balance, and decreased overall activity tolerance. Pt with h/o recent fall and remains a falls risk; is not safe to return home alone at this time.  End of Session Patient Position: Bed, 3 rail up, Alarm on  IP OR SWING BED PT PLAN  Inpatient or Swing Bed: Inpatient  PT Plan  Treatment/Interventions: Bed mobility, Transfer training, Gait training, Balance training, Therapeutic exercise, Therapeutic activity, Range of motion, Endurance training, Strengthening, Neuromuscular re-education  PT Plan: Skilled PT  PT Frequency: 5 times per week  PT Discharge Recommendations: Moderate intensity level of continued care  Equipment Recommended upon Discharge: Wheeled walker  PT Recommended Transfer Status: Assist x2  PT - OK to Discharge: Yes    Subjective     General Visit Information:  General  Reason for Referral: impaired functional mobility; CHF  Referred By: Arlin Garduno DO  Past Medical History Relevant to Rehab: CHF, CKD, dyslipidemia, GERD, glaucoma, HTN, MI, OP, TIA, CVA, B knee sx.  Family/Caregiver Present: No  Caregiver Feedback: n/a  Prior to Session Communication:  Bedside nurse  Patient Position Received: Up in chair  Preferred Learning Style: verbal  General Comment: Pt cleared for therapy via RN, received in sitting, NAD, agreeable to participate in therapy. (+) telemetry, 2L O2 via NC (Pt is a 89 year-old F admitted from home with a fall where she laid on the floor for 12 hrs; radiology negative for acute findings.)  Home Living:  Home Living  Type of Home: House  Lives With: Alone  Home Adaptive Equipment: Walker rolling or standard, Other (Comment) (transport chair)  Home Layout: One level  Home Access: Level entry  Entrance Stairs-Rails: None  Bathroom Shower/Tub: Tub/shower unit  Bathroom Toilet: Standard  Bathroom Equipment: None  Prior Level of Function:  Prior Function Per Pt/Caregiver Report  Level of [place]: Needs assistance with ADLs, Needs assistance with homemaking  Receives Help From: Family (son/DIL)  ADL Assistance: Needs assistance  Bath: Other (Comment) (assist from DIL)  Homemaking Assistance: Needs assistance  Homemaking Assistance Comments: assist from family for iADLs  Ambulatory Assistance: Independent (mod indep)  Vocational: Retired  Hand Dominance: Right  Prior Function Comments: DIL assists with sponge bathing, family drives and performs iADLs  Precautions:  Precautions  Hearing/Visual Limitations: B hearing aids, reading glasses  Medical Precautions: Fall precautions, Oxygen therapy device and L/min (2L O2 via NC)  Vital Signs:  Vital Signs  Heart Rate: 100  SpO2: 92 %    Objective   Pain:  Pain Assessment  Pain Assessment: 0-10  Pain Score: 0 - No pain  Cognition:  Cognition  Overall Cognitive Status: Within Functional Limits    General Assessments:  General Observation  General Observation: BLE edema, dry skin throughout     Activity Tolerance  Endurance: Decreased tolerance for upright activites    Sensation  Light Touch: No apparent deficits    Strength  Strength Comments: > 2+/5 BLE    Coordination  Movements are Fluid and Coordinated:  Yes  Coordination Comment: decreased rate of movements    Postural Control  Postural Control: Impaired  Posture Comment: forward head, rounded shoulders    Static Sitting Balance  Static Sitting-Balance Support: Bilateral upper extremity supported  Static Sitting-Level of Assistance: Moderate assistance    Static Standing Balance  Static Standing-Balance Support: Bilateral upper extremity supported  Static Standing-Level of Assistance: Maximum assistance  Functional Assessments:  ADL  ADL's Addressed: No    Bed Mobility  Bed Mobility: Yes  Bed Mobility 1  Bed Mobility 1: Sitting to supine  Level of Assistance 1: Maximum assistance (xw2)  Bed Mobility Comments 1: max assist x 2 for controlled trunk descent and lifting BLE onto bed. dep x 2 boost towards HOB    Transfers  Transfer: Yes  Transfer 1  Transfer From 1: Sit to  Transfer to 1: Stand  Technique 1: Sit to stand  Transfer Device 1: Walker  Transfer Level of Assistance 1: Maximum assistance, +2  Trials/Comments 1: sit > stand with RW, max assist x 2 for forward weight shift shift and balance, pt pulling up on braced RW with RLE  Transfers 2  Transfer From 2: Stand to  Transfer to 2: Sit  Technique 2: Stand to sit  Transfer Device 2: Walker  Trials/Comments 2: stand > sit with max assist x 2 for eccentric control onto bed    Ambulation/Gait Training  Ambulation/Gait Training Performed: Yes  Ambulation/Gait Training 1  Surface 1: Level tile  Device 1: Rolling walker  Assistance 1: Maximum assistance (x2)  Quality of Gait 1:  (decreased B step length, decreased ghazala, unsteady gait)  Comments/Distance (ft) 1: 2-3 steps chair > bed    Stairs  Stairs: No     Extremity/Trunk Assessments:  RLE   RLE : Exceptions to WFL (ankle dorsiflexion AROM to neutral only)  LLE   LLE : Exceptions to WFL (ankle dorsiflexion AROM to neutral only)  Treatments:     Bed Mobility  Bed Mobility: Yes  Bed Mobility 1  Bed Mobility 1: Sitting to supine  Level of Assistance 1: Maximum  assistance (xw2)  Bed Mobility Comments 1: max assist x 2 for controlled trunk descent and lifting BLE onto bed. dep x 2 boost towards HOB    Ambulation/Gait Training  Ambulation/Gait Training Performed: Yes  Ambulation/Gait Training 1  Surface 1: Level tile  Device 1: Rolling walker  Assistance 1: Maximum assistance (x2)  Quality of Gait 1:  (decreased B step length, decreased ghazala, unsteady gait)  Comments/Distance (ft) 1: 2-3 steps chair > bed  Transfers  Transfer: Yes  Transfer 1  Transfer From 1: Sit to  Transfer to 1: Stand  Technique 1: Sit to stand  Transfer Device 1: Walker  Transfer Level of Assistance 1: Maximum assistance, +2  Trials/Comments 1: sit > stand with RW, max assist x 2 for forward weight shift shift and balance, pt pulling up on braced RW with RLE  Transfers 2  Transfer From 2: Stand to  Transfer to 2: Sit  Technique 2: Stand to sit  Transfer Device 2: Walker  Trials/Comments 2: stand > sit with max assist x 2 for eccentric control onto bed    Stairs  Stairs: No    Outcome Measures:  [facility] Basic Mobility  Turning from your back to your side while in a flat bed without using bedrails: Total  Moving from lying on your back to sitting on the side of a flat bed without using bedrails: Total  Moving to and from bed to chair (including a wheelchair): Total  Standing up from a chair using your arms (e.g. wheelchair or bedside chair): Total  To walk in hospital room: Total  Climbing 3-5 steps with railing: Total  Basic Mobility - Total Score: 6    Encounter Problems       Encounter Problems (Active)       Balance       STG - Maintains static standing balance with upper extremity support and min assist x 1. (Progressing)       Start:  11/15/23    Expected End:  11/29/23            STG - Maintains static sitting balance without upper extremity support with independence. (Progressing)       Start:  11/15/23    Expected End:  11/29/23               Mobility       STG - Patient will ambulate 25' with  rolling walker and min assist x 1. (Progressing)       Start:  11/15/23    Expected End:  11/29/23               Pain - Adult          Safety       LTG - Patient will demonstrate safety requirements appropriate to situation/environment (Progressing)       Start:  11/15/23    Expected End:  11/29/23                     Transfers       STG - Patient to transfer to and from sit to supine with modified independence. (Progressing)       Start:  11/15/23    Expected End:  11/29/23            STG - Patient will transfer sit to and from stand with min assist x 1. (Progressing)       Start:  11/15/23    Expected End:  11/29/23                   Education Documentation  Home Exercise Program, taught by Luz Maria Schmidt, PT at 11/15/2023 11:56 AM.  Learner: Patient  Readiness: Acceptance  Method: Explanation, Demonstration  Response: Needs Reinforcement    Mobility Training, taught by Luz Maria Schmidt, PT at 11/15/2023 11:56 AM.  Learner: Patient  Readiness: Acceptance  Method: Explanation, Demonstration  Response: Needs Reinforcement    Education Comments  No comments found.

## 2023-11-15 NOTE — NURSING NOTE
During bed side report, patient BP resulted at 76/51. Iteam nurse was called and Dr. Garduno paged. New orders placed, Dr. Garduno and iteam nurse came to bedside to evaluate patient. After interventions, BP up to 114/88. Passed patient on to day shift nurse.

## 2023-11-15 NOTE — PROGRESS NOTES
Occupational Therapy    Evaluation/Treatment    Patient Name: Angela Ayala  MRN: [id number]  : 1929  Today's Date: 11/15/23  Time Calculation  Start Time: 815  Stop Time: 845  Time Calculation (min): 30 min       Assessment:  Prognosis: Good  Barriers to Discharge: None  Evaluation/Treatment Tolerance: Patient tolerated treatment well  End of Session Communication: Bedside nurse  End of Session Patient Position: Up in chair    Plan:  OT Frequency: 4 times per week  OT Discharge Recommendations: Moderate intensity level of continued care  OT - OK to Discharge:  (to recommended level of care)       Subjective   Current Problem:  1. Acute on chronic diastolic congestive heart failure (CMS/HCC)        2. Bilateral lower extremity edema  Lower extremity venous duplex bilateral    Lower extremity venous duplex bilateral      3. Peripheral vascular disease (CMS/HCC)  Vascular US ankle brachial index (CLARISSA) without exercise    Vascular US ankle brachial index (CLARISSA) without exercise        General:   OT Received On: 11/15/23  General  Reason for Referral: decreased functional status  Referred By: Arlin Garduno DO  Past Medical History Relevant to Rehab: Acute on Chronic diastolic heart failure, Afib, fall  Family/Caregiver Present: Yes  Caregiver Feedback: Caregiver (son) provided some information regarding prior level of function  Prior to Session Communication: Bedside nurse  Patient Position Received: Bed, 3 rail up  General Comment: 89 year old WF admitted from home s/p with c/o knee pain and difficulty walking  Precautions:  Hearing/Visual Limitations: [place] has bilateral hearing aides, glasses for reading  Medical Precautions: Fall precautions  Vital Signs:  Heart Rate: 89  Heart Rate Source: Monitor  Resp: 26  BP: 114/88  Patient Position: Lying  Pain:  Pain Assessment  Pain Assessment: 0-10  Pain Score: 0 - No pain    Objective   Cognition:  Overall Cognitive Status: Within Functional Limits      Home  Living:  Type of Home: House  Lives With: Alone  Home Layout: One level  Home Access: Stairs to enter with rails  Entrance Stairs-Rails: Right  Bathroom Shower/Tub: Tub/shower unit  Bathroom Toilet: Standard  Prior Function:  Level of [place]: Independent with ADLs and functional transfers  Vocational: Retired  Hand Dominance: Right  Prior Function Comments: Caregiver reports that his mother sponge bathes only.  IADL History:  Homemaking Responsibilities: No  ADL:  Eating Assistance: Independent  Grooming Assistance: Minimal  Bathing Assistance: Moderate  UE Dressing Assistance: Moderate  LE Dressing Assistance: Maximal  Toileting Assistance with Device: Maximal  Activities of Daily Living: LE Dressing  LE Dressing: Yes  Sock Level of Assistance: Maximum assistance  LE Dressing Where Assessed: Edge of bed  LE Dressing Comments: inability to access LB due would benefit from AE  Activity Tolerance:  Endurance: Decreased tolerance for upright activites  Functional Standing Tolerance:     Bed Mobility/Transfers: Bed Mobility  Bed Mobility: Yes  Bed Mobility 1  Bed Mobility 1: Supine to sitting  Level of Assistance 1: Maximum assistance    Transfers  Transfer: Yes  Transfer 1  Transfer From 1: Bed to  Transfer to 1: Chair with arms  Technique 1: Sit to stand  Transfer Level of Assistance 1: Moderate assistance    Sensation:  Light Touch: No apparent deficits  Strength:  Strength Comments: left shoulder 2+/5, right shoulder 3/5, bilateral elbows/hands 3/5    Coordination:  Movements are Fluid and Coordinated: Yes   Hand Function:  Hand Function  Gross Grasp: Functional  Coordination:  (impaired fmc)  Extremities: BLE weakness, and impaired coordination    Outcome Measures: [facility] Daily Activity  Putting on and taking off regular lower body clothing: A lot  Bathing (including washing, rinsing, drying): A lot  Putting on and taking off regular upper body clothing: A lot  Toileting, which includes using toilet, bedpan or  urinal: Total  Taking care of personal grooming such as brushing teeth: A little  Eating Meals: A little  Daily Activity - Total Score: 13    Goals:         Problem: Bathing  Goal: STG - Patient will batheUB/LB with minimal assist and AE  Outcome: Progressing     Problem: Dressings Lower Extremities  Goal: LTG - Patient will dress lower body with minimal assist and AE  Outcome: Progressing     Problem: Dressing Upper Extremities  Goal: LTG - Patient will complete upper body dressing independent with set up  Outcome: Progressing     Problem: Grooming  Goal: LTG - Patient will complete daily grooming tasks independent with set up  Outcome: Progressing     Problem: Mobility  Goal: perform functional mobility to and from the bathroom with 2ww and minimal assist.   Outcome: Progressing     Problem: Toileting  Goal: LTG - Patient will demonstrate use of appropriate intervention for safe toileting with minimal assist and 2ww  Outcome: Progressing     Problem: Transfers  Goal: LTG - Patient will  perform all functional transfers with close supervision and 2ww  Outcome: Progressing     Problem: Therapeutic Exercise  Goal: Perform BUE exercise with close supervision  Outcome: Progressing

## 2023-11-15 NOTE — CONSULTS
"Inpatient Diabetes Education Consult    Reason for Visit:  Angela Ayala is a 89 y.o. female who presents to ED with C/O \"left knee pain s/p unwitnessed fall on Sunday.  Per son, Pt laid on maxim for 12 hours before he fund her. Also found to be SOB with exertion & have BLE edema.  Possible UTI - culture pending.     Consulting Service/Provider: PADMINI Collier    Visit Type: Initial visit    Visit Modality: In-person    Patient History and Assessment:  New diagnosis: Type 2  Previous diagnosis:  No  Patient known to Diabetes Education department: No  Treatment prior to hospital admission: N/A  Complications: none   PTA Medications:    Current Outpatient Medications   Medication Instructions    amiodarone (PACERONE) 200 mg, oral, Daily    aspirin 81 mg EC tablet 1 tablet, oral, Daily    atorvastatin (Lipitor) 40 mg tablet 1 tablet, oral, Daily    bimatoprost (Lumigan) 0.01 % ophthalmic solution 1 drop, Both Eyes, Nightly    calcium carbonate 600 mg calcium (1,500 mg) tablet 1 tablet, oral, Daily    dorzolamide-timoloL (Cosopt) 22.3-6.8 mg/mL ophthalmic solution 1 drop, ophthalmic (eye), Daily RT    Eliquis 5 mg, oral, 2 times daily    ferrous sulfate 325 (65 Fe) MG tablet 1 tablet, oral, Daily    furosemide (LASIX) 40 mg, oral, 2 times daily    losartan (COZAAR) 25 mg, oral, Daily    metoprolol succinate XL (TOPROL-XL) 100 mg, oral, Daily RT    omeprazole (PriLOSEC) 20 mg DR capsule 1 capsule, oral, Daily       Glucose   Date/Time Value Ref Range Status   11/15/2023 06:57  (H) 65 - 99 mg/dL Final   11/14/2023 09:51  (H) 65 - 99 mg/dL Final   11/14/2023 12:46  (H) 65 - 99 mg/dL Final   10/18/2023 09:38  (H) 65 - 99 mg/dL Final   10/03/2023 04:11  (H) 74 - 99 mg/dL Final   09/29/2023 04:29  (H) 65 - 99 MG/DL Final   09/28/2023 05:20  (H) 65 - 99 MG/DL Final   09/27/2023 04:22  (H) 65 - 99 MG/DL Final   09/26/2023 06:03  (H) 65 - 99 MG/DL Final   09/25/2023 02:08  (H) " "65 - 99 MG/DL Final   04/24/2023 02:39 PM 89 74 - 99 mg/dL Final   10/17/2022 01:50  (H) 74 - 99 mg/dL Final   04/18/2022 02:10  (H) 74 - 99 mg/dL Final   07/06/2021 12:08  (H) 74 - 99 mg/dL Final   11/17/2020 12:06  (H) 74 - 99 mg/dL Final     No results found for: \"CPEPTIDE\"  Hemoglobin A1C   Date Value Ref Range Status   11/15/2023 6.8 (H) See below % Final   07/06/2021 6.0 % Final     Comment:          Diagnosis of Diabetes-Adults   Non-Diabetic: < or = 5.6%   Increased risk for developing diabetes: 5.7-6.4%   Diagnostic of diabetes: > or = 6.5%  .       Monitoring of Diabetes                Age (y)     Therapeutic Goal (%)   Adults:          >18           <7.0   Pediatrics:    13-18           <7.5                   7-12           <8.0                   0- 6            7.5-8.5   American Diabetes Association. Diabetes Care 33(S1), Jan 2010.     10/15/2020 6.1 % Final     Comment:          Diagnosis of Diabetes-Adults   Non-Diabetic: < or = 5.6%   Increased risk for developing diabetes: 5.7-6.4%   Diagnostic of diabetes: > or = 6.5%  .       Monitoring of Diabetes                Age (y)     Therapeutic Goal (%)   Adults:          >18           <7.0   Pediatrics:    13-18           <7.5                   7-12           <8.0                   0- 6            7.5-8.5   American Diabetes Association. Diabetes Care 33(S1), Jan 2010.     12/09/2019 5.9 % Final     Comment:          Diagnosis of Diabetes-Adults   Non-Diabetic: < or = 5.6%   Increased risk for developing diabetes: 5.7-6.4%   Diagnostic of diabetes: > or = 6.5%  .       Monitoring of Diabetes                Age (y)     Therapeutic Goal (%)   Adults:          >18           <7.0   Pediatrics:    13-18           <7.5                   7-12           <8.0                   0- 6            7.5-8.5   American Diabetes Association. Diabetes Care 33(S1), Jan 2010.     08/17/2019 6.1 % Final     Comment:          Diagnosis of " "Diabetes-Adults   Non-Diabetic: < or = 5.6%   Increased risk for developing diabetes: 5.7-6.4%   Diagnostic of diabetes: > or = 6.5%  .       Monitoring of Diabetes                Age (y)     Therapeutic Goal (%)   Adults:          >18           <7.0   Pediatrics:    13-18           <7.5                   7-12           <8.0                   0- 6            7.5-8.5   American Diabetes Association. Diabetes Care 33(S1), Jan 2010.         Patient Learning/Readiness Assessment:   Pt is in bed, I spoke to son, Gus who lives close to patient & checks in on her.  Patient lives alone.  H/o CHF-S, afib,-Eliquis, HPL, CAD, GERD, cataracts, CKD, glaucoma, HTN, MI, osteoporosis.     BS in ED was 190 mg/dl.  Per Gus, After finding his mother she was very thirsty and drank 4 large glasses of orange juice.  New onset T2DDM HbA1c 6.8% dated 11/15/23.  While inpatient getting Humalog 0-5 units TID, Insulin Regular 10 units X 1.   No antihyperglycemic agents prior to admission.   diabetes folder &handouts given to son.  Instructed on what is T2DM, Target BS ranges, Her HbA1c result of 6.8% dated 11/15/23.  Goal of less than 7%, s/s of hyper-hypoglycemia and actions to take. Eye/foot care & sickday guidelines.  Discussed the possibility of her having  a UTI which could also cause hyperglycemia.  Gus states, \"He doesn't think she needs to be treated for her diabetes at the age of 89.   BS today at 12:11 was 96 mg/dl.    Will continue to monitor POCT                "

## 2023-11-15 NOTE — CARE PLAN
Problem: Bathing  Goal: STG - Patient will batheUB/LB with minimal assist and AE  Outcome: Progressing     Problem: Dressings Lower Extremities  Goal: LTG - Patient will dress lower body with minimal assist and AE  Outcome: Progressing     Problem: Dressing Upper Extremities  Goal: LTG - Patient will complete upper body dressing independent with set up  Outcome: Progressing     Problem: Grooming  Goal: LTG - Patient will complete daily grooming tasks independent with set up  Outcome: Progressing     Problem: Mobility  Goal: perform functional mobility to and from the bathroom with 2ww and minimal assist.   Outcome: Progressing     Problem: Toileting  Goal: LTG - Patient will demonstrate use of appropriate intervention for safe toileting with minimal assist and 2ww  Outcome: Progressing     Problem: Transfers  Goal: LTG - Patient will  perform all functional transfers with close supervision and 2ww  Outcome: Progressing     Problem: Therapeutic Exercise  Goal: Perform BUE exercise with close supervision  Outcome: Progressing

## 2023-11-16 LAB
ALBUMIN SERPL-MCNC: 2.3 G/DL (ref 3.5–5)
ANION GAP SERPL CALC-SCNC: 11 MMOL/L
BUN SERPL-MCNC: 60 MG/DL (ref 8–25)
CALCIUM SERPL-MCNC: 8.7 MG/DL (ref 8.5–10.4)
CHLORIDE SERPL-SCNC: 99 MMOL/L (ref 97–107)
CO2 SERPL-SCNC: 26 MMOL/L (ref 24–31)
CREAT SERPL-MCNC: 1.7 MG/DL (ref 0.4–1.6)
ERYTHROCYTE [DISTWIDTH] IN BLOOD BY AUTOMATED COUNT: 19.9 % (ref 11.5–14.5)
GFR SERPL CREATININE-BSD FRML MDRD: 28 ML/MIN/1.73M*2
GLUCOSE BLD MANUAL STRIP-MCNC: 123 MG/DL (ref 74–99)
GLUCOSE BLD MANUAL STRIP-MCNC: 95 MG/DL (ref 74–99)
GLUCOSE SERPL-MCNC: 109 MG/DL (ref 65–99)
HCT VFR BLD AUTO: 45.8 % (ref 36–46)
HGB BLD-MCNC: 14.5 G/DL (ref 12–16)
MCH RBC QN AUTO: 29.3 PG (ref 26–34)
MCHC RBC AUTO-ENTMCNC: 31.7 G/DL (ref 32–36)
MCV RBC AUTO: 93 FL (ref 80–100)
NRBC BLD-RTO: 0 /100 WBCS (ref 0–0)
PHOSPHATE SERPL-MCNC: 4.5 MG/DL (ref 2.5–4.5)
PLATELET # BLD AUTO: 213 X10*3/UL (ref 150–450)
POTASSIUM SERPL-SCNC: 4.7 MMOL/L (ref 3.4–5.1)
RBC # BLD AUTO: 4.95 X10*6/UL (ref 4–5.2)
SODIUM SERPL-SCNC: 136 MMOL/L (ref 133–145)
WBC # BLD AUTO: 9.5 X10*3/UL (ref 4.4–11.3)

## 2023-11-16 PROCEDURE — 80069 RENAL FUNCTION PANEL: CPT | Performed by: HOSPITALIST

## 2023-11-16 PROCEDURE — 2500000004 HC RX 250 GENERAL PHARMACY W/ HCPCS (ALT 636 FOR OP/ED): Performed by: INTERNAL MEDICINE

## 2023-11-16 PROCEDURE — 99232 SBSQ HOSP IP/OBS MODERATE 35: CPT | Performed by: NURSE PRACTITIONER

## 2023-11-16 PROCEDURE — 99232 SBSQ HOSP IP/OBS MODERATE 35: CPT | Performed by: INTERNAL MEDICINE

## 2023-11-16 PROCEDURE — 2060000001 HC INTERMEDIATE ICU ROOM DAILY

## 2023-11-16 PROCEDURE — 97530 THERAPEUTIC ACTIVITIES: CPT | Mod: GP

## 2023-11-16 PROCEDURE — 2500000004 HC RX 250 GENERAL PHARMACY W/ HCPCS (ALT 636 FOR OP/ED): Performed by: NURSE PRACTITIONER

## 2023-11-16 PROCEDURE — 2500000001 HC RX 250 WO HCPCS SELF ADMINISTERED DRUGS (ALT 637 FOR MEDICARE OP): Performed by: INTERNAL MEDICINE

## 2023-11-16 PROCEDURE — 36415 COLL VENOUS BLD VENIPUNCTURE: CPT | Performed by: NURSE PRACTITIONER

## 2023-11-16 PROCEDURE — 97530 THERAPEUTIC ACTIVITIES: CPT | Mod: GO,CO

## 2023-11-16 PROCEDURE — 2500000002 HC RX 250 W HCPCS SELF ADMINISTERED DRUGS (ALT 637 FOR MEDICARE OP, ALT 636 FOR OP/ED): Performed by: NURSE PRACTITIONER

## 2023-11-16 PROCEDURE — 85027 COMPLETE CBC AUTOMATED: CPT | Performed by: NURSE PRACTITIONER

## 2023-11-16 PROCEDURE — 36415 COLL VENOUS BLD VENIPUNCTURE: CPT | Performed by: HOSPITALIST

## 2023-11-16 PROCEDURE — 2500000001 HC RX 250 WO HCPCS SELF ADMINISTERED DRUGS (ALT 637 FOR MEDICARE OP): Performed by: NURSE PRACTITIONER

## 2023-11-16 PROCEDURE — 99223 1ST HOSP IP/OBS HIGH 75: CPT | Performed by: NURSE PRACTITIONER

## 2023-11-16 PROCEDURE — 82947 ASSAY GLUCOSE BLOOD QUANT: CPT

## 2023-11-16 RX ORDER — DIGOXIN 125 MCG
125 TABLET ORAL EVERY OTHER DAY
Status: DISCONTINUED | OUTPATIENT
Start: 2023-11-16 | End: 2023-11-18 | Stop reason: HOSPADM

## 2023-11-16 RX ORDER — AMMONIUM LACTATE 12 G/100G
1 LOTION TOPICAL 2 TIMES DAILY
Status: DISCONTINUED | OUTPATIENT
Start: 2023-11-16 | End: 2023-11-18 | Stop reason: HOSPADM

## 2023-11-16 RX ADMIN — DIGOXIN 125 MCG: 125 TABLET ORAL at 09:40

## 2023-11-16 RX ADMIN — FERROUS SULFATE TAB 325 MG (65 MG ELEMENTAL FE) 325 MG: 325 (65 FE) TAB at 08:49

## 2023-11-16 RX ADMIN — ASPIRIN 81 MG: 81 TABLET, COATED ORAL at 08:50

## 2023-11-16 RX ADMIN — APIXABAN 2.5 MG: 2.5 TABLET, FILM COATED ORAL at 08:50

## 2023-11-16 RX ADMIN — APIXABAN 2.5 MG: 2.5 TABLET, FILM COATED ORAL at 21:08

## 2023-11-16 RX ADMIN — CEFTRIAXONE SODIUM 1 G: 1 INJECTION, SOLUTION INTRAVENOUS at 05:41

## 2023-11-16 RX ADMIN — MIDODRINE HYDROCHLORIDE 5 MG: 5 TABLET ORAL at 00:08

## 2023-11-16 RX ADMIN — ATORVASTATIN CALCIUM 40 MG: 40 TABLET, FILM COATED ORAL at 21:08

## 2023-11-16 RX ADMIN — LATANOPROST 1 DROP: 50 SOLUTION OPHTHALMIC at 21:08

## 2023-11-16 RX ADMIN — MIDODRINE HYDROCHLORIDE 5 MG: 5 TABLET ORAL at 16:42

## 2023-11-16 RX ADMIN — DORZOLAMIDE HYDROCHLORIDE AND TIMOLOL MALEATE 1 DROP: 20; 5 SOLUTION/ DROPS OPHTHALMIC at 21:18

## 2023-11-16 RX ADMIN — SODIUM CHLORIDE 1000 ML: 900 INJECTION, SOLUTION INTRAVENOUS at 00:03

## 2023-11-16 RX ADMIN — MIDODRINE HYDROCHLORIDE 5 MG: 5 TABLET ORAL at 08:50

## 2023-11-16 RX ADMIN — PANTOPRAZOLE SODIUM 40 MG: 40 TABLET, DELAYED RELEASE ORAL at 08:50

## 2023-11-16 RX ADMIN — AMIODARONE HYDROCHLORIDE 200 MG: 200 TABLET ORAL at 08:49

## 2023-11-16 ASSESSMENT — ACTIVITIES OF DAILY LIVING (ADL): LACK_OF_TRANSPORTATION: NO

## 2023-11-16 ASSESSMENT — COGNITIVE AND FUNCTIONAL STATUS - GENERAL
MOVING FROM LYING ON BACK TO SITTING ON SIDE OF FLAT BED WITH BEDRAILS: TOTAL
CLIMB 3 TO 5 STEPS WITH RAILING: TOTAL
DRESSING REGULAR UPPER BODY CLOTHING: A LOT
STANDING UP FROM CHAIR USING ARMS: TOTAL
DRESSING REGULAR LOWER BODY CLOTHING: A LOT
EATING MEALS: A LITTLE
TOILETING: A LOT
PERSONAL GROOMING: A LITTLE
DAILY ACTIVITIY SCORE: 11
MOVING TO AND FROM BED TO CHAIR: TOTAL
DRESSING REGULAR LOWER BODY CLOTHING: A LOT
DRESSING REGULAR UPPER BODY CLOTHING: A LOT
TOILETING: TOTAL
HELP NEEDED FOR BATHING: A LOT
PERSONAL GROOMING: A LOT
TURNING FROM BACK TO SIDE WHILE IN FLAT BAD: A LOT
WALKING IN HOSPITAL ROOM: TOTAL
MOBILITY SCORE: 7
EATING MEALS: A LITTLE
HELP NEEDED FOR BATHING: TOTAL
DAILY ACTIVITIY SCORE: 14

## 2023-11-16 ASSESSMENT — COLUMBIA-SUICIDE SEVERITY RATING SCALE - C-SSRS
1. IN THE PAST MONTH, HAVE YOU WISHED YOU WERE DEAD OR WISHED YOU COULD GO TO SLEEP AND NOT WAKE UP?: NO
6. HAVE YOU EVER DONE ANYTHING, STARTED TO DO ANYTHING, OR PREPARED TO DO ANYTHING TO END YOUR LIFE?: NO
2. HAVE YOU ACTUALLY HAD ANY THOUGHTS OF KILLING YOURSELF?: NO

## 2023-11-16 ASSESSMENT — PAIN SCALES - GENERAL
PAINLEVEL_OUTOF10: 0 - NO PAIN

## 2023-11-16 ASSESSMENT — PAIN - FUNCTIONAL ASSESSMENT
PAIN_FUNCTIONAL_ASSESSMENT: 0-10

## 2023-11-16 NOTE — PROGRESS NOTES
Spiritual Care Visit    Clinical Encounter Type  Visited With: Patient  Routine Visit: Introduction  Continue Visiting: Yes         Values/Beliefs  Spiritual Requests During Hospitalization: Anointed today    Sacramental Encounters  Communion: Ask the patient  Communion Given Indicator: Yes  Sacrament of Sick-Anointing: Anointed     Ga Lopez

## 2023-11-16 NOTE — CONSULTS
Nutrition Assessment Note  Patient admitted after fall at home. EF 25-30% Pt states good po intake, unsure about any changes to weight. Offered low sodium diet education and patient declines.    Reason for Hospital Admission:  Angela Ayala is a 89 y.o. female who is admitted for left knee pain.    Nutrition Assessment:  Reason for Assessment  Reason for Assessment: Admission nursing screening, Dietitian discretion (MST 2, CHF Day2)     has a past medical history of Age-related nuclear cataract, right eye (10/09/2019), Age-related nuclear cataract, right eye (10/09/2019), Atrial fibrillation (CMS/HCC), CHF (congestive heart failure) (CMS/HCC), Chronic kidney disease, Dyslipidemia, GERD (gastroesophageal reflux disease), Glaucoma, Heart disease, Hypertension, Myocardial infarction (CMS/HCC), Osteoporosis, Other conditions influencing health status, Other conditions influencing health status, Other conditions influencing health status, Personal history of other diseases of the nervous system and sense organs (08/12/2019), Personal history of other diseases of the nervous system and sense organs (10/09/2019), Personal history of transient ischemic attack (TIA), and cerebral infarction without residual deficits (08/12/2019), Stroke (CMS/HCC), and Unspecified cataract.     Allergies   Allergen Reactions    Penicillins Hives        Lab Results   Component Value Date    WBC 9.5 11/16/2023    HGB 14.5 11/16/2023    HCT 45.8 11/16/2023     11/16/2023    CHOL 97 (L) 09/26/2023    TRIG 70 09/26/2023    HDL 38 (L) 09/26/2023    ALT 31 11/14/2023    AST 20 11/14/2023     11/16/2023    K 4.7 11/16/2023    CL 99 11/16/2023    CREATININE 1.70 (H) 11/16/2023    BUN 60 (H) 11/16/2023    CO2 26 11/16/2023    TSH 1.96 04/24/2023    INR 1.1 09/25/2023    HGBA1C 6.8 (H) 11/15/2023       Current Facility-Administered Medications:     acetaminophen (Tylenol) tablet 650 mg, 650 mg, oral, q6h PRN **OR** acetaminophen (Tylenol)  oral liquid 650 mg, 650 mg, nasogastric tube, q6h PRN **OR** acetaminophen (Tylenol) suppository 650 mg, 650 mg, rectal, q6h PRN, VIJAYA Luis-CNP    amiodarone (Pacerone) tablet 200 mg, 200 mg, oral, Daily, VIJAYA Luis-CNP, 200 mg at 11/16/23 0849    ammonium lactate (Lac-Hydrin) 12 % lotion 1 Application, 1 Application, Topical, BID, VIJAYA Luis-CNP    apixaban (Eliquis) tablet 2.5 mg, 2.5 mg, oral, BID, VIJAYA Luis-CNP, 2.5 mg at 11/16/23 0850    aspirin EC tablet 81 mg, 81 mg, oral, Daily, VIJAYA Luis-CNP, 81 mg at 11/16/23 0850    atorvastatin (Lipitor) tablet 40 mg, 40 mg, oral, Nightly, VIJAYA Luis-CNP, 40 mg at 11/15/23 2148    bisacodyl (Dulcolax) EC tablet 10 mg, 10 mg, oral, Daily PRN, VIJAYA Luis-CNP    cefTRIAXone (Rocephin) IVPB 1 g, 1 g, intravenous, q24h, Dominga Golden MD, Stopped at 11/16/23 0611    dextrose 10 % in water (D10W) infusion, 0.3 g/kg/hr, intravenous, Once PRN, VIJAYA Luis-CNP    dextrose 50 % injection 25 g, 25 g, intravenous, q15 min PRN, VIJAYA Luis-CNP    digoxin (Lanoxin) tablet 125 mcg, 125 mcg, oral, Every other day, Kwame Johnson MD, 125 mcg at 11/16/23 0940    dorzolamide-timoloL (Cosopt) 22.3-6.8 mg/mL ophthalmic solution 1 drop, 1 drop, Both Eyes, Nightly, VIJAYA Luis-CNP, 1 drop at 11/15/23 2149    ferrous sulfate (325 mg ferrous sulfate) tablet 325 mg, 1 tablet, oral, Daily with breakfast, BETH Luis, 325 mg at 11/16/23 0849    glucagon (Glucagen) injection 1 mg, 1 mg, intramuscular, q15 min PRN, BETH Luis    insulin lispro (HumaLOG) injection 0-5 Units, 0-5 Units, subcutaneous, TID with meals, BETH Luis    latanoprost (Xalatan) 0.005 % ophthalmic solution 1 drop, 1 drop, Both Eyes, Nightly, BETH Luis, 1 drop at 11/15/23 6332    midodrine (Proamatine) tablet 5 mg, 5 mg, oral, q8h, Dominga  "MD Chantel, 5 mg at 11/16/23 0850    oxygen (O2) therapy, , inhalation, Continuous PRN - O2/gases, BETH Luis, 2 L/min at 11/15/23 2000    pantoprazole (ProtoNix) EC tablet 40 mg, 40 mg, oral, Daily, BETH Lius, 40 mg at 11/16/23 0850  Propofol @  ml/hr provides:  kcals/day    Dietary Orders (From admission, onward)       Start     Ordered    11/14/23 1542  Adult diet Cardiac; 70 gm fat; 2 - 3 grams Sodium  Diet effective now        Question Answer Comment   Diet type Cardiac    Fat restriction: 70 gm fat    Sodium restriction: 2 - 3 grams Sodium        11/14/23 1541                   Nutrition Support Intake provides:      History:  Food and Nutrient History  Energy Intake: Good > 75 %    Anthropometrics:  Ht: 160 cm (5' 3\"), Wt: 89 kg (196 lb 3.4 oz), BMI: 34.77     IBW/kg (Dietitian Calculated): 52.27 kg     Adjusted Body Weight (kg): 61.36 kg    Nutrition Prescription  Estimated Energy Needs  Total Energy Estimated Needs (kCal): 1525 kCal  Total Estimated Energy Need per Day (kCal/kg): 25 kCal/kg  Method for Estimating Needs: Adj Wt    Estimated Protein Needs  Total Protein Estimated Needs (g): 73 g  Total Protein Estimated Needs (g/kg): 1.2 g/kg  Method for Estimating Needs: Adj Wt    Estimated Fluid Needs  Total Fluid Estimated Needs (mL): 1525 mL  Method for Estimating Needs: 1 mL/kcal    Nutrition Focused Physical Findings:  Subcutaneous Fat Loss  Orbital Fat Pads: Well nourshed (slightly bulging fat pads)  Buccal Fat Pads: Well nourished (full, rounded cheeks)  Triceps: Well nourished (ample fat tissue)  Ribs: Defer    Muscle Wasting  Temporalis: Well nourished (well-defined muscle)  Pectoralis (Clavicular Region): Well nourished (clavicle not visible)  Deltoid/Trapezius: Well nourished (rounded appearance at arm, shoulder, neck)  Interosseous: Well nourished (muscle bulges)  Trapezius/Infraspinatus/Supraspinatus (Scapular Region): Well nourished (bones not prominent, " muscle taut)  Quadriceps: Defer  Gastrocnemius: Defer    Nutrition Diagnosis:  Malnutrition Diagnosis  Patient has Malnutrition Diagnosis: No    Patient has Nutrition Diagnosis: Yes  Nutrition Diagnosis 1: Increased nutrient needs  Diagnosis Status (1): New  Related to (1): Increased demand for nutrient  As Evidenced by (1): Conditions associated with diagnosis     Nutrition Interventions/Recommendations:     Food and/or Nutrient Delivery Interventions  Interventions: Meals and snacks    Meals and Snacks: Fat-modified diet, Mineral-modified diet  Goal: Provide as ordered     Education Documentation  No documentation found.      Nutrition Monitoring/Evaluation:  Food and Nutrient Related History  Energy Intake: Estimated energy intake  Criteria: Patient will consume >75% of meals    RD Recommendations:     Follow Up  Last Date of Nutrition Visit: 11/16/23  Nutrition Follow-Up Needed?: 7-10 days  Follow up Comment: 11/22/23

## 2023-11-16 NOTE — PROGRESS NOTES
"Angela Ayala is a 89 y.o. female on day 2 of admission presenting with Acute on chronic diastolic congestive heart failure (CMS/HCC).    Subjective   Patient seen and examined secondary to difficulty palpating pulses and discoloration of the toes on the right foot.  Patient was given some fluid overnight and denies any pain in the legs or feet currently.       Objective     Physical Exam    General: Pt is alert and oriented x 3. Pleasant, conversive  HEENT: Head is atraumatic, normocephalic.   Cardiac: Normal S1-S2.  Regular rate and rhythm.  No murmurs.  Respiratory: Lungs clear to auscultation.  No adventitious sounds.  Abdomen: Soft, nondistended, nontender.  Bowel sounds x4 quadrants.  Pulse exam: Palpable brachial and radial pulses bilaterall.  Nonpalpable pedal pulses bilaterally.  Extremities: 2+ pitting edema bilateral lower extremities.  Toes are pink, no evidence of duskiness today.  Right fingers are slightly dusky.  Weakly palpable radial pulse.  Neuro: Moves all extremities spontaneously.  No focal deficits.  Psych: Appropriate affect.  Answers questions appropriately.      Last Recorded Vitals  Blood pressure 93/79, pulse 83, temperature 34.1 °C (93.4 °F), temperature source Oral, resp. rate 16, height 1.6 m (5' 3\"), weight 89 kg (196 lb 3.4 oz), SpO2 98 %.  Intake/Output last 3 Shifts:  I/O last 3 completed shifts:  In: 1900 (21.3 mL/kg) [P.O.:600; I.V.:200 (2.2 mL/kg); IV Piggyback:1100]  Out: 840 (9.4 mL/kg) [Urine:840 (0.3 mL/kg/hr)]  Weight: 89 kg     Relevant Results  Current Outpatient Medications   Medication Instructions    amiodarone (PACERONE) 200 mg, oral, Daily    aspirin 81 mg EC tablet 1 tablet, oral, Daily    atorvastatin (Lipitor) 40 mg tablet 1 tablet, oral, Daily    bimatoprost (Lumigan) 0.01 % ophthalmic solution 1 drop, Both Eyes, Nightly    calcium carbonate 600 mg calcium (1,500 mg) tablet 1 tablet, oral, Daily    dorzolamide-timoloL (Cosopt) 22.3-6.8 mg/mL ophthalmic solution " 1 drop, ophthalmic (eye), Daily RT    Eliquis 5 mg, oral, 2 times daily    ferrous sulfate 325 (65 Fe) MG tablet 1 tablet, oral, Daily    furosemide (LASIX) 40 mg, oral, 2 times daily    losartan (COZAAR) 25 mg, oral, Daily    metoprolol succinate XL (TOPROL-XL) 100 mg, oral, Daily RT    omeprazole (PriLOSEC) 20 mg DR capsule 1 capsule, oral, Daily      Results for orders placed or performed during the hospital encounter of 11/14/23 (from the past 24 hour(s))   Renal Function Panel   Result Value Ref Range    Glucose 109 (H) 65 - 99 mg/dL    Sodium 136 133 - 145 mmol/L    Potassium 4.7 3.4 - 5.1 mmol/L    Chloride 99 97 - 107 mmol/L    Bicarbonate 26 24 - 31 mmol/L    Urea Nitrogen 60 (H) 8 - 25 mg/dL    Creatinine 1.70 (H) 0.40 - 1.60 mg/dL    eGFR 28 (L) >60 mL/min/1.73m*2    Calcium 8.7 8.5 - 10.4 mg/dL    Phosphorus 4.5 2.5 - 4.5 mg/dL    Albumin 2.3 (L) 3.5 - 5.0 g/dL    Anion Gap 11 <=19 mmol/L   CBC   Result Value Ref Range    WBC 9.5 4.4 - 11.3 x10*3/uL    nRBC 0.0 0.0 - 0.0 /100 WBCs    RBC 4.95 4.00 - 5.20 x10*6/uL    Hemoglobin 14.5 12.0 - 16.0 g/dL    Hematocrit 45.8 36.0 - 46.0 %    MCV 93 80 - 100 fL    MCH 29.3 26.0 - 34.0 pg    MCHC 31.7 (L) 32.0 - 36.0 g/dL    RDW 19.9 (H) 11.5 - 14.5 %    Platelets 213 150 - 450 x10*3/uL   POCT GLUCOSE   Result Value Ref Range    POCT Glucose 95 74 - 99 mg/dL   POCT GLUCOSE   Result Value Ref Range    POCT Glucose 123 (H) 74 - 99 mg/dL                           Assessment/Plan   Peripheral vascular disease   cyanosis of the toes right foot  Atrial fibrillation on Eliquis     -Patient has nonpalpable pedal pulses.   -Toes are much less mottled than yesterday.  Wonder if this is due to low perfusion state/hypotension?  -Have some level of arterial disease, however, it is asymptomatic no ischemic rest pain or open wounds or sores.  -No further need for work-up other than arterial study.             I spent 35 minutes in the professional and overall care of this  patient.      Mikal Alexander, APRN-CNP

## 2023-11-16 NOTE — NURSING NOTE
PT?OT were in to evaluate pt,pt was able to stand beside the bed with max assistance,unable to walk,c/o Rt Le weakness.

## 2023-11-16 NOTE — CARE PLAN
The patient's goals for the shift include vital sign stable    The clinical goals for the shift include Maintain stable bp    Over the shift, the patient did not make progress toward the following goals. Barriers to progression include   Problem: Skin  Goal: Decreased wound size/increased tissue granulation at next dressing change  11/16/2023 1853 by Perry Wiley RN  Outcome: Progressing  11/16/2023 1106 by Perry Wiley RN  Outcome: Progressing     Problem: Skin  Goal: Participates in plan/prevention/treatment measures  11/16/2023 1853 by Perry Wiley RN  Outcome: Progressing  11/16/2023 1106 by Perry Wiley RN  Outcome: Progressing     Problem: Skin  Goal: Prevent/manage excess moisture  11/16/2023 1853 by Perry Wiley RN  Outcome: Progressing  11/16/2023 1106 by Perry Wiley RN  Outcome: Progressing     Problem: Skin  Goal: Prevent/minimize sheer/friction injuries  11/16/2023 1853 by Perry Wiley RN  Outcome: Progressing  11/16/2023 1106 by Perry Wiley RN  Outcome: Progressing   . Recommendations to address these barriers include  Problem: Skin  Goal: Promote/optimize nutrition  11/16/2023 1853 by Perry Wiley RN  Outcome: Progressing  11/16/2023 1106 by Perry Wiley RN  Outcome: Progressing

## 2023-11-16 NOTE — CONSULTS
CONSULT: Nephrology SERVICE    SERVICE DATE: 11/16/2023   SERVICE TIME:  3:45 PM    REASON FOR CONSULT: Hyperkalemia  REQUESTING PHYSICIAN: Dr. Garduno  PRIMARY CARE PHYSICIAN: Linda Prather, OC    ASSESSMENT AND PLAN  89 a woman with numerous medical problems admitted with fluid overload, being found down, hyperkalemia, significant coronary disease.  1.  Hyperkalemia  2.  Fluid overload  3.  Edema  4.  Hypotension  5.  Chronic kidney disease stage IIIb    The hyperkalemia at admission has resolved.  She required IV diuretic therapy to reduce this.  She unfortunately is very fluid overloaded with significant peripheral edema but sustained hypotension.  The creatinine is very close to her baseline of 1.5-1.6 range.  I am not sure were offering her much good with ongoing loop diuretic therapy, particular given her ongoing hypotension.  Give her a diuretic holiday today, check labs tomorrow, see what her blood pressure and creatinine do overnight.  Case discussed with Mikal Alexander, SUSANNA and Gale Magana CNP.  Case also discussed with Dr. Garduno.  Thank you for the consultation.  I will follow her.       SUBJECTIVE  Ms. Ayala is a 89 y.o. woman with a medical history of hypertension, coronary artery disease, CHF, and CKD admitted with fall and weakness, consulted for hyperkalemia and chronic kidney disease management.  This patient does not follow with a nephrologist, but has a baseline serum creatinine 1.5-1.6.  The patient was admitted with a potassium of 6.1.  This was treated medically.  The patient received several rounds of IV diuretic therapy, but the blood pressure is low.  The patient remains with significant peripheral edema, oxygen requirement, generalized deconditioning as well.  She endorses no dyspnea to me, no chest pain, not a great appetite though.  She hates taking outpatient diuretics as she hates the the polyuric effect of them.  She is supposed to be on furosemide 40 mg p.o. twice  daily.    PAST MEDICAL HISTORY:   Past Medical History:   Diagnosis Date    Age-related nuclear cataract, right eye 10/09/2019    Age-related nuclear cataract, right eye    Age-related nuclear cataract, right eye 10/09/2019    Age-related nuclear cataract of right eye    Atrial fibrillation (CMS/HCC)     CHF (congestive heart failure) (CMS/HCC)     Chronic kidney disease     Dyslipidemia     GERD (gastroesophageal reflux disease)     Glaucoma     Heart disease     Hypertension     Myocardial infarction (CMS/HCC)     Osteoporosis     Other conditions influencing health status     Mammogram    Other conditions influencing health status     DEXA Body Composition Study    Other conditions influencing health status     Colonoscopy (Fiberoptic)    Personal history of other diseases of the nervous system and sense organs 08/12/2019    History of hearing loss    Personal history of other diseases of the nervous system and sense organs 10/09/2019    History of cataract    Personal history of transient ischemic attack (TIA), and cerebral infarction without residual deficits 08/12/2019    History of cerebrovascular accident    Stroke (CMS/[facility])     Unspecified cataract     Cataract of left eye     PAST SURGICAL HISTORY:   Past Surgical History:   Procedure Laterality Date    KNEE SURGERY Bilateral     MR HEAD ANGIO WO IV CONTRAST  04/26/2012    MR HEAD ANGIO WO IV CONTRAST [facility]    OTHER SURGICAL HISTORY  08/27/2014    Laser Suite Retina Treatment Consisted Of    OTHER SURGICAL HISTORY  07/19/2013    Reported Hx Of Knee Replacement    OTHER SURGICAL HISTORY  07/19/2013    Remove Cataract, Corneo-Scleral Section Left Eye     FAMILY HISTORY:   Family History   Problem Relation Name Age of Onset    Colon cancer Mother      Stroke Father      Glaucoma Father       SOCIAL HISTORY:   Social History     Tobacco Use    Smoking status: Never    Smokeless tobacco: Never   Substance Use Topics    Drug use: Never      MEDICATIONS:  amiodarone, 200 mg, oral, Daily  ammonium lactate, 1 Application, Topical, BID  apixaban, 2.5 mg, oral, BID  aspirin, 81 mg, oral, Daily  atorvastatin, 40 mg, oral, Nightly  cefTRIAXone, 1 g, intravenous, q24h  digoxin, 125 mcg, oral, Every other day  dorzolamide-timoloL, 1 drop, Both Eyes, Nightly  ferrous sulfate (325 mg ferrous sulfate), 1 tablet, oral, Daily with breakfast  latanoprost, 1 drop, Both Eyes, Nightly  midodrine, 5 mg, oral, q8h  pantoprazole, 40 mg, oral, Daily           PRN medications: acetaminophen **OR** acetaminophen **OR** acetaminophen, bisacodyl, dextrose 10 % in water (D10W), dextrose, glucagon, oxygen   CURRENT ALLERGIES:   Allergies as of 11/14/2023 - Reviewed 11/14/2023   Allergen Reaction Noted    Penicillins Hives 04/24/2023       COMPLETE REVIEW OF SYSTEMS:    Full ROS was negative unless mentioned above    OBJECTIVE  PHYSICAL EXAM  Heart Rate:  []   Temp:  [34.1 °C (93.4 °F)-36.8 °C (98.2 °F)]   Resp:  [12-28]   BP: ()/(45-83)   SpO2:  [93 %-100 %]    Body mass index is 34.76 kg/m².  This is an obese and elderly white woman who appears chronically ill  Confused affect  No obvious skin rashes, pale skin  Hearing intact  Phonation intact  Moist mucosa  Normal S1/normal S2  Lungs are clear to auscultation bilaterally  Abdomen is soft, nondistended, nontender, positive bowel sounds  Tavarez catheter in place, no suprapubic tenderness to palpation  She has 4 extremity edema, lower extremity edema extends up past her knees into her thighs  Moves 4 limbs spontaneously  No obvious joint deformities  No lymphadenopathy    DATA:   Labs:  Results for orders placed or performed during the hospital encounter of 11/14/23 (from the past 96 hour(s))   CBC and Auto Differential   Result Value Ref Range    WBC 5.6 4.4 - 11.3 x10*3/uL    nRBC 0.0 0.0 - 0.0 /100 WBCs    RBC 5.46 (H) 4.00 - 5.20 x10*6/uL    Hemoglobin 16.1 (H) 12.0 - 16.0 g/dL    Hematocrit 51.0 (H) 36.0 -  46.0 %    MCV 93 80 - 100 fL    MCH 29.5 26.0 - 34.0 pg    MCHC 31.6 (L) 32.0 - 36.0 g/dL    RDW 21.0 (H) 11.5 - 14.5 %    Platelets 275 150 - 450 x10*3/uL    Immature Granulocytes %, Automated 0.0 0.0 - 0.9 %    Immature Granulocytes Absolute, Automated 0.00 0.00 - 0.50 x10*3/uL   Comprehensive metabolic panel   Result Value Ref Range    Glucose 190 (H) 65 - 99 mg/dL    Sodium 138 133 - 145 mmol/L    Potassium 6.1 (HH) 3.4 - 5.1 mmol/L    Chloride 100 97 - 107 mmol/L    Bicarbonate 24 24 - 31 mmol/L    Urea Nitrogen 53 (H) 8 - 25 mg/dL    Creatinine 1.70 (H) 0.40 - 1.60 mg/dL    eGFR 28 (L) >60 mL/min/1.73m*2    Calcium 9.4 8.5 - 10.4 mg/dL    Albumin 3.2 (L) 3.5 - 5.0 g/dL    Alkaline Phosphatase 109 35 - 125 U/L    Total Protein 5.8 (L) 5.9 - 7.9 g/dL    AST 20 5 - 40 U/L    Bilirubin, Total 1.4 (H) 0.1 - 1.2 mg/dL    ALT 31 5 - 40 U/L    Anion Gap 14 <=19 mmol/L   NT Pro-BNP   Result Value Ref Range    PROBNP 22,012 (H) 0 - 624 pg/mL   Serial Troponin, Initial ([facility])   Result Value Ref Range    Troponin T, High Sensitivity 30 (H) <=15 ng/L   Manual Differential   Result Value Ref Range    Neutrophils %, Manual 87.0 40.0 - 80.0 %    Bands %, Manual 3.0 0.0 - 5.0 %    Lymphocytes %, Manual 6.0 13.0 - 44.0 %    Monocytes %, Manual 4.0 2.0 - 10.0 %    Eosinophils %, Manual 0.0 0.0 - 6.0 %    Basophils %, Manual 0.0 0.0 - 2.0 %    Seg Neutrophils Absolute, Manual 4.87 1.60 - 5.00 x10*3/uL    Bands Absolute, Manual 0.17 0.00 - 0.50 x10*3/uL    Lymphocytes Absolute, Manual 0.34 (L) 0.80 - 3.00 x10*3/uL    Monocytes Absolute, Manual 0.22 0.05 - 0.80 x10*3/uL    Eosinophils Absolute, Manual 0.00 0.00 - 0.40 x10*3/uL    Basophils Absolute, Manual 0.00 0.00 - 0.10 x10*3/uL    Total Cells Counted 100     Neutrophils Absolute, Manual 5.04 1.60 - 5.50 x10*3/uL    RBC Morphology See Below     RBC Fragments Few     Ovalocytes Few     [place] Cells Many     Acanthocytes Many    Creatine Kinase   Result Value Ref Range    Creatine  Kinase 37 24 - 195 U/L   Blood Gas Arterial Full Panel   Result Value Ref Range    POCT pH, Arterial 7.44 (H) 7.38 - 7.42 pH    POCT pCO2, Arterial 33 (L) 38 - 42 mm Hg    POCT pO2, Arterial 422 (H) 85 - 95 mm Hg    POCT SO2, Arterial 100 94 - 100 %    POCT Oxy Hemoglobin, Arterial 97.3 94.0 - 98.0 %    POCT Hematocrit Calculated, Arterial 50.0 (H) 36.0 - 46.0 %    POCT Sodium, Arterial 130 (L) 136 - 145 mmol/L    POCT Potassium, Arterial 5.6 (H) 3.5 - 5.3 mmol/L    POCT Chloride, Arterial 101 98 - 107 mmol/L    POCT Ionized Calcium, Arterial 1.13 1.10 - 1.33 mmol/L    POCT Glucose, Arterial 187 (H) 74 - 99 mg/dL    POCT Lactate, Arterial 3.6 (H) 0.4 - 2.0 mmol/L    POCT Base Excess, Arterial -0.9 -2.0 - 3.0 mmol/L    POCT HCO3 Calculated, Arterial 22.4 22.0 - 26.0 mmol/L    POCT Hemoglobin, Arterial 16.6 (H) 12.0 - 16.0 g/dL    POCT Anion Gap, Arterial 12 10 - 25 mmo/L    Patient Temperature 37.0 degrees Celsius    FiO2 85 %    Apparatus NON REBREATHER     Flow 15.0 LPM   Blood Gas Lactic Acid, Venous   Result Value Ref Range    POCT Lactate, Venous 5.1 (HH) 0.4 - 2.0 mmol/L   Serial Troponin, 2 Hour ([facility])   Result Value Ref Range    Troponin T, High Sensitivity 29 (H) <=15 ng/L   Creatine Kinase   Result Value Ref Range    Creatine Kinase 29 24 - 195 U/L   Creatine Kinase, MB   Result Value Ref Range    CKMB 1.1 <10.0 ng/mL    CK-MB Index 3 %MB OF CK   Troponin T   Result Value Ref Range    Troponin T, High Sensitivity 30 (H) <=15 ng/L   Serial Troponin, 6 Hour ([facility])   Result Value Ref Range    Troponin T, High Sensitivity 34 (H) <=15 ng/L   Basic Metabolic Panel   Result Value Ref Range    Glucose 144 (H) 65 - 99 mg/dL    Sodium 136 133 - 145 mmol/L    Potassium 5.0 3.4 - 5.1 mmol/L    Chloride 96 (L) 97 - 107 mmol/L    Bicarbonate 25 24 - 31 mmol/L    Urea Nitrogen 54 (H) 8 - 25 mg/dL    Creatinine 1.70 (H) 0.40 - 1.60 mg/dL    eGFR 28 (L) >60 mL/min/1.73m*2    Calcium 9.4 8.5 - 10.4 mg/dL    Anion Gap 15 <=19  mmol/L   Troponin T   Result Value Ref Range    Troponin T, High Sensitivity 34 (H) <=15 ng/L   Creatine Kinase   Result Value Ref Range    Creatine Kinase 39 24 - 195 U/L   Urinalysis with Reflex Microscopic and Culture   Result Value Ref Range    Color, Urine Light-Orange (N) Light-Yellow, Yellow, Dark-Yellow    Appearance, Urine Turbid (N) Clear    Specific Gravity, Urine 1.020 1.005 - 1.035    pH, Urine 5.0 5.0, 5.5, 6.0, 6.5, 7.0, 7.5, 8.0    Protein, Urine 20 (TRACE) NEGATIVE, 10 (TRACE), 20 (TRACE) mg/dL    Glucose, Urine Normal Normal mg/dL    Blood, Urine 1.0 (3+) (A) NEGATIVE    Ketones, Urine NEGATIVE NEGATIVE mg/dL    Bilirubin, Urine NEGATIVE NEGATIVE    Urobilinogen, Urine Normal Normal mg/dL    Nitrite, Urine NEGATIVE NEGATIVE    Leukocyte Esterase, Urine 75 Hemanth/µL (A) NEGATIVE   Extra Urine Gray Tube   Result Value Ref Range    Extra Tube Hold for add-ons.    Microscopic Only, Urine   Result Value Ref Range    WBC, Urine 11-20 (A) 1-5, NONE /HPF    RBC, Urine >20 (A) NONE, 1-2, 3-5 /HPF    Squamous Epithelial Cells, Urine 1-9 (SPARSE) Reference range not established. /HPF    Bacteria, Urine 1+ (A) NONE SEEN /HPF    Mucus, Urine 1+ Reference range not established. /LPF    Hyaline Casts, Urine 4+ (A) NONE /LPF    Fine Granular Casts, Urine 1+ (A) NONE /LPF   Urine Culture    Specimen: Straight Catheter; Urine   Result Value Ref Range    Urine Culture >100,000 Gram Negative Bacilli (A)    Magnesium   Result Value Ref Range    Magnesium 2.40 1.60 - 3.10 mg/dL   CBC   Result Value Ref Range    WBC 7.4 4.4 - 11.3 x10*3/uL    nRBC 0.0 0.0 - 0.0 /100 WBCs    RBC 5.08 4.00 - 5.20 x10*6/uL    Hemoglobin 14.8 12.0 - 16.0 g/dL    Hematocrit 47.1 (H) 36.0 - 46.0 %    MCV 93 80 - 100 fL    MCH 29.1 26.0 - 34.0 pg    MCHC 31.4 (L) 32.0 - 36.0 g/dL    RDW 19.9 (H) 11.5 - 14.5 %    Platelets 242 150 - 450 x10*3/uL   Hemoglobin A1C   Result Value Ref Range    Hemoglobin A1C 6.8 (H) See below %    Estimated Average  Glucose 148 Not Established mg/dL   Basic Metabolic Panel   Result Value Ref Range    Glucose 120 (H) 65 - 99 mg/dL    Sodium 136 133 - 145 mmol/L    Potassium 5.3 (H) 3.4 - 5.1 mmol/L    Chloride 98 97 - 107 mmol/L    Bicarbonate 23 (L) 24 - 31 mmol/L    Urea Nitrogen 56 (H) 8 - 25 mg/dL    Creatinine 1.70 (H) 0.40 - 1.60 mg/dL    eGFR 28 (L) >60 mL/min/1.73m*2    Calcium 9.2 8.5 - 10.4 mg/dL    Anion Gap 15 <=19 mmol/L   Electrocardiogram, 12-lead PRN ACS symptoms   Result Value Ref Range    Ventricular Rate 116 BPM    QRS Duration 126 ms    QT Interval 352 ms    QTC Calculation(Bazett) 489 ms    R Axis 154 degrees    T Axis -80 degrees    QRS Count 19 beats    Q Onset 216 ms    T Offset 392 ms    QTC Fredericia 438 ms   POCT GLUCOSE   Result Value Ref Range    POCT Glucose 96 74 - 99 mg/dL   Lactic acid, arterial, whole blood   Result Value Ref Range    POCT Lactate, Arterial 1.9 0.4 - 2.0 mmol/L   Renal Function Panel   Result Value Ref Range    Glucose 109 (H) 65 - 99 mg/dL    Sodium 136 133 - 145 mmol/L    Potassium 4.7 3.4 - 5.1 mmol/L    Chloride 99 97 - 107 mmol/L    Bicarbonate 26 24 - 31 mmol/L    Urea Nitrogen 60 (H) 8 - 25 mg/dL    Creatinine 1.70 (H) 0.40 - 1.60 mg/dL    eGFR 28 (L) >60 mL/min/1.73m*2    Calcium 8.7 8.5 - 10.4 mg/dL    Phosphorus 4.5 2.5 - 4.5 mg/dL    Albumin 2.3 (L) 3.5 - 5.0 g/dL    Anion Gap 11 <=19 mmol/L   CBC   Result Value Ref Range    WBC 9.5 4.4 - 11.3 x10*3/uL    nRBC 0.0 0.0 - 0.0 /100 WBCs    RBC 4.95 4.00 - 5.20 x10*6/uL    Hemoglobin 14.5 12.0 - 16.0 g/dL    Hematocrit 45.8 36.0 - 46.0 %    MCV 93 80 - 100 fL    MCH 29.3 26.0 - 34.0 pg    MCHC 31.7 (L) 32.0 - 36.0 g/dL    RDW 19.9 (H) 11.5 - 14.5 %    Platelets 213 150 - 450 x10*3/uL   POCT GLUCOSE   Result Value Ref Range    POCT Glucose 95 74 - 99 mg/dL   POCT GLUCOSE   Result Value Ref Range    POCT Glucose 123 (H) 74 - 99 mg/dL       SIGNATURE: Pierce Daurte MD PATIENT NAME: Angela Ayala   DATE: November 16,  2023 MRN: [id number]   TIME: 3:45 PM PAGER: [phone]

## 2023-11-16 NOTE — CONSULTS
Inpatient consult to Palliative Care  Consult performed by: BETH Dougherty  Consult ordered by: BETH Luis  Reason for consult: Goals of care          Reason For Consult  Reason for Consult: communication / medical decision making     History Of Present Illness  Angela Ayala is a 89 y.o. female with past medical history of reduced ejection fraction is presenting with fall   Patient had a fall on Sunday and laid on the floor for twelve hours before her family found her. She does live home alone. Son states that she did have some shortness of breath on Sunday however that she was doing better yesterday. Today she began to complain of knee pain and they decided to take her to the ER. Son states that she has been having swelling in her lower extremities. She was seen by her cardiologist earlier this month. She sees Dr. Johnson. He increased her Lasix to 40mg BID however son states that she has been refusing to take the second dose due to increased urination. She does report shortness of breath with exertion, denies chest pain, fevers, chills, or cough. Denies nausea or vomiting. No abdominal discomfort. Patient was found to be in A fib RVR in the ED with a rate in the 130s to 140s. Noted BLE edema, LLE with erythema, tenderness, and warmth. Patient was seen by trauma surgery in the ED. Recommended CLARISSA and vascular surgery consulted based on assessment. CT head/neck showed no acute process. Trauma signed off.  Patient was admitted for further evaluation and treatment, initially received diuretics in the emergency room however patient was hypotensive later received IV fluid boluses, she was started on Rocephin for a positive urinalysis, creatinine mildly elevated above baseline, but stable for the past 3 days.  Was also seen by vascular for possible arterial disease, toes noted to be dusky in appearance, patient and son at this time refusing any aggressive work-up or interventions.  Patient  today denies any shortness of breath however she displays tachypnea on nasal cannula oxygen.  She denies chest pain or palpitations.  She continues to have significant lower extremity edema without foot pain or discomfort.     Symptoms (0 - 10, Best to Worst)  [place] Symptom Assessment System  Pain Score: 0 - No pain    BM in last 48 hours? unknown  Serious Illness Conversation  What is your understanding now of where you are with your illness: Patient with a fairly decent understanding of her new diagnosis and its limitations on her life span as well as her functional status  How much information about what is likely to be ahead with your illness  would you like from me: Patient would like information to be fully disclosed to her and her children  What are your most important goals if your health situation worsens: She would like to focus on quality of life  What are your biggest fears and worries about the future with your health: Her biggest fear is not being at home or with her family  What gives you strength as you think about the future with your illness: She draws much of her strength from her family  What abilities are so critical to your life that you can’t imagine living without them: Her mental status, her function  If you become sicker, how much are you willing to go through for the possibility of gaining more time: Patient does not want to return to the hospital if she gets sicker  How much does your family know about your priorities and wishes: Family is very aware of wishes and goals    Personal/Social History    She reports that she has never smoked. She has never used smokeless tobacco. She reports that she does not use drugs. No history on file for alcohol use.    Functional Status    Patient was ambulatory with a walker at home, received assistance with showering from her daughter-in-law, her son and daughter-in-law did most of her IADLs.  Prior to August of this year she was completely  independent with her ADLs and her IADLs.    Caregiving/Caregiver Support  Does the patient require assistance in some or all components of his care, including coordination of medical care? Yes  If Yes, which person serves that role?  child   Caregiver emotional or practical needs:      Past Medical History  She has a past medical history of Age-related nuclear cataract, right eye (10/09/2019), Age-related nuclear cataract, right eye (10/09/2019), Atrial fibrillation (CMS/[facility]), CHF (congestive heart failure) (CMS/[facility]), Chronic kidney disease, Dyslipidemia, GERD (gastroesophageal reflux disease), Glaucoma, Heart disease, Hypertension, Myocardial infarction (CMS/[facility]), Osteoporosis, Other conditions influencing health status, Other conditions influencing health status, Other conditions influencing health status, Personal history of other diseases of the nervous system and sense organs (08/12/2019), Personal history of other diseases of the nervous system and sense organs (10/09/2019), Personal history of transient ischemic attack (TIA), and cerebral infarction without residual deficits (08/12/2019), Stroke (CMS/[facility]), and Unspecified cataract.    Surgical History  She has a past surgical history that includes Other surgical history (08/27/2014); Other surgical history (07/19/2013); Other surgical history (07/19/2013); MR angio head wo IV contrast (04/26/2012); and Knee surgery (Bilateral).     Family History  Family History   Problem Relation Name Age of Onset    Colon cancer Mother      Stroke Father      Glaucoma Father       Allergies  Penicillins    Review of Systems   Constitutional:  Positive for appetite change and fatigue. Negative for activity change, chills and fever.   HENT:  Negative for mouth sores, sinus pain and sore throat.    Eyes:  Negative for pain.   Respiratory:  Positive for shortness of breath. Negative for cough.    Cardiovascular:  Positive for leg swelling. Negative for chest pain and  palpitations.   Gastrointestinal:  Negative for abdominal distention, abdominal pain, constipation, diarrhea, nausea and vomiting.   Endocrine: Negative for polydipsia, polyphagia and polyuria.   Genitourinary:  Negative for difficulty urinating and hematuria.   Musculoskeletal:  Positive for gait problem. Negative for arthralgias, back pain and myalgias.   Skin:  Negative for color change, rash and wound.   Neurological:  Positive for weakness. Negative for dizziness, tremors, seizures, light-headedness, numbness and headaches.   Psychiatric/Behavioral:  Negative for confusion and sleep disturbance. The patient is not nervous/anxious.         Vitals and nursing note reviewed.   Constitutional:       General: She is not in acute distress.     Appearance: She is obese. She is ill-appearing.   HENT:      Head: Normocephalic and atraumatic.      Nose: Nose normal.      Mouth/Throat:      Mouth: Mucous membranes are moist.      Pharynx: Oropharynx is clear.   Eyes:      Extraocular Movements: Extraocular movements intact.      Pupils: Pupils are equal, round, and reactive to light.   Cardiovascular:      Rate and Rhythm: Normal rate and regular rhythm.      Pulses: Normal pulses.      Heart sounds: No murmur heard.  Pulmonary:      Effort: Pulmonary effort is normal. No respiratory distress.      Breath sounds: Normal breath sounds.      Comments: Diminished bases, fine crackles bilaterally  Chest:      Chest wall: No tenderness.   Abdominal:      General: Abdomen is flat. Bowel sounds are normal. There is no distension.      Palpations: Abdomen is soft.      Tenderness: There is no abdominal tenderness.   Musculoskeletal:         General: No swelling, tenderness, deformity or signs of injury. Normal range of motion.      Cervical back: Normal range of motion and neck supple.   Skin:     General: Skin is warm and dry.      Capillary Refill: Capillary refill takes 2 to 3 seconds.      Comments: Stasis dermatitis noted to  "bilateral lower extremities with drainage to both lower extremities, serous.  Lower extremity edema 3+ pitting upper extremity edema 2+   Neurological:      General: No focal deficit present.      Mental Status: She is alert and oriented to person, place, and time.      Motor: Weakness present.      Comments: Assist x2 to sit up at side of the bed   Psychiatric:         Mood and Affect: Mood normal.     Last Recorded Vitals  Blood pressure 93/79, pulse 83, temperature 36.2 °C (97.2 °F), temperature source Rectal, resp. rate 16, height 1.6 m (5' 3\"), weight 89 kg (196 lb 3.4 oz), SpO2 98 %.    Relevant Results  Results for orders placed or performed during the hospital encounter of 11/14/23 (from the past 24 hour(s))   Renal Function Panel   Result Value Ref Range    Glucose 109 (H) 65 - 99 mg/dL    Sodium 136 133 - 145 mmol/L    Potassium 4.7 3.4 - 5.1 mmol/L    Chloride 99 97 - 107 mmol/L    Bicarbonate 26 24 - 31 mmol/L    Urea Nitrogen 60 (H) 8 - 25 mg/dL    Creatinine 1.70 (H) 0.40 - 1.60 mg/dL    eGFR 28 (L) >60 mL/min/1.73m*2    Calcium 8.7 8.5 - 10.4 mg/dL    Phosphorus 4.5 2.5 - 4.5 mg/dL    Albumin 2.3 (L) 3.5 - 5.0 g/dL    Anion Gap 11 <=19 mmol/L   CBC   Result Value Ref Range    WBC 9.5 4.4 - 11.3 x10*3/uL    nRBC 0.0 0.0 - 0.0 /100 WBCs    RBC 4.95 4.00 - 5.20 x10*6/uL    Hemoglobin 14.5 12.0 - 16.0 g/dL    Hematocrit 45.8 36.0 - 46.0 %    MCV 93 80 - 100 fL    MCH 29.3 26.0 - 34.0 pg    MCHC 31.7 (L) 32.0 - 36.0 g/dL    RDW 19.9 (H) 11.5 - 14.5 %    Platelets 213 150 - 450 x10*3/uL   POCT GLUCOSE   Result Value Ref Range    POCT Glucose 95 74 - 99 mg/dL   POCT GLUCOSE   Result Value Ref Range    POCT Glucose 123 (H) 74 - 99 mg/dL    Vascular US ankle brachial index (CLARISSA) without exercise    Result Date: 11/15/2023           [facility] [id number] [place], [place] [address]            Phone [phone]  Vascular Lab Report  [facility] US ANKLE BRACHIAL INDEX (CLARISSA) WITHOUT EXERCISE Patient " Name:      SHREYAS Mishra Physician: [id number] Itzel Carolina MD Study Date:        11/15/2023          Ordering Provider: [id number] GABE MCCOY JENNY MRN/PID:           [id number]            Fellow: Accession#:        [id number]        Technologist:      Marley Mak RVT Date of Birth/Age: 2/18/1929 / 89      Technologist 2:                    years Gender:            F                   Encounter#:        [id number] Admission Status:  Inpatient           Location           [facility]                                        Performed:  Diagnosis/ICD: Peripheral vascular disease, unspecified-I73.9 Indication:    Peripheral vascular disease CPT Codes:     91732 Peripheral artery CLARISSA Only  CONCLUSIONS: Right Lower PVR: Evidence of mild arterial occlusive disease in the right lower extremity at rest. Right pressures of >220 mmHg suggest no compressibility of vessels and may make absolute Segmental Limb Pressures (SLP) unreliable. Decreased digital perfusion noted. Triphasic flow is noted in the right common femoral artery, right posterior tibial artery and right dorsalis pedis artery. Severity of disease called by PVR ankle tracing. Unable to obtain TBI due to flat digital tracing. Left Lower PVR: Evidence of mild arterial occlusive disease in the left lower extremity at rest. Left pressures of >220 mmHg suggest no compressibility of vessels and may make absolute Segmental Limb Pressures (SLP) unreliable. Decreased digital perfusion noted. Biphasic flow is noted in the left common femoral artery, left posterior tibial artery and left dorsalis pedis artery. Severity of disease called by PVR ankle tracing.  Imaging & Doppler Findings:  RIGHT Lower PVR                Pressures Ratios Right Posterior Tibial (Ankle) 134 mmHg  1.76 Right Dorsalis Pedis (Ankle)   93 mmHg   1.22   LEFT Lower PVR                Pressures Ratios Left Posterior Tibial (Ankle) 106  mmHg  1.39 Left Dorsalis Pedis (Ankle)   100 mmHg  1.32 Left Digit (Great [place])        34 mmHg   0.45                      Right   Left Brachial Pressure 76 mmHg 76 mmHg   [id number] Itzel Carolina MD Electronically signed by [id number] Itzel Carolina MD on 11/15/2023 at 6:05:05 PM  ** Final **     Electrocardiogram, 12-lead PRN ACS symptoms    Result Date: 11/15/2023  Atrial fibrillation with rapid ventricular response Left bundle branch block Abnormal ECG When compared with ECG of 14-NOV-2023 12:35, (unconfirmed) No significant change was found Confirmed by Gus Neely ([id number]) on 11/15/2023 10:27:28 AM    Lower extremity venous duplex bilateral    Result Date: 11/15/2023  Interpreted By:  Helio Sparrow, STUDY: [facility] US LOWER EXTREMITY VENOUS DUPLEX BILATERAL;  11/14/2023 6:39 pm   INDICATION: Signs/Symptoms:BLE edema, LLE pain.   COMPARISON: 10/18/2023   ACCESSION NUMBER(S): [id number]   ORDERING CLINICIAN: NEHAL MANZANO   TECHNIQUE: Vascular ultrasound of the bilateral lower extremities was performed. Real-time compression views as well as Gray scale, color Doppler and spectral Doppler waveform analysis was performed.   FINDINGS: Evaluation of the visualized portions of the bilateral common femoral vein, proximal, mid, and distal femoral vein, and popliteal vein were performed.  Evaluation of the visualized portions of the  posterior tibial and peroneal veins were also performed.     The evaluated veins demonstrate normal compressibility. There is intact venous flow demonstrating normal respiratory variability and normal augmentation of flow with calf compression. Therefore, there is no ultrasonographic evidence for deep vein thrombosis within the evaluated veins. Limited characterization of the calf veins.         1. No DVT visualized portions bilateral lower extremities.   MACRO: None   Signed by: Helio Sparrow 11/15/2023 8:40 AM Dictation workstation:   [id number]       Assessment/Plan   IMP:    Acute respiratory  failure  Acute on chronic systolic heart failure  Atrial fibrillation with a rapid ventricular rate  [place] on CKD  Fall  UTI  Palliative care    DNR CCA/DNI  Patient is fully capable  Her daughter Deloris is her documented healthcare power of , her son Gus is her alternate.  Chart reviewed, discussed with nephrology vascular and attending service.  Patient with a significant change in her overall quality of life and functional status since August of this year when she presented to [place] with acute systolic heart failure and A-fib with a rapid ventricular rate, since then she has had 3 subsequent hospital visits, 2 of them resulting in admission.  Sat with the patient and her daughter-in-law, did offer to call her son and her daughter however patient did not want me to contact children for goals of care discussion.  Ultimately she is tired, she does not want to keep rehospitalizing, she wants to focus on her family and her quality of life.  Her ultimate goal is to return home and never return to the hospital, she has no interest in aggressive interventions in order to sustain life or provide her more time to live.  She is not afraid to die.  She is interested in looking into a comfort model of care.  From a practical perspective the daughter-in-law reports that the patient lives home alone and is currently not safe to return home without additional support services in place, she is aware that hospice cannot provide 24-hour care.  Family is fully supportive of wishes of patient to focus on comfort and quality of life and she will talk to her  about setting up a hospice informational meeting.  I provided her my phone number if he had questions or concerns or would like to discuss the patient.  I will await a callback from the son.    Patient is a two-person max assist currently we may need to medically stabilize patient and attempt to send her to rehab in order to optimize physical function, knowing  full well that patient may not benefit from rehab given the reduced ejection fraction.  This would also allow more time for the family to rally support services for the patient's ultimate return home.    Treatment model of care at this point in time, waiting for callback from the son Gus.  Patient would like to eventually go home with hospice, will need to discuss with son and set up referral.    Addendum:   Family meeting with sonGus and dtr Deloris. Both supportive of patient wishes to transition to comfort care. No interest in continuing services with [facility], [facility] was active with patient. Long discussion with son and daughter about different options for patient, SNF vs placeement vs home with private pay and hospice. Ultimately patient not likely to benefit from rehab, but too weak to go home right away, very dyspneic at rest on oxygen, BP somewhat labile. Family agreeable to hospice referral and request that patient be evaluated for hospice house.   Symptom Management  Pain: None  Medications recommended for pain?  No  Tiredness: Moderate to severe  Nausea: None  Depression: None  Anxiety: None  Drowsiness: None  Appetite: Reduced  Wellbeing: Significant decline  Dyspnea: Mild  Intervention recommended for dyspnea?  no  Other: Not applicable  Intervention recommended for constipation?  No    Provider estimate of survival: 1-6 months  Patient Prognostic Awareness: Yes - congruent with provider estimation    Patient/proxy preference for information  Prefers full information    Goals of Care  Patient desiring transition to a comfort model of care    Is the patient hospice-eligible?   Yes  Was a discussion held re hospice services?   yes  Was a decision made re hospice services?  Yes    Other Palliative Support  Family    I spent 75 minutes in the professional and overall care of this patient.      Gale Krishnamurthy, APRN-CNP

## 2023-11-16 NOTE — NURSING NOTE
Rectal Temp checked 36.2 centigrade 97.2 F,Megha Ellington notified,Chg bath given,turned and repositioned on to Rt side ,feet elevated on a pillow.B/l Le  Ace wrap done .

## 2023-11-16 NOTE — PROGRESS NOTES
Angela Mead is a 89 y.o. female on day 2 of admission presenting with Acute on chronic diastolic congestive heart failure (CMS/HCC).      Subjective   Patient seen and examined. Awake/alert/oriented. Resting in bed. Denies chest pain, shortness of breath, fevers, chills, nausea, or vomiting. No abdominal discomfort. Spoke with RN. Patient hypotensive overnight, given IV bolus and started on Midodrine. RN was told that the patient was talking about dying and stated that she was just tired. No complaints this morning. Will consult palliative care.           Objective     Last Recorded Vitals  BP 93/79 (BP Location: Left arm, Patient Position: Sitting)   Pulse 84   Temp 36.8 °C (98.2 °F) (Oral)   Resp 17   Wt 89 kg (196 lb 3.4 oz)   SpO2 96%   Intake/Output last 3 Shifts:    Intake/Output Summary (Last 24 hours) at 11/16/2023 1109  Last data filed at 11/16/2023 0852  Gross per 24 hour   Intake 1410 ml   Output 200 ml   Net 1210 ml       Admission Weight  Weight: 89 kg (196 lb 3.4 oz) (11/14/23 1311)    Daily Weight  11/14/23 : 89 kg (196 lb 3.4 oz)    Image Results  Vascular US ankle brachial index (CLARISSA) without exercise             [place] [address]             Phone [phone]       Vascular Lab Report     [facility] US ANKLE BRACHIAL INDEX (CLARISSA) WITHOUT EXERCISE    Patient Name:      ANGELA MEAD      Reading Physician: [id number] Itzel Carolina MD  Study Date:        11/15/2023          Ordering Provider: [id number] GABE ALVARADO  MRN/PID:           [id number]            Fellow:  Accession#:        [id number]        Technologist:      Marley Mak RVT  Date of Birth/Age: 2/18/1929 / 89      Technologist 2:                     years  Gender:            F                   Encounter#:        [id number]  Admission Status:  Inpatient           Location           [facility]                                          Performed:       Diagnosis/ICD: Peripheral vascular disease, unspecified-I73.9  Indication:    Peripheral vascular disease  CPT Codes:     77820 Peripheral artery CLARISSA Only       CONCLUSIONS:  Right Lower PVR: Evidence of mild arterial occlusive disease in the right lower extremity at rest. Right pressures of >220 mmHg suggest no compressibility of vessels and may make absolute Segmental Limb Pressures (SLP) unreliable. Decreased digital perfusion noted. Triphasic flow is noted in the right common femoral artery, right posterior tibial artery and right dorsalis pedis artery. Severity of disease called by PVR ankle tracing. Unable to obtain TBI due to flat digital tracing.  Left Lower PVR: Evidence of mild arterial occlusive disease in the left lower extremity at rest. Left pressures of >220 mmHg suggest no compressibility of vessels and may make absolute Segmental Limb Pressures (SLP) unreliable. Decreased digital perfusion noted. Biphasic flow is noted in the left common femoral artery, left posterior tibial artery and left dorsalis pedis artery. Severity of disease called by PVR ankle tracing.     Imaging & Doppler Findings:     RIGHT Lower PVR                Pressures Ratios  Right Posterior Tibial (Ankle) 134 mmHg  1.76  Right Dorsalis Pedis (Ankle)   93 mmHg   1.22          LEFT Lower PVR                Pressures Ratios  Left Posterior Tibial (Ankle) 106 mmHg  1.39  Left Dorsalis Pedis (Ankle)   100 mmHg  1.32  Left Digit (Great Toe)        34 mmHg   0.45                             Right   Left  Brachial Pressure 76 mmHg 76 mmHg          [id number] Itzel Carolina MD  Electronically signed by [id number] Itzel Carolina MD on 11/15/2023 at 6:05:05 PM       ** Final **  Electrocardiogram, 12-lead PRN ACS symptoms  Atrial fibrillation with rapid ventricular response  Left bundle branch block  Abnormal ECG  When compared with ECG of 14-NOV-2023 12:35, (unconfirmed)  No significant change was found  Confirmed by Gus Neely  ([id number]) on 11/15/2023 10:27:28 AM  Lower extremity venous duplex bilateral  Narrative: Interpreted By:  Helio Sparrow,   STUDY:  [facility] US LOWER EXTREMITY VENOUS DUPLEX BILATERAL;  11/14/2023 6:39 pm      INDICATION:  Signs/Symptoms:BLE edema, LLE pain.      COMPARISON:  10/18/2023      ACCESSION NUMBER(S):  [id number]      ORDERING CLINICIAN:  NEHAL MANZANO      TECHNIQUE:  Vascular ultrasound of the bilateral lower extremities was performed.  Real-time compression views as well as Gray scale, color Doppler and  spectral Doppler waveform analysis was performed.      FINDINGS:  Evaluation of the visualized portions of the bilateral common femoral  vein, proximal, mid, and distal femoral vein, and popliteal vein were  performed.  Evaluation of the visualized portions of the  posterior  tibial and peroneal veins were also performed.          The evaluated veins demonstrate normal compressibility. There is  intact venous flow demonstrating normal respiratory variability and  normal augmentation of flow with calf compression. Therefore, there  is no ultrasonographic evidence for deep vein thrombosis within the  evaluated veins. Limited characterization of the calf veins.          Impression: 1. No DVT visualized portions bilateral lower extremities.      MACRO:  None      Signed by: Helio Sparrow 11/15/2023 8:40 AM  Dictation workstation:   [id number]      Physical Exam  Constitutional:       Appearance: Normal appearance.   HENT:      Head: Normocephalic and atraumatic.   Eyes:      Extraocular Movements: Extraocular movements intact.      Conjunctiva/sclera: Conjunctivae normal.   Cardiovascular:      Rate and Rhythm: Normal rate. Rhythm irregular.   Pulmonary:      Effort: Pulmonary effort is normal.      Breath sounds: No wheezing, rhonchi or rales.   Abdominal:      General: Bowel sounds are normal.      Tenderness: There is no abdominal tenderness.   Musculoskeletal:      Right lower leg: Edema present.       Left lower leg: Edema present.   Skin:     General: Skin is warm and dry.   Neurological:      General: No focal deficit present.      Mental Status: She is alert and oriented to person, place, and time.         Relevant Results  Lab Results   Component Value Date    GLUCOSE 109 (H) 11/16/2023    CALCIUM 8.7 11/16/2023     11/16/2023    K 4.7 11/16/2023    CO2 26 11/16/2023    CL 99 11/16/2023    BUN 60 (H) 11/16/2023    CREATININE 1.70 (H) 11/16/2023     Lab Results   Component Value Date    WBC 9.5 11/16/2023    HGB 14.5 11/16/2023    HCT 45.8 11/16/2023    MCV 93 11/16/2023     11/16/2023     Electrocardiogram, 12-lead PRN ACS symptoms    Result Date: 11/15/2023  Atrial fibrillation with rapid ventricular response Left bundle branch block Abnormal ECG When compared with ECG of 14-NOV-2023 12:35, (unconfirmed) No significant change was found Confirmed by Gus Neely ([id number]) on 11/15/2023 10:27:28 AM    Lower extremity venous duplex bilateral    Result Date: 11/15/2023  Interpreted By:  Helio Sparrow, STUDY: [facility] LOWER EXTREMITY VENOUS DUPLEX BILATERAL;  11/14/2023 6:39 pm   INDICATION: Signs/Symptoms:BLE edema, LLE pain.   COMPARISON: 10/18/2023   ACCESSION NUMBER(S): [id number]   ORDERING CLINICIAN: NEHAL MANZANO   TECHNIQUE: Vascular ultrasound of the bilateral lower extremities was performed. Real-time compression views as well as Gray scale, color Doppler and spectral Doppler waveform analysis was performed.   FINDINGS: Evaluation of the visualized portions of the bilateral common femoral vein, proximal, mid, and distal femoral vein, and popliteal vein were performed.  Evaluation of the visualized portions of the  posterior tibial and peroneal veins were also performed.     The evaluated veins demonstrate normal compressibility. There is intact venous flow demonstrating normal respiratory variability and normal augmentation of flow with calf compression. Therefore, there is no  ultrasonographic evidence for deep vein thrombosis within the evaluated veins. Limited characterization of the calf veins.         1. No DVT visualized portions bilateral lower extremities.   MACRO: None   Signed by: Helio Sparorw 11/15/2023 8:40 AM Dictation workstation:   [id number]    XR knee left 4+ views    Result Date: 11/14/2023  Interpreted By:  Josue Anand, STUDY: XR KNEE LEFT 4+ VIEWS; 11/14/2023 1:29 pm   INDICATION: Signs/Symptoms:fall.   COMPARISON: None available.   ACCESSION NUMBER(S): [id number]   ORDERING CLINICIAN: MICHAEL COOPER   TECHNIQUE: Views: AP lateral, and oblique views of the left knee.   FINDINGS: There is no evidence for fracture or subluxation. The left total knee prosthesis components are intact. No abnormal lucencies. No evidence for a joint effusion. No abnormal radiopaque foreign body.       Normal appearance of left total knee prosthesis. No evidence for an acute fracture or acute osseous abnormality.   Signed by: Josue Anand 11/14/2023 2:08 PM Dictation workstation:   [id number]    XR chest 1 view    Result Date: 11/14/2023  Interpreted By:  Josue Anand, STUDY: XR CHEST 1 VIEW; 11/14/2023 1:29 pm   INDICATION: Signs/Symptoms:sob.   COMPARISON: 09/27/2023   ACCESSION NUMBER(S): [id number]   ORDERING CLINICIAN: MICHAEL COOPER   TECHNIQUE: 1 view of the chest was performed.   FINDINGS: The cardiomediastinal silhouette is mildly enlarged, unchanged. Small-moderate bilateral pleural effusions, similar to the prior study. Mild prominence of the interstitium suggest congestive changes with probable mild pulmonary edema. Bibasilar airspace opacities presumed atelectasis adjacent to the pleural effusions although pneumonia should be clinically excluded.       Small-moderate bilateral pleural effusions. Bilateral airspace opacity in the lower lobes most likely atelectasis although pneumonia should be clinically excluded. Congestive changes with probable mild pulmonary  edema.   Signed by: Josue Anand 11/14/2023 2:02 PM Dictation workstation:   [id number]    CT head wo IV contrast    Result Date: 11/14/2023  Interpreted By:  Josue Anand, STUDY: MICHAEL COOPER; 11/14/2023 1:06 pm   INDICATION: fall;   COMPARISON: None   ACCESSION NUMBER(S): [id number]   ORDERING CLINICIAN: MICHAEL COOPER   TECHNIQUE: Contiguous axial images were acquired from the vertex through the posterior fossa without IV contrast. All CT examinations are performed with 1 or more of the following dose reduction techniques: Automated exposure control, adjustment of mA and/or kv according to patient's size, or use of iterative reconstruction techniques.   FINDINGS: No focal mass effect or midline shift is identified. The ventricles and sulci are symmetric and appropriate for the patient's age.   Moderate degree of nonspecific white matter change most consistent with chronic small-vessel ischemic disease. 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 The gray white matter differentiation is preserved.   No acute intracranial hemorrhage is seen. No intra-axial or extra-axial fluid collection is seen.   The visualized paranasal sinuses and mastoid air cells are clear.       No CT evidence for acute intracranial pathology.   Moderate degree of nonspecific white matter change most consistent with chronic small-vessel ischemic disease.   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   Signed by: Josue Anand 11/14/2023 1:55 PM Dictation workstation:   [id number]    CT cervical spine wo IV contrast    Result Date: 11/14/2023  Interpreted By:  Josue Anand, STUDY: CT CERVICAL SPINE WO IV CONTRAST; 11/14/2023 1:06 pm   INDICATION: Signs/Symptoms:fall;   COMPARISON: None   ACCESSION NUMBER(S): [id number]   ORDERING CLINICIAN: MICHAEL COOPER   TECHNIQUE: Contiguous axial images were acquired from the skull  base to the lung apices. Coronal and sagittal reformatted images were obtained. All CT examinations are performed with 1 or more of the following dose reduction techniques: Automated exposure control, adjustment of mA and/or kv according to patient's size, or use of iterative reconstruction techniques.   FINDINGS: There is a normal cervical lordosis. No acute fracture or traumatic malalignment is identified. Minimal degenerative appearing anterolisthesis of C6 on C7, proximally 2 mm. There is also trace degenerative anterolisthesis of C7 on T1. Facet degenerative changes and uncovertebral joint degenerative changes are present.     The occipital condyles, arch of C1, and the odontoid processes are intact. The atlantoaxial relationship is maintained with degenerative changes and calcified pannus formation.   There is moderate disc space narrowing at C3-4. Severe disc space narrowing at C4-5, C5-6, and C6-7. No evidence for high-grade bony spinal canal stenosis. However there is multilevel high-grade bilateral neural foramina stenosis.   The visualized lung apices are unremarkable.       1. No acute fracture or traumatic malalignment. 2. Degenerative disc disease and spondylosis as described in the body of the report.     Signed by: Josue Anand 11/14/2023 1:53 PM Dictation workstation:   [id number]    Vascular US lower extremity venous duplex bilateral    Result Date: 10/18/2023  Interpreted By:  Ricky Whitehead, STUDY: [facility] US LOWER EXTREMITY VENOUS DUPLEX BILATERAL; 10/18/2023 9:31 pm   INDICATION: Signs/Symptoms:swelling.   COMPARISON: None.   ACCESSION NUMBER(S): [id number]   ORDERING CLINICIAN: KOJO MCCLELLAN   TECHNIQUE: 2D grayscale and color-flow duplex images were obtained along with Doppler spectral waveform analysis of the deep venous system of the lower extremity from the groin to the knee. Attempts were made to image the calf veins. Venous compression and augmentation were performed.   FINDINGS:  Right Lower Extremity: There is normal compressibility and flow within the visualized vessels of the deep venous system of this lower extremity.   Left Lower Extremity: There is normal compressibility and flow within the visualized vessels of the deep venous system of this lower extremity.         No evidence for deep venous thrombosis within the limits of this examination.   Signed by: Ricky Whitehead 10/18/2023 9:41 PM Dictation workstation:   [id number]     CLARISSA: Right Lower PVR: Evidence of mild arterial occlusive disease in the right lower extremity at rest. Right pressures of >220 mmHg suggest no compressibility of vessels and may make absolute Segmental Limb Pressures (SLP) unreliable. Decreased digital perfusion noted. Triphasic flow is noted in the right common femoral artery, right posterior tibial artery and right dorsalis pedis artery. Severity of disease called by PVR ankle tracing. Unable to obtain TBI due to flat digital tracing.  Left Lower PVR: Evidence of mild arterial occlusive disease in the left lower extremity at rest. Left pressures of >220 mmHg suggest no compressibility of vessels and may make absolute Segmental Limb Pressures (SLP) unreliable. Decreased digital perfusion noted. Biphasic flow is noted in the left common femoral artery, left posterior tibial artery and left dorsalis pedis artery. Severity of disease called by PVR ankle tracing.       Assessment/Plan      Principal Problem:    Acute on chronic diastolic congestive heart failure (CMS/HCC)  Active Problems:    Hyperlipidemia    Hypertension    Atrial fibrillation with RVR (CMS/HCC)    Atrial fibrillation with RVR  Noted to be in A fib RVR  Does have a history of A fib, now rate controlled  Continue Eliquis, renally dosed  Continue amiodarone and metoprolol  Cardiology consulted, appreciate recs  Monitor on tele    Hypotension  Hypotensive overnight, given another bolus and started on midodrine with improvement  Metoprolol  discontinued by cardiology today  Monitor close     Hyperkalemia  Potassium elevated at 6.1, 5.0, 5.2, 4.7  Nephrology consulted  monitor  On tele     Acute on Chronic Systolic Heart Failure  BNP elevated 11375  9/25/23 Echocardiogram showed EF 25-30%  Follows with Dr. Johnson outpatient  Strict I/O  Daily Weight  Takes Lasix 40mg BID at home, given IV Lasix x one dose in ED, hold further Lasix for now, has been hypotensive and received fluids    Acute UTI  UA positive, culture growing gram negative bacilli  IV ceftriaxone  Lactic acid was elevated, repeat normal     Bilateral Lower Extremity Edema  LLE with erythema and warmth  US BLE negative for DVT  Noted purple discolor to toes, ABIs ordered and vascular surgery consulted  CLARISSA results as above  Vascular surgery  recommending light compression, keep toes of the right foot warm, recommending arterial study, son does not want aggressive treatment, vascular to speak with the son if there is a need for CTA.      Fall  Patient fell on Sunday, laid on the floor for 12 hours, there was a concern for rhabdo, CK today 37, 29, 39, will hold off on fluids   Fall precautions  PT/OT     CAD   Continue ASA/statin  Troponin elevated at 30, 29, 30, 34  Monitor on tele  Cardiology is on consult     CKD  Creatinine 1.7, baseline appears to be 1.5-1.6  Monitor renal function closely  Avoid nephrotoxic medications  Renally dose meds  Nephrology on consult     Hyperlipidemia  Statin     Hyperglycemia/DM II, not previously diagnosed  Glucose 190  Will check hgbA1C, 6.8, diabetes educator consulted, family was educated and son did not feel she needed treatment for her diabetes at the age of 89 per diabetic educator note  Sliding scale insulin, monitor blood glucose, hypoglycemia protocol     Plan  Admit to SDU  Monitor on tele  Antiarrhythmics, stop metoprolol for now  Started on midodrine  Hold diuresis for now  DVT prophylaxis: Eliquis  Cardiology consulted, appreciate recs  PT/OT    CBC and BMP in AM     CODE STATUS: Reviewed with the patient and family. She is a DNRCCA DNI. Patient verbalized thoughts of dying and feeling tired to RN overnight. Will consult palliative care to discuss goals of care.               Megha Wright, VIJAYA-CNP

## 2023-11-16 NOTE — NURSING NOTE
Called hospitalist for patient's low blood pressure, per hospitalist continue to monitor at this time. Patient was given midodrine and 1000mL of normal saline earlier today for low blood pressure.

## 2023-11-16 NOTE — PROGRESS NOTES
Occupational Therapy    OT Treatment    Patient Name: Angela Ayala  MRN: [id number]  Today's Date: 11/16/2023  Time Calculation  Start Time: 1331  Stop Time: 1355  Time Calculation (min): 24 min         Assessment:  OT Assessment: Gradual progress made towards OT goals. Continue with current OT POC to maximize safety and independence during ADL/ADL transfers prior to discharge.  Evaluation/Treatment Tolerance: Patient tolerated treatment well  End of Session Communication: Bedside nurse  End of Session Patient Position: Bed, 3 rail up, Alarm on  OT Assessment Results: Decreased ADL status, Decreased upper extremity strength, Decreased safe judgment during ADL, Decreased endurance, Decreased functional mobility, Decreased gross motor control, Decreased IADLs  Evaluation/Treatment Tolerance: Patient tolerated treatment well  Plan:  OT Discharge Recommendations: Moderate intensity level of continued care       Subjective   Previous Visit Info:  OT Last Visit  OT Received On: 11/16/23  Precautions:  Hearing/Visual Limitations: B hearing aids, reading glasses  Medical Precautions: Fall precautions  Vital Signs:  Vital Signs  SpO2: 96 % (1L O2 via NC)  BP: 84/69 (RN aware, seated EOB BP 91/73)  Pain:  Pain Assessment  Pain Assessment: 0-10  Pain Score: 0 - No pain  Clinical Progression: Not changed    Objective    Cognition:  Cognition  Overall Cognitive Status: Within Functional Limits  Coordination:  Movements are Fluid and Coordinated: Yes    Functional Standing Tolerance:  Time: ~1min  Activity: static standing  Functional Standing Tolerance Comments: use of FWW and modAx1+1 to remain in upright posture. Attempt to take lateral side steps with maxAx2 however unable to complete d/t LLE buckling.  Bed Mobility/Transfers: Bed Mobility  Bed Mobility: Yes  Bed Mobility 1  Bed Mobility 1: Supine to sitting, Sitting to supine, Rolling right, Rolling left, Scooting  Bed Mobility 2  Level of Assistance 2: Maximum  assistance  Bed Mobility Comments 2: all aspects of bed mobility completed with maxAx2. Verbal/ tactile cues provided for sequencing to maximize potential for task completion.    Transfers  Transfer: Yes  Transfer 1  Transfer From 1: Bed to  Transfer to 1: Stand  Technique 1: Sit to stand, Stand to sit  Transfer Device 1: Walker  Transfer Level of Assistance 1: Maximum assistance, Moderate assistance  Trials/Comments 1: sit<>stands completed from standard EOB height with FWW and maxAx1+modAx1- verbal/ tactile cues required for proper hand placement to increase safety and decrease risk of falls.       Other Activity:   To increase functional tolerance and challenge sitting balance pt tolerated sitting EOB >10min with Fair sitting balance and BUE support. Tasks completed to maximize potential during ADLs.      Outcome Measures:[facility] Daily Activity  Putting on and taking off regular lower body clothing: A lot  Bathing (including washing, rinsing, drying): A lot  Putting on and taking off regular upper body clothing: A lot  Toileting, which includes using toilet, bedpan or urinal: A lot  Taking care of personal grooming such as brushing teeth: A little  Eating Meals: A little  Daily Activity - Total Score: 14        Education Documentation  ADL Training, taught by NIKO Cortez at 11/16/2023  4:40 PM.  Learner: Patient  Readiness: Acceptance  Method: Explanation, Demonstration  Response: Needs Reinforcement    Education Comments  No comments found.        OP EDUCATION:       Goals:  Encounter Problems       Encounter Problems (Active)       Balance       STG - Maintains static standing balance with upper extremity support and min assist x 1. (Progressing)       Start:  11/15/23    Expected End:  11/29/23            STG - Maintains static sitting balance without upper extremity support with independence. (Progressing)       Start:  11/15/23    Expected End:  11/29/23               Bathing       STG - Patient  will batheUB/LB with minimal assist and AE (Progressing)       Start:  11/15/23    Expected End:  12/15/23               Dressing Upper Extremities       LTG - Patient will complete upper body dressing independent with set up (Progressing)       Start:  11/15/23    Expected End:  12/15/23               Dressings Lower Extremities       LTG - Patient will dress lower body with minimal assist and AE (Progressing)       Start:  11/15/23    Expected End:  12/15/23               Grooming       LTG - Patient will complete daily grooming tasks independent with set up (Progressing)       Start:  11/15/23    Expected End:  12/15/23               Mobility       perform functional mobility to and from the bathroom with 2ww and minimal assist.  (Progressing)       Start:  11/15/23    Expected End:  12/15/23               Mobility       STG - Patient will ambulate 25' with rolling walker and min assist x 1. (Progressing)       Start:  11/15/23    Expected End:  11/29/23               Safety       LTG - Patient will demonstrate safety requirements appropriate to situation/environment (Progressing)       Start:  11/15/23    Expected End:  11/29/23               Therapeutic Exercise       Perform BUE exercise with close supervision (Progressing)       Start:  11/15/23    Expected End:  12/15/23               Toileting       LTG - Patient will demonstrate use of appropriate intervention for safe toileting with minimal assist and 2ww (Progressing)       Start:  11/15/23    Expected End:  12/15/23               Transfers       LTG - Patient will  perform all functional transfers with close supervision and 2ww (Progressing)       Start:  11/15/23    Expected End:  12/15/23               Transfers       STG - Patient to transfer to and from sit to supine with modified independence. (Progressing)       Start:  11/15/23    Expected End:  11/29/23            STG - Patient will transfer sit to and from stand with min assist x 1.  (Progressing)       Start:  11/15/23    Expected End:  11/29/23

## 2023-11-16 NOTE — CONSULTS
Wound Care Consult     Visit Date: 11/16/2023      Patient Name: Angela Ayala         MRN: [id number]           YOB: 1929     Reason for Consult: Right lower leg and Right Ischial wounds     Wound History: Present on admission, 2 weeping stasis wounds to Right lateral lower leg, mottling to right toes, Petechia rash to Left lower leg, Deep tissue injury to Right ischium and Right upper posterior thigh. Patient had fallen at home and found 12hours later. Son has been acewrapping Bilateral lower extremities tightly. Patient noncompliant with medications-has not been taking lasix.      A 89 y.o. year old female admitted for Principal Problem:    Acute on chronic diastolic congestive heart failure (CMS/HCC)  Active Problems:    Hyperlipidemia    Hypertension    Atrial fibrillation with RVR (CMS/[facility])      Past Medical History:   Diagnosis Date    Age-related nuclear cataract, right eye 10/09/2019    Age-related nuclear cataract, right eye    Age-related nuclear cataract, right eye 10/09/2019    Age-related nuclear cataract of right eye    Atrial fibrillation (CMS/HCC)     CHF (congestive heart failure) (CMS/HCC)     Chronic kidney disease     Dyslipidemia     GERD (gastroesophageal reflux disease)     Glaucoma     Heart disease     Hypertension     Myocardial infarction (CMS/HCC)     Osteoporosis     Other conditions influencing health status     Mammogram    Other conditions influencing health status     DEXA Body Composition Study    Other conditions influencing health status     Colonoscopy (Fiberoptic)    Personal history of other diseases of the nervous system and sense organs 08/12/2019    History of hearing loss    Personal history of other diseases of the nervous system and sense organs 10/09/2019    History of cataract    Personal history of transient ischemic attack (TIA), and cerebral infarction without residual deficits 08/12/2019    History of cerebrovascular accident    Stroke (CMS/[facility])      Unspecified cataract     Cataract of left eye      Past Surgical History:   Procedure Laterality Date    KNEE SURGERY Bilateral     MR HEAD ANGIO WO IV CONTRAST  04/26/2012    MR HEAD ANGIO WO IV CONTRAST [facility]    OTHER SURGICAL HISTORY  08/27/2014    Laser Suite Retina Treatment Consisted Of    OTHER SURGICAL HISTORY  07/19/2013    Reported Hx Of Knee Replacement    OTHER SURGICAL HISTORY  07/19/2013    Remove Cataract, Corneo-Scleral Section Left Eye       Scheduled medications  amiodarone, 200 mg, oral, Daily  ammonium lactate, 1 Application, Topical, BID  apixaban, 2.5 mg, oral, BID  aspirin, 81 mg, oral, Daily  atorvastatin, 40 mg, oral, Nightly  cefTRIAXone, 1 g, intravenous, q24h  digoxin, 125 mcg, oral, Every other day  dorzolamide-timoloL, 1 drop, Both Eyes, Nightly  ferrous sulfate (325 mg ferrous sulfate), 1 tablet, oral, Daily with breakfast  insulin lispro, 0-5 Units, subcutaneous, TID with meals  latanoprost, 1 drop, Both Eyes, Nightly  midodrine, 5 mg, oral, q8h  pantoprazole, 40 mg, oral, Daily      Continuous medications     PRN medications  PRN medications: acetaminophen **OR** acetaminophen **OR** acetaminophen, bisacodyl, dextrose 10 % in water (D10W), dextrose, glucagon, oxygen    Allergies   Allergen Reactions    Penicillins Hives       Pertinent Labs:   Albumin   Date Value Ref Range Status   11/16/2023 2.3 (L) 3.5 - 5.0 g/dL Final       Wound Assessment:  Wound 11/14/23 Pressure Injury Buttocks Right (Active)   Wound Image   11/16/23 1130   Susu-Wound Assessment [place];Purple 11/16/23 1130   Pressure Injury Stage DTPI 11/16/23 1130       Wound Heel Left (Active)       Wound 11/14/23 Heel Right (Active)       Wound 11/14/23 Other (comment) Pretibial Right;Left (Active)       Wound Team Summary Assessment:       Exam conducted on day 2 of stay with knowledge of Floor Nurse. Introductions made to patient, alert and oriented. On exam patient sitting up with eyes closed, easily  arousable. Tavarez catheter in place. Photos taken on 11/14/23. Patient has a DTI on right ischium and right posterior thigh from fall at home. Bilateral legs assessed, acewrapped, elevated on pillow. Right lower leg serous drainage soaking through dressing. Dressing removed. Patient has 2 small weeping poorly defined wounds to right lateral and anterior upper lower leg. Cleansed with saline, covered with ABD pad, kerlix and acewrap from ankle to knee. Right toes red, improved from 11/14/23. Bilateral feet warm and edema improved. Patient has red petechia rash to left lower leg, wrapped in acewrap. Ultrasound done, no DVT. Heel borders in place, heels red, blanchable, no issues. Recommend Ammonium lactate lotion. Scattered bruising to Bilateral upper extremities. Patient is on a [facility] bedframe Accumax mattress with Waffle mattress overlay. Per Perry Wiley RN, discussed with Provider, Pallative care has been consulted due to patients EF, noncompliance with meds, and age.  Perry updated, to continue pressure injury prevention interventions, Woundcare, and nursing to continue to follow providers orders. Reconsult Wound RN PRN. Philomena STARK RN     Wound Team Plan: RLE- cleanse with normal saline or soap and water, pat dry, cover weeping areas with ABD pad, kerlix, daily and prn. Acewrap BLE daily. Offload Bilateral heels.   Right Ischium/Posterior Thigh DTI- cleanse with soap and water, pat dry, cover with gauze and Border dressings daily and prn. Continue to offload     Philomena Osorio RN  11/16/2023  1:34 PM

## 2023-11-16 NOTE — NURSING NOTE
Assumed care of patient at this time 1915. At bedside report, patient had no c/o pain. vss and afebrile. Bed low and locked, call button within reach and bed alarm on. Patient has no needs that require immediate nursing interventions.

## 2023-11-16 NOTE — PROGRESS NOTES
Physical Therapy    Physical Therapy Treatment    Patient Name: Angela Ayala  MRN: [id number]  Today's Date: 11/16/2023  Time Calculation  Start Time: 1305  Stop Time: 1322  Time Calculation (min): 17 min       Assessment/Plan   PT Assessment  Rehab Prognosis: Good  Evaluation/Treatment Tolerance: Patient limited by fatigue  End of Session Communication: Bedside nurse  Assessment Comment: Progressing slowly with functional mobility;  tolerance to activity improving slowly;  high fall risk  End of Session Patient Position: Bed, 4 rail up, Alarm on  PT Plan  Inpatient/Swing Bed or Outpatient: Inpatient  PT Plan  Treatment/Interventions: Bed mobility, Transfer training, Gait training, Balance training, Therapeutic exercise, Therapeutic activity, Range of motion, Endurance training, Strengthening, Neuromuscular re-education  PT Plan: Skilled PT  PT Frequency: 5 times per week  PT Discharge Recommendations: Moderate intensity level of continued care  Equipment Recommended upon Discharge: Wheeled walker  PT Recommended Transfer Status: Assist x2  PT - OK to Discharge: Yes (with skilled physical therapy at next level of care.)      General Visit Information:   PT  Visit  PT Received On: 11/16/23  General  Co-Treatment: OT  Co-Treatment Reason: Need for 2nd skilled person for therpeutic handling of patient.  Prior to Session Communication: Bedside nurse  Patient Position Received: Bed, 4 rail up, Alarm on  General Comment: RN cleared patient for treatment.  Patient reports agreeable to treatment.    Subjective   Precautions:  Precautions  Medical Precautions: Fall precautions  Vital Signs:  Vital Signs  BP: 84/69    Objective   Pain:  Pain Assessment  Pain Assessment: 0-10  Pain Score: 0 - No pain          Activity Tolerance:  Activity Tolerance  Endurance: Endurance does not limit participation in activity (low BP)  Treatments:                 Bed Mobility  Bed Mobility: Yes  Bed Mobility 1  Bed Mobility 1: Supine to  sitting  Level of Assistance 1: Maximum assistance (x 2)  Bed Mobility Comments 1: Assist with trunk and lobito LE during supine to sit;  max assist to scoot hips to edge of bed during supine to sit.  Bed Mobility 2  Bed Mobility  2: Sitting to supine  Level of Assistance 2: Maximum assistance (x 2)  Bed Mobility Comments 2: Assist with trunk and lobito LE during sit to supine.    Ambulation/Gait Training  Ambulation/Gait Training Performed: No  Transfers  Transfer: Yes  Transfer 1  Transfer From 1: Sit to  Transfer to 1: Stand  Technique 1: Sit to stand  Transfer Device 1: Walker  Transfer Level of Assistance 1: Maximum assistance  Trials/Comments 1: x 2 reps;  assist with elevating buttocks from sitting surface and positioning COG over RADHA during sit to stand.  Transfers 2  Transfer From 2: Stand to  Transfer to 2: Sit  Technique 2: Stand to sit  Transfer Device 2: Walker  Transfer Level of Assistance 2: Maximum assistance  Trials/Comments 2: Assist with trunk support and lobito LE during sit to supine.    Stairs  Stairs: No    Other Activity  Other Activity Performed: Yes  Other Activity 1: Static standing with rolling walker and mod assist x 2 for trunk support;  patient had some difficulty with supporting body weight with left LE in standing.    Outcome Measures:       Education Documentation  No documentation found.  Education Comments  No comments found.        OP EDUCATION:       Encounter Problems       Encounter Problems (Active)       Balance       STG - Maintains static standing balance with upper extremity support and min assist x 1. (Progressing)       Start:  11/15/23    Expected End:  11/29/23            STG - Maintains static sitting balance without upper extremity support with independence. (Progressing)       Start:  11/15/23    Expected End:  11/29/23               Mobility       perform functional mobility to and from the bathroom with 2ww and minimal assist.  (Progressing)       Start:  11/15/23     Expected End:  12/15/23               Mobility       STG - Patient will ambulate 25' with rolling walker and min assist x 1. (Progressing)       Start:  11/15/23    Expected End:  11/29/23               Pain - Adult          Safety       LTG - Patient will demonstrate safety requirements appropriate to situation/environment (Progressing)       Start:  11/15/23    Expected End:  11/29/23               Transfers       LTG - Patient will  perform all functional transfers with close supervision and 2ww (Progressing)       Start:  11/15/23    Expected End:  12/15/23               Transfers       STG - Patient to transfer to and from sit to supine with modified independence. (Progressing)       Start:  11/15/23    Expected End:  11/29/23            STG - Patient will transfer sit to and from stand with min assist x 1. (Progressing)       Start:  11/15/23    Expected End:  11/29/23

## 2023-11-16 NOTE — NURSING NOTE
Pt'radhaly was in to visit bp is normal and elevated Bp 122/91 hr 83,afib on telemetry.In good mood laughing and talking with family.

## 2023-11-16 NOTE — PROGRESS NOTES
Angela Ayala is a 89 y.o. female on day 2 of admission presenting with Acute on chronic diastolic congestive heart failure (CMS/HCC).    TCC spoke to son Gus, gave choices for [facility] and [facility]. States they have not decided but would like referrals sent. States they are considering home with [facility] but have not made a final decision. TCC will send referrals and follow for needs    Hayde Rosario RN

## 2023-11-16 NOTE — PROGRESS NOTES
Subjective Data:  Patient feeling fairly well without any shortness of breath    Overnight Events:    Borderline hypotension as discussed below     Objective Data:  Last Recorded Vitals:  Vitals:    11/16/23 0208 11/16/23 0210 11/16/23 0422 11/16/23 0852   BP: (!) 81/48 76/51 90/65 93/79   BP Location:   Right leg Left arm   Patient Position:   Lying Sitting   Pulse: 78 81 81 84   Resp: 12 12 16 17   Temp:   35.2 °C (95.4 °F) 36.8 °C (98.2 °F)   TempSrc:   Temporal Oral   SpO2: 99% 99% 97% 96%   Weight:       Height:           Last Labs:  CBC - 11/16/2023:  4:50 AM  9.5 14.5 213    45.8      CMP - 11/16/2023:  4:50 AM  8.7 5.8 20 --- 1.4   4.5 2.3 31 109      PTT - 9/25/2023:  2:08 AM  1.1   11.4 25.4     HGBA1C   Date/Time Value Ref Range Status   11/15/2023 06:57 AM 6.8 See below % Final   07/06/2021 12:08 PM 6.0 % Final     Comment:          Diagnosis of Diabetes-Adults   Non-Diabetic: < or = 5.6%   Increased risk for developing diabetes: 5.7-6.4%   Diagnostic of diabetes: > or = 6.5%  .       Monitoring of Diabetes                Age (y)     Therapeutic Goal (%)   Adults:          >18           <7.0   Pediatrics:    13-18           <7.5                   7-12           <8.0                   0- 6            7.5-8.5   American Diabetes Association. Diabetes Care 33(S1), Jan 2010.     10/15/2020 09:37 AM 6.1 % Final     Comment:          Diagnosis of Diabetes-Adults   Non-Diabetic: < or = 5.6%   Increased risk for developing diabetes: 5.7-6.4%   Diagnostic of diabetes: > or = 6.5%  .       Monitoring of Diabetes                Age (y)     Therapeutic Goal (%)   Adults:          >18           <7.0   Pediatrics:    13-18           <7.5                   7-12           <8.0                   0- 6            7.5-8.5   American Diabetes Association. Diabetes Care 33(S1), Jan 2010.       LDLCALC   Date/Time Value Ref Range Status   09/26/2023 06:03 AM 45 65 - 130 MG/DL Final     VLDL   Date/Time Value Ref Range Status  "  04/18/2022 02:10 PM 14 0 - 40 mg/dL Final   07/06/2021 12:08 PM 21 0 - 40 mg/dL Final   10/15/2020 09:37 AM 20 0 - 40 mg/dL Final      Last I/O:  I/O last 3 completed shifts:  In: 1900 (21.3 mL/kg) [P.O.:600; I.V.:200 (2.2 mL/kg); IV Piggyback:1100]  Out: 840 (9.4 mL/kg) [Urine:840 (0.3 mL/kg/hr)]  Weight: 89 kg     Past Cardiology Tests (Last 3 Years):  EKG:  Electrocardiogram, 12-lead PRN ACS symptoms 11/15/2023    Echo:  No results found for this or any previous visit from the past 1095 days.    Ejection Fractions:  No results found for: \"EF\"  Cath:  No results found for this or any previous visit from the past 1095 days.    Stress Test:  No results found for this or any previous visit from the past 1095 days.    Cardiac Imaging:  No results found for this or any previous visit from the past 1095 days.      Inpatient Medications:  Scheduled medications   Medication Dose Route Frequency    amiodarone  200 mg oral Daily    apixaban  2.5 mg oral BID    aspirin  81 mg oral Daily    atorvastatin  40 mg oral Nightly    cefTRIAXone  1 g intravenous q24h    digoxin  125 mcg oral Every other day    dorzolamide-timoloL  1 drop Both Eyes Nightly    ferrous sulfate (325 mg ferrous sulfate)  1 tablet oral Daily with breakfast    insulin lispro  0-5 Units subcutaneous TID with meals    latanoprost  1 drop Both Eyes Nightly    midodrine  5 mg oral q8h    pantoprazole  40 mg oral Daily     PRN medications   Medication    acetaminophen    Or    acetaminophen    Or    acetaminophen    bisacodyl    dextrose 10 % in water (D10W)    dextrose    glucagon    oxygen     Continuous Medications   Medication Dose Last Rate       Physical Exam:  The patient is a somewhat well appearing moderately overweight but upper elderly white female sitting upright in bed without any respiratory distress on nasal cannula.  JVP not elevated carotid and pulses 2+ no palpable thyroid enlargement  Chest fair air movement breath sounds clear somewhat " diminished  Cardiac rhythm irregularly irregular moderate variability normal S1-S2 minimal systolic murmur no gallop.  Abdomen is soft obese nontender no paraspinal megaly  This 1-2+ lower extremity edema.  There is venous congestion purplish discoloration of the toes bilaterally.     Assessment/Plan   11/15: This 89-year-old white female has a past history including hypertension, hyperlipidemia, left bundle branch block conduction delay, suspected coronary artery disease, low-grade carotid vascular disease, remote right hemispheric CVA.  She was detected as having new onset atrial fibrillation in 8/2023 started on metoprolol and Eliquis.  She was admitted with atypical chest pain and 9/2023 possible non-STEMI from demand ischemia.  Echocardiogram at that time showed a significant reduction in the patient's LV ejection fraction to 25-30% as a new finding with both atria being severely enlarged.  She was noted to have a left bundle branch block conduction delay.  She was thought not to be a good candidate for invasive strategy including catheterization biventricular ICD.  She had poor at that point was continued amiodarone and metoprolol for treatment of the atrial fibrillation plus eliquis anticoagulation.  She did have some chronic kidney disease with baseline creatinine 1.5.  Patient currently admitted after mechanical fall initially had a rapid ventricular rate to the atrial fibrillation for which her metoprolol was increased.  Blood pressures however have been relatively low and will need to be judicious with the metoprolol.  Patient has been seen by vascular surgery for suspected venous less likely arterial peripheral disease results of studies are pending.     11/16: The patient appears relatively well awake alert conversant no respiratory distress on her O2 nasal cannula.  Systolic blood pressures decreased into the 80s+ mmHg range yesterday evening and she was given 1000 normal saline fluid bolus.  The  atrial fibrillation presently has a ventricular rate in the 90/min range.  She was started on low-dose midodrine 5 mg 3 times daily for her hypotension.  The patient at this point will be taken off of metoprolol at least temporarily due to borderline hypotension.  Will utilize amiodarone for ventricular rate control along with very low-dose digoxin on alternate days in view of her amiodarone therapy and mild chronic kidney disease.  Her peripheral edema appears improved with Ace wraps and leg elevation.  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  The lower extremity ultrasound was negative for DVT.  Renal parameters are stable with a creatinine 1.7.  Peripheral IV 11/15/23 20 G Left;Proximal;Anterior Antecubital (Active)   Site Assessment Clean;Dry;Intact 11/16/23 0804   Line Status Blood return noted 11/16/23 0804   Number of days: 1       Urethral Catheter (Active)   Site Assessment Clean;Skin intact 11/16/23 0805   Collection Container Standard drainage bag 11/16/23 0805   Securement Method Securing device (Describe) 11/16/23 0805   Reason for Continuing Urinary Catheterization accurate hourly measurement of urine volume in a critically ill patient that cannot be assessed by other volumes and urine collection strategies 11/16/23 0805   Output (mL) 75 mL 11/16/23 0805   Number of days: 2       Code Status:  DNR and No Intubation    I spent 30 minutes in the professional and overall care of this patient.        Kwame Johnson MD

## 2023-11-16 NOTE — CARE PLAN
The patient's goals for the shift include Bp stabilized    The clinical goals for the shift include Maintain stable vital signs    Over the shift, the patient did not make progress toward the following goals. Barriers to progression include   Problem: Chronic Conditions and Co-morbidities  Goal: Patient's chronic conditions and co-morbidity symptoms are monitored and maintained or improved  Outcome: Progressing     Problem: Fall/Injury  Goal: Not fall by end of shift  Outcome: Progressing     Problem: Fall/Injury  Goal: Be free from injury by end of the shift  Outcome: Progressing   . Recommendations to address these barriers include   Problem: Fall/Injury  Goal: Verbalize understanding of risk factor reduction measures to prevent injury from fall in the home  Outcome: Progressing

## 2023-11-16 NOTE — NURSING NOTE
Pt was anointed by Father all Lopez this am.Palliative care consult placed this am.Pt started Digoxin 0.125mcg po as ordered by Dr Johnson this am,Metoprolol po d/cd.

## 2023-11-16 NOTE — CARE PLAN
The clinical goals for the shift include Maintain stable vital signs    Over the shift, the patient did   Problem: Skin  Goal: Decreased wound size/increased tissue granulation at next dressing change  Outcome: Progressing  Flowsheets (Taken 11/16/2023 0311)  Decreased wound size/increased tissue granulation at next dressing change:   Promote sleep for wound healing   Protective dressings over bony prominences  Goal: Participates in plan/prevention/treatment measures  Outcome: Progressing  Flowsheets (Taken 11/16/2023 0311)  Participates in plan/prevention/treatment measures:   Elevate heels   Increase activity/out of bed for meals  Goal: Prevent/manage excess moisture  Outcome: Progressing  Flowsheets (Taken 11/16/2023 0311)  Prevent/manage excess moisture:   Cleanse incontinence/protect with barrier cream   Moisturize dry skin   Follow provider orders for dressing changes  Goal: Prevent/minimize sheer/friction injuries  Outcome: Progressing  Flowsheets (Taken 11/16/2023 0311)  Prevent/minimize sheer/friction injuries:   Complete micro-shifts as needed if patient unable. Adjust patient position to relieve pressure points, not a full turn   HOB 30 degrees or less   Increase activity/out of bed for meals   Turn/reposition every 2 hours/use positioning/transfer devices   Use pull sheet  Goal: Promote/optimize nutrition  Outcome: Progressing  Flowsheets (Taken 11/16/2023 0311)  Promote/optimize nutrition:   Assist with feeding   Consume > 50% meals/supplements   Monitor/record intake including meals  Goal: Promote skin healing  Outcome: Progressing  Flowsheets (Taken 11/16/2023 0311)  Promote skin healing:   Assess skin/pad under line(s)/device(s)   Ensure correct size (line/device) and apply per  instructions   Protective dressings over bony prominences   Rotate device position/do not position patient on device   Turn/reposition every 2 hours/use positioning/transfer devices   make progress toward the  following goals.

## 2023-11-16 NOTE — NURSING NOTE
"Pt's son was in to visit updated with plan of care.Pt is sleeping comfortably @ present,afib with controlled rate on telemetry,b/l le weeping with pitting edema b/l Ace wrapped and elevated on pillow,pt refuses to be repositioned and to be turned q2H,states\"I am comfortable the way I am.\">pt is on fall and skin preventions.  "

## 2023-11-17 LAB
ALBUMIN SERPL-MCNC: 2.1 G/DL (ref 3.5–5)
ANION GAP SERPL CALC-SCNC: 10 MMOL/L
BACTERIA UR CULT: ABNORMAL
BUN SERPL-MCNC: 61 MG/DL (ref 8–25)
CALCIUM SERPL-MCNC: 8.1 MG/DL (ref 8.5–10.4)
CHLORIDE SERPL-SCNC: 96 MMOL/L (ref 97–107)
CO2 SERPL-SCNC: 25 MMOL/L (ref 24–31)
CREAT SERPL-MCNC: 1.6 MG/DL (ref 0.4–1.6)
GFR SERPL CREATININE-BSD FRML MDRD: 30 ML/MIN/1.73M*2
GLUCOSE SERPL-MCNC: 111 MG/DL (ref 65–99)
PHOSPHATE SERPL-MCNC: 3.8 MG/DL (ref 2.5–4.5)
POTASSIUM SERPL-SCNC: 4.8 MMOL/L (ref 3.4–5.1)
SODIUM SERPL-SCNC: 131 MMOL/L (ref 133–145)

## 2023-11-17 PROCEDURE — 99232 SBSQ HOSP IP/OBS MODERATE 35: CPT | Performed by: INTERNAL MEDICINE

## 2023-11-17 PROCEDURE — 99233 SBSQ HOSP IP/OBS HIGH 50: CPT | Performed by: NURSE PRACTITIONER

## 2023-11-17 PROCEDURE — 80069 RENAL FUNCTION PANEL: CPT | Performed by: HOSPITALIST

## 2023-11-17 PROCEDURE — 2500000001 HC RX 250 WO HCPCS SELF ADMINISTERED DRUGS (ALT 637 FOR MEDICARE OP): Performed by: INTERNAL MEDICINE

## 2023-11-17 PROCEDURE — 2500000004 HC RX 250 GENERAL PHARMACY W/ HCPCS (ALT 636 FOR OP/ED): Performed by: NURSE PRACTITIONER

## 2023-11-17 PROCEDURE — 2500000004 HC RX 250 GENERAL PHARMACY W/ HCPCS (ALT 636 FOR OP/ED): Performed by: INTERNAL MEDICINE

## 2023-11-17 PROCEDURE — 36415 COLL VENOUS BLD VENIPUNCTURE: CPT | Performed by: HOSPITALIST

## 2023-11-17 PROCEDURE — 2500000002 HC RX 250 W HCPCS SELF ADMINISTERED DRUGS (ALT 637 FOR MEDICARE OP, ALT 636 FOR OP/ED): Performed by: NURSE PRACTITIONER

## 2023-11-17 PROCEDURE — 2500000001 HC RX 250 WO HCPCS SELF ADMINISTERED DRUGS (ALT 637 FOR MEDICARE OP): Performed by: NURSE PRACTITIONER

## 2023-11-17 PROCEDURE — 2060000001 HC INTERMEDIATE ICU ROOM DAILY

## 2023-11-17 RX ORDER — MIDODRINE HYDROCHLORIDE 10 MG/1
10 TABLET ORAL
Status: DISCONTINUED | OUTPATIENT
Start: 2023-11-17 | End: 2023-11-18 | Stop reason: HOSPADM

## 2023-11-17 RX ORDER — MIDODRINE HYDROCHLORIDE 10 MG/1
10 TABLET ORAL EVERY 8 HOURS
Status: DISCONTINUED | OUTPATIENT
Start: 2023-11-17 | End: 2023-11-17

## 2023-11-17 RX ADMIN — AMIODARONE HYDROCHLORIDE 200 MG: 200 TABLET ORAL at 09:23

## 2023-11-17 RX ADMIN — CEFTRIAXONE SODIUM 1 G: 1 INJECTION, SOLUTION INTRAVENOUS at 04:43

## 2023-11-17 RX ADMIN — MIDODRINE HYDROCHLORIDE 10 MG: 10 TABLET ORAL at 16:31

## 2023-11-17 RX ADMIN — Medication 1 APPLICATION: at 21:38

## 2023-11-17 RX ADMIN — FERROUS SULFATE TAB 325 MG (65 MG ELEMENTAL FE) 325 MG: 325 (65 FE) TAB at 09:22

## 2023-11-17 RX ADMIN — LATANOPROST 1 DROP: 50 SOLUTION OPHTHALMIC at 21:38

## 2023-11-17 RX ADMIN — ACETAMINOPHEN 650 MG: 325 TABLET ORAL at 16:00

## 2023-11-17 RX ADMIN — PANTOPRAZOLE SODIUM 40 MG: 40 TABLET, DELAYED RELEASE ORAL at 09:23

## 2023-11-17 RX ADMIN — ATORVASTATIN CALCIUM 40 MG: 40 TABLET, FILM COATED ORAL at 21:37

## 2023-11-17 RX ADMIN — DORZOLAMIDE HYDROCHLORIDE AND TIMOLOL MALEATE 1 DROP: 20; 5 SOLUTION/ DROPS OPHTHALMIC at 21:38

## 2023-11-17 RX ADMIN — ASPIRIN 81 MG: 81 TABLET, COATED ORAL at 09:24

## 2023-11-17 RX ADMIN — MIDODRINE HYDROCHLORIDE 5 MG: 5 TABLET ORAL at 00:01

## 2023-11-17 RX ADMIN — APIXABAN 2.5 MG: 2.5 TABLET, FILM COATED ORAL at 09:24

## 2023-11-17 RX ADMIN — APIXABAN 2.5 MG: 2.5 TABLET, FILM COATED ORAL at 21:37

## 2023-11-17 ASSESSMENT — ENCOUNTER SYMPTOMS
COLOR CHANGE: 0
DIFFICULTY URINATING: 0
LIGHT-HEADEDNESS: 0
WOUND: 0
CONSTIPATION: 0
APPETITE CHANGE: 1
NERVOUS/ANXIOUS: 0
FATIGUE: 1
TREMORS: 0
POLYDIPSIA: 0
SEIZURES: 0
DIZZINESS: 0
NUMBNESS: 0
BACK PAIN: 0
CONFUSION: 0
HEADACHES: 0
VOMITING: 0
MYALGIAS: 0
POLYPHAGIA: 0
SLEEP DISTURBANCE: 0
SORE THROAT: 0
COUGH: 0
NAUSEA: 0
ACTIVITY CHANGE: 0
WEAKNESS: 1
DIARRHEA: 0
SINUS PAIN: 0
EYE PAIN: 0
CHILLS: 0
PALPITATIONS: 0
SHORTNESS OF BREATH: 1
FEVER: 0
ARTHRALGIAS: 0
ABDOMINAL PAIN: 0
ABDOMINAL DISTENTION: 0
HEMATURIA: 0

## 2023-11-17 ASSESSMENT — COGNITIVE AND FUNCTIONAL STATUS - GENERAL
TOILETING: TOTAL
DRESSING REGULAR UPPER BODY CLOTHING: A LOT
DRESSING REGULAR LOWER BODY CLOTHING: A LOT
MOVING FROM LYING ON BACK TO SITTING ON SIDE OF FLAT BED WITH BEDRAILS: TOTAL
DRESSING REGULAR LOWER BODY CLOTHING: TOTAL
TURNING FROM BACK TO SIDE WHILE IN FLAT BAD: A LOT
CLIMB 3 TO 5 STEPS WITH RAILING: TOTAL
DRESSING REGULAR UPPER BODY CLOTHING: A LOT
WALKING IN HOSPITAL ROOM: TOTAL
WALKING IN HOSPITAL ROOM: TOTAL
HELP NEEDED FOR BATHING: TOTAL
HELP NEEDED FOR BATHING: TOTAL
MOVING TO AND FROM BED TO CHAIR: TOTAL
STANDING UP FROM CHAIR USING ARMS: TOTAL
DAILY ACTIVITIY SCORE: 11
DAILY ACTIVITIY SCORE: 11
TURNING FROM BACK TO SIDE WHILE IN FLAT BAD: A LOT
MOVING FROM LYING ON BACK TO SITTING ON SIDE OF FLAT BED WITH BEDRAILS: TOTAL
PERSONAL GROOMING: A LOT
MOBILITY SCORE: 7
CLIMB 3 TO 5 STEPS WITH RAILING: TOTAL
MOBILITY SCORE: 7
STANDING UP FROM CHAIR USING ARMS: TOTAL
TOILETING: A LOT
EATING MEALS: A LITTLE
EATING MEALS: A LITTLE
MOVING TO AND FROM BED TO CHAIR: TOTAL
PERSONAL GROOMING: A LOT

## 2023-11-17 ASSESSMENT — PAIN SCALES - GENERAL
PAINLEVEL_OUTOF10: 5 - MODERATE PAIN
PAINLEVEL_OUTOF10: 1
PAINLEVEL_OUTOF10: 0 - NO PAIN
PAINLEVEL_OUTOF10: 0 - NO PAIN

## 2023-11-17 ASSESSMENT — ACTIVITIES OF DAILY LIVING (ADL): EFFECT OF PAIN ON DAILY ACTIVITIES: UNABLE TO WALK

## 2023-11-17 ASSESSMENT — PAIN - FUNCTIONAL ASSESSMENT
PAIN_FUNCTIONAL_ASSESSMENT: 0-10

## 2023-11-17 ASSESSMENT — PAIN SCALES - WONG BAKER: WONGBAKER_NUMERICALRESPONSE: HURTS LITTLE MORE

## 2023-11-17 ASSESSMENT — PAIN DESCRIPTION - LOCATION: LOCATION: LEG

## 2023-11-17 ASSESSMENT — PAIN DESCRIPTION - ORIENTATION: ORIENTATION: RIGHT;LEFT;ANTERIOR

## 2023-11-17 NOTE — PROGRESS NOTES
CONSULT PROGRESS NOTES    SERVICE DATE: 11/17/2023   SERVICE TIME: 3:16 PM    CONSULTING SERVICE: Nephrology    ASSESSMENT AND PLAN   89 a woman with numerous medical problems admitted with fluid overload, being found down, hyperkalemia, significant coronary disease.  1.  Hyperkalemia  2.  Fluid overload  3.  Edema  4.  Hypotension  5.  Chronic kidney disease stage IIIb     The hyperkalemia at admission has resolved.  She required IV diuretic therapy to reduce this.  She unfortunately is very fluid overloaded with significant peripheral edema but sustained hypotension.  The creatinine is at her baseline of 1.5-1.6 range.  I am not sure were offering her much good with ongoing loop diuretic therapy, particular given her ongoing hypotension.  That being said, as her creatinine improved overnight with holding of diuretics yesterday, I have no objection to the usage of a low-dose loop diuretic, favor furosemide 40 mg p.o. daily as needed for swelling or dyspnea.  Unfortunately, hypotension remains problematic.  Case discussed with Megha Wright CNP.  No objection to leave Tavarez catheter in at discharge for comfort measures.  Dr. Amaral is available this weekend.  Please call with questions.    SUBJECTIVE  INTERVAL HPI: She is pursuing hospice care.  Numerous family members present at bedside.  She has no new complaints.    MEDICATIONS:  amiodarone, 200 mg, oral, Daily  ammonium lactate, 1 Application, Topical, BID  apixaban, 2.5 mg, oral, BID  aspirin, 81 mg, oral, Daily  atorvastatin, 40 mg, oral, Nightly  cefTRIAXone, 1 g, intravenous, q24h  digoxin, 125 mcg, oral, Every other day  dorzolamide-timoloL, 1 drop, Both Eyes, Nightly  ferrous sulfate (325 mg ferrous sulfate), 1 tablet, oral, Daily with breakfast  latanoprost, 1 drop, Both Eyes, Nightly  midodrine, 10 mg, oral, TID with meals  pantoprazole, 40 mg, oral, Daily           PRN medications: acetaminophen **OR** acetaminophen **OR** acetaminophen, bisacodyl,  dextrose 10 % in water (D10W), dextrose, glucagon, oxygen     OBJECTIVE  PHYSICAL EXAM:   Heart Rate:  []   Temp:  [36.2 °C (97.2 °F)-36.5 °C (97.7 °F)]   Resp:  [16-22]   BP: ()/(49-91)   SpO2:  [91 %-98 %]   Body mass index is 34.76 kg/m².  This is an obese and elderly white woman who appears chronically ill  Confused affect  No obvious skin rashes, pale skin  Hearing intact  Phonation intact  Moist mucosa  Normal S1/normal S2  Lungs are clear to auscultation bilaterally  Abdomen is soft, nondistended, nontender, positive bowel sounds  Tavarez catheter in place, no suprapubic tenderness to palpation  She has 4 extremity edema, lower extremity edema extends up past her knees into her thighs  Moves 4 limbs spontaneously  No obvious joint deformities  No lymphadenopathy    DATA:   Labs:  Results for orders placed or performed during the hospital encounter of 11/14/23 (from the past 96 hour(s))   CBC and Auto Differential   Result Value Ref Range    WBC 5.6 4.4 - 11.3 x10*3/uL    nRBC 0.0 0.0 - 0.0 /100 WBCs    RBC 5.46 (H) 4.00 - 5.20 x10*6/uL    Hemoglobin 16.1 (H) 12.0 - 16.0 g/dL    Hematocrit 51.0 (H) 36.0 - 46.0 %    MCV 93 80 - 100 fL    MCH 29.5 26.0 - 34.0 pg    MCHC 31.6 (L) 32.0 - 36.0 g/dL    RDW 21.0 (H) 11.5 - 14.5 %    Platelets 275 150 - 450 x10*3/uL    Immature Granulocytes %, Automated 0.0 0.0 - 0.9 %    Immature Granulocytes Absolute, Automated 0.00 0.00 - 0.50 x10*3/uL   Comprehensive metabolic panel   Result Value Ref Range    Glucose 190 (H) 65 - 99 mg/dL    Sodium 138 133 - 145 mmol/L    Potassium 6.1 (HH) 3.4 - 5.1 mmol/L    Chloride 100 97 - 107 mmol/L    Bicarbonate 24 24 - 31 mmol/L    Urea Nitrogen 53 (H) 8 - 25 mg/dL    Creatinine 1.70 (H) 0.40 - 1.60 mg/dL    eGFR 28 (L) >60 mL/min/1.73m*2    Calcium 9.4 8.5 - 10.4 mg/dL    Albumin 3.2 (L) 3.5 - 5.0 g/dL    Alkaline Phosphatase 109 35 - 125 U/L    Total Protein 5.8 (L) 5.9 - 7.9 g/dL    AST 20 5 - 40 U/L    Bilirubin, Total 1.4  (H) 0.1 - 1.2 mg/dL    ALT 31 5 - 40 U/L    Anion Gap 14 <=19 mmol/L   NT Pro-BNP   Result Value Ref Range    PROBNP 22,012 (H) 0 - 624 pg/mL   Serial Troponin, Initial ([facility])   Result Value Ref Range    Troponin T, High Sensitivity 30 (H) <=15 ng/L   Manual Differential   Result Value Ref Range    Neutrophils %, Manual 87.0 40.0 - 80.0 %    Bands %, Manual 3.0 0.0 - 5.0 %    Lymphocytes %, Manual 6.0 13.0 - 44.0 %    Monocytes %, Manual 4.0 2.0 - 10.0 %    Eosinophils %, Manual 0.0 0.0 - 6.0 %    Basophils %, Manual 0.0 0.0 - 2.0 %    Seg Neutrophils Absolute, Manual 4.87 1.60 - 5.00 x10*3/uL    Bands Absolute, Manual 0.17 0.00 - 0.50 x10*3/uL    Lymphocytes Absolute, Manual 0.34 (L) 0.80 - 3.00 x10*3/uL    Monocytes Absolute, Manual 0.22 0.05 - 0.80 x10*3/uL    Eosinophils Absolute, Manual 0.00 0.00 - 0.40 x10*3/uL    Basophils Absolute, Manual 0.00 0.00 - 0.10 x10*3/uL    Total Cells Counted 100     Neutrophils Absolute, Manual 5.04 1.60 - 5.50 x10*3/uL    RBC Morphology See Below     RBC Fragments Few     Ovalocytes Few     Kaiden Cells Many     Acanthocytes Many    Creatine Kinase   Result Value Ref Range    Creatine Kinase 37 24 - 195 U/L   Blood Gas Arterial Full Panel   Result Value Ref Range    POCT pH, Arterial 7.44 (H) 7.38 - 7.42 pH    POCT pCO2, Arterial 33 (L) 38 - 42 mm Hg    POCT pO2, Arterial 422 (H) 85 - 95 mm Hg    POCT SO2, Arterial 100 94 - 100 %    POCT Oxy Hemoglobin, Arterial 97.3 94.0 - 98.0 %    POCT Hematocrit Calculated, Arterial 50.0 (H) 36.0 - 46.0 %    POCT Sodium, Arterial 130 (L) 136 - 145 mmol/L    POCT Potassium, Arterial 5.6 (H) 3.5 - 5.3 mmol/L    POCT Chloride, Arterial 101 98 - 107 mmol/L    POCT Ionized Calcium, Arterial 1.13 1.10 - 1.33 mmol/L    POCT Glucose, Arterial 187 (H) 74 - 99 mg/dL    POCT Lactate, Arterial 3.6 (H) 0.4 - 2.0 mmol/L    POCT Base Excess, Arterial -0.9 -2.0 - 3.0 mmol/L    POCT HCO3 Calculated, Arterial 22.4 22.0 - 26.0 mmol/L    POCT Hemoglobin, Arterial  16.6 (H) 12.0 - 16.0 g/dL    POCT Anion Gap, Arterial 12 10 - 25 mmo/L    Patient Temperature 37.0 degrees Celsius    FiO2 85 %    Apparatus NON REBREATHER     Flow 15.0 LPM   Blood Gas Lactic Acid, Venous   Result Value Ref Range    POCT Lactate, Venous 5.1 (HH) 0.4 - 2.0 mmol/L   Serial Troponin, 2 Hour ([facility])   Result Value Ref Range    Troponin T, High Sensitivity 29 (H) <=15 ng/L   Creatine Kinase   Result Value Ref Range    Creatine Kinase 29 24 - 195 U/L   Creatine Kinase, MB   Result Value Ref Range    CKMB 1.1 <10.0 ng/mL    CK-MB Index 3 %MB OF CK   Troponin T   Result Value Ref Range    Troponin T, High Sensitivity 30 (H) <=15 ng/L   Serial Troponin, 6 Hour ([facility])   Result Value Ref Range    Troponin T, High Sensitivity 34 (H) <=15 ng/L   Basic Metabolic Panel   Result Value Ref Range    Glucose 144 (H) 65 - 99 mg/dL    Sodium 136 133 - 145 mmol/L    Potassium 5.0 3.4 - 5.1 mmol/L    Chloride 96 (L) 97 - 107 mmol/L    Bicarbonate 25 24 - 31 mmol/L    Urea Nitrogen 54 (H) 8 - 25 mg/dL    Creatinine 1.70 (H) 0.40 - 1.60 mg/dL    eGFR 28 (L) >60 mL/min/1.73m*2    Calcium 9.4 8.5 - 10.4 mg/dL    Anion Gap 15 <=19 mmol/L   Troponin T   Result Value Ref Range    Troponin T, High Sensitivity 34 (H) <=15 ng/L   Creatine Kinase   Result Value Ref Range    Creatine Kinase 39 24 - 195 U/L   Urinalysis with Reflex Microscopic and Culture   Result Value Ref Range    Color, Urine Light-Orange (N) Light-Yellow, Yellow, Dark-Yellow    Appearance, Urine Turbid (N) Clear    Specific Gravity, Urine 1.020 1.005 - 1.035    pH, Urine 5.0 5.0, 5.5, 6.0, 6.5, 7.0, 7.5, 8.0    Protein, Urine 20 (TRACE) NEGATIVE, 10 (TRACE), 20 (TRACE) mg/dL    Glucose, Urine Normal Normal mg/dL    Blood, Urine 1.0 (3+) (A) NEGATIVE    Ketones, Urine NEGATIVE NEGATIVE mg/dL    Bilirubin, Urine NEGATIVE NEGATIVE    Urobilinogen, Urine Normal Normal mg/dL    Nitrite, Urine NEGATIVE NEGATIVE    Leukocyte Esterase, Urine 75 Hemanth/µL (A) NEGATIVE   Extra  Urine Gray Tube   Result Value Ref Range    Extra Tube Hold for add-ons.    Microscopic Only, Urine   Result Value Ref Range    WBC, Urine 11-20 (A) 1-5, NONE /HPF    RBC, Urine >20 (A) NONE, 1-2, 3-5 /HPF    Squamous Epithelial Cells, Urine 1-9 (SPARSE) Reference range not established. /HPF    Bacteria, Urine 1+ (A) NONE SEEN /HPF    Mucus, Urine 1+ Reference range not established. /LPF    Hyaline Casts, Urine 4+ (A) NONE /LPF    Fine Granular Casts, Urine 1+ (A) NONE /LPF   Urine Culture    Specimen: Straight Catheter; Urine   Result Value Ref Range    Urine Culture >100,000 Escherichia coli (A)        Susceptibility    Escherichia coli - MICROSCAN     Ampicillin  Resistant      Amoxicillin/Clavulanate  Susceptible      Ampicillin/Sulbactam  Susceptible      Cefazolin  Susceptible      Cefazolin (uncomplicated UTIs only)  Susceptible      Ciprofloxacin  Susceptible      Gentamicin  Susceptible      Nitrofurantoin  Susceptible      Piperacillin/Tazobactam  Susceptible      Trimethoprim/Sulfamethoxazole  Resistant    Magnesium   Result Value Ref Range    Magnesium 2.40 1.60 - 3.10 mg/dL   CBC   Result Value Ref Range    WBC 7.4 4.4 - 11.3 x10*3/uL    nRBC 0.0 0.0 - 0.0 /100 WBCs    RBC 5.08 4.00 - 5.20 x10*6/uL    Hemoglobin 14.8 12.0 - 16.0 g/dL    Hematocrit 47.1 (H) 36.0 - 46.0 %    MCV 93 80 - 100 fL    MCH 29.1 26.0 - 34.0 pg    MCHC 31.4 (L) 32.0 - 36.0 g/dL    RDW 19.9 (H) 11.5 - 14.5 %    Platelets 242 150 - 450 x10*3/uL   Hemoglobin A1C   Result Value Ref Range    Hemoglobin A1C 6.8 (H) See below %    Estimated Average Glucose 148 Not Established mg/dL   Basic Metabolic Panel   Result Value Ref Range    Glucose 120 (H) 65 - 99 mg/dL    Sodium 136 133 - 145 mmol/L    Potassium 5.3 (H) 3.4 - 5.1 mmol/L    Chloride 98 97 - 107 mmol/L    Bicarbonate 23 (L) 24 - 31 mmol/L    Urea Nitrogen 56 (H) 8 - 25 mg/dL    Creatinine 1.70 (H) 0.40 - 1.60 mg/dL    eGFR 28 (L) >60 mL/min/1.73m*2    Calcium 9.2 8.5 - 10.4  mg/dL    Anion Gap 15 <=19 mmol/L   Electrocardiogram, 12-lead PRN ACS symptoms   Result Value Ref Range    Ventricular Rate 116 BPM    QRS Duration 126 ms    QT Interval 352 ms    QTC Calculation(Bazett) 489 ms    R Axis 154 degrees    T Axis -80 degrees    QRS Count 19 beats    Q Onset 216 ms    T Offset 392 ms    QTC Fredericia 438 ms   POCT GLUCOSE   Result Value Ref Range    POCT Glucose 96 74 - 99 mg/dL   Lactic acid, arterial, whole blood   Result Value Ref Range    POCT Lactate, Arterial 1.9 0.4 - 2.0 mmol/L   Renal Function Panel   Result Value Ref Range    Glucose 109 (H) 65 - 99 mg/dL    Sodium 136 133 - 145 mmol/L    Potassium 4.7 3.4 - 5.1 mmol/L    Chloride 99 97 - 107 mmol/L    Bicarbonate 26 24 - 31 mmol/L    Urea Nitrogen 60 (H) 8 - 25 mg/dL    Creatinine 1.70 (H) 0.40 - 1.60 mg/dL    eGFR 28 (L) >60 mL/min/1.73m*2    Calcium 8.7 8.5 - 10.4 mg/dL    Phosphorus 4.5 2.5 - 4.5 mg/dL    Albumin 2.3 (L) 3.5 - 5.0 g/dL    Anion Gap 11 <=19 mmol/L   CBC   Result Value Ref Range    WBC 9.5 4.4 - 11.3 x10*3/uL    nRBC 0.0 0.0 - 0.0 /100 WBCs    RBC 4.95 4.00 - 5.20 x10*6/uL    Hemoglobin 14.5 12.0 - 16.0 g/dL    Hematocrit 45.8 36.0 - 46.0 %    MCV 93 80 - 100 fL    MCH 29.3 26.0 - 34.0 pg    MCHC 31.7 (L) 32.0 - 36.0 g/dL    RDW 19.9 (H) 11.5 - 14.5 %    Platelets 213 150 - 450 x10*3/uL   POCT GLUCOSE   Result Value Ref Range    POCT Glucose 95 74 - 99 mg/dL   POCT GLUCOSE   Result Value Ref Range    POCT Glucose 123 (H) 74 - 99 mg/dL   Renal Function Panel   Result Value Ref Range    Glucose 111 (H) 65 - 99 mg/dL    Sodium 131 (L) 133 - 145 mmol/L    Potassium 4.8 3.4 - 5.1 mmol/L    Chloride 96 (L) 97 - 107 mmol/L    Bicarbonate 25 24 - 31 mmol/L    Urea Nitrogen 61 (H) 8 - 25 mg/dL    Creatinine 1.60 0.40 - 1.60 mg/dL    eGFR 30 (L) >60 mL/min/1.73m*2    Calcium 8.1 (L) 8.5 - 10.4 mg/dL    Phosphorus 3.8 2.5 - 4.5 mg/dL    Albumin 2.1 (L) 3.5 - 5.0 g/dL    Anion Gap 10 <=19 mmol/L         SIGNATURE:  Pierce Duarte MD PATIENT NAME: Angela Ayala   DATE: November 17, 2023 MRN: [id number]   TIME: 3:16 PM PAGER: [phone]

## 2023-11-17 NOTE — CARE PLAN
Problem: Skin  Goal: Prevent/minimize sheer/friction injuries  11/17/2023 0549 by Amy Jasso, RN  Outcome: Progressing  Flowsheets (Taken 11/17/2023 0549)  Prevent/minimize sheer/friction injuries:   Complete micro-shifts as needed if patient unable. Adjust patient position to relieve pressure points, not a full turn   HOB 30 degrees or less   Increase activity/out of bed for meals   Turn/reposition every 2 hours/use positioning/transfer devices   Use pull sheet   Utilize specialty bed per algorithm  11/17/2023 0548 by Amy Jasso, RN  Outcome: Progressing   The patient's goals for the shift include vital sign stable    The clinical goals for the shift include hemodynamic stablily

## 2023-11-17 NOTE — PROGRESS NOTES
Subjective Data:  Patient resting comfortably at present    Overnight Events:    See discussion below     Objective Data:  Last Recorded Vitals:  Vitals:    11/17/23 0500 11/17/23 0737 11/17/23 1220 11/17/23 1500   BP: 93/52 (!) 88/49 99/59 115/83   BP Location:  Right arm Right arm Right arm   Patient Position:  Lying Lying Lying   Pulse:  100 88 93   Resp:  16 16 16   Temp:  36.5 °C (97.7 °F) 36.3 °C (97.3 °F) 36.5 °C (97.7 °F)   TempSrc:  Temporal Axillary Axillary   SpO2:  91% 92% 95%   Weight:       Height:           Last Labs:  CBC - 11/16/2023:  4:50 AM  9.5 14.5 213    45.8      CMP - 11/17/2023:  5:16 AM  8.1 5.8 20 --- 1.4   3.8 2.1 31 109      PTT - 9/25/2023:  2:08 AM  1.1   11.4 25.4     HGBA1C   Date/Time Value Ref Range Status   11/15/2023 06:57 AM 6.8 See below % Final   07/06/2021 12:08 PM 6.0 % Final     Comment:          Diagnosis of Diabetes-Adults   Non-Diabetic: < or = 5.6%   Increased risk for developing diabetes: 5.7-6.4%   Diagnostic of diabetes: > or = 6.5%  .       Monitoring of Diabetes                Age (y)     Therapeutic Goal (%)   Adults:          >18           <7.0   Pediatrics:    13-18           <7.5                   7-12           <8.0                   0- 6            7.5-8.5   American Diabetes Association. Diabetes Care 33(S1), Jan 2010.     10/15/2020 09:37 AM 6.1 % Final     Comment:          Diagnosis of Diabetes-Adults   Non-Diabetic: < or = 5.6%   Increased risk for developing diabetes: 5.7-6.4%   Diagnostic of diabetes: > or = 6.5%  .       Monitoring of Diabetes                Age (y)     Therapeutic Goal (%)   Adults:          >18           <7.0   Pediatrics:    13-18           <7.5                   7-12           <8.0                   0- 6            7.5-8.5   American Diabetes Association. Diabetes Care 33(S1), Jan 2010.       LDLCALC   Date/Time Value Ref Range Status   09/26/2023 06:03 AM 45 65 - 130 MG/DL Final     VLDL   Date/Time Value Ref Range Status  "  04/18/2022 02:10 PM 14 0 - 40 mg/dL Final   07/06/2021 12:08 PM 21 0 - 40 mg/dL Final   10/15/2020 09:37 AM 20 0 - 40 mg/dL Final      Last I/O:  I/O last 3 completed shifts:  In: 1840 (20.7 mL/kg) [P.O.:790; IV Piggyback:1050]  Out: 775 (8.7 mL/kg) [Urine:775 (0.2 mL/kg/hr)]  Weight: 89 kg     Past Cardiology Tests (Last 3 Years):  EKG:  Electrocardiogram, 12-lead PRN ACS symptoms 11/16/2023    Echo:  No results found for this or any previous visit from the past 1095 days.    Ejection Fractions:  No results found for: \"EF\"  Cath:  No results found for this or any previous visit from the past 1095 days.    Stress Test:  No results found for this or any previous visit from the past 1095 days.    Cardiac Imaging:  No results found for this or any previous visit from the past 1095 days.      Inpatient Medications:  Scheduled medications   Medication Dose Route Frequency    amiodarone  200 mg oral Daily    ammonium lactate  1 Application Topical BID    apixaban  2.5 mg oral BID    aspirin  81 mg oral Daily    atorvastatin  40 mg oral Nightly    cefTRIAXone  1 g intravenous q24h    digoxin  125 mcg oral Every other day    dorzolamide-timoloL  1 drop Both Eyes Nightly    ferrous sulfate (325 mg ferrous sulfate)  1 tablet oral Daily with breakfast    latanoprost  1 drop Both Eyes Nightly    midodrine  10 mg oral TID with meals    pantoprazole  40 mg oral Daily     PRN medications   Medication    acetaminophen    Or    acetaminophen    Or    acetaminophen    bisacodyl    dextrose 10 % in water (D10W)    dextrose    glucagon    oxygen     Continuous Medications   Medication Dose Last Rate       Physical Exam:  The patient is a somewhat well appearing moderately overweight but upper elderly white female sitting upright in bed without any respiratory distress on nasal cannula.  JVP not elevated carotid and pulses 2+ no palpable thyroid enlargement  Chest fair air movement breath sounds clear somewhat diminished  Cardiac " rhythm irregularly irregular moderate variability normal S1-S2 minimal systolic murmur no gallop.  Abdomen is soft obese nontender no paraspinal megaly  This 1-2+ lower extremity edema.  There is venous congestion purplish discoloration of the toes bilaterally.     Assessment/Plan   11/15: This 89-year-old white female has a past history including hypertension, hyperlipidemia, left bundle branch block conduction delay, suspected coronary artery disease, low-grade carotid vascular disease, remote right hemispheric CVA.  She was detected as having new onset atrial fibrillation in 8/2023 started on metoprolol and Eliquis.  She was admitted with atypical chest pain and 9/2023 possible non-STEMI from demand ischemia.  Echocardiogram at that time showed a significant reduction in the patient's LV ejection fraction to 25-30% as a new finding with both atria being severely enlarged.  She was noted to have a left bundle branch block conduction delay.  She was thought not to be a good candidate for invasive strategy including catheterization biventricular ICD.  She had poor at that point was continued amiodarone and metoprolol for treatment of the atrial fibrillation plus eliquis anticoagulation.  She did have some chronic kidney disease with baseline creatinine 1.5.  Patient currently admitted after mechanical fall initially had a rapid ventricular rate to the atrial fibrillation for which her metoprolol was increased.  Blood pressures however have been relatively low and will need to be judicious with the metoprolol.  Patient has been seen by vascular surgery for suspected venous less likely arterial peripheral disease results of studies are pending.      11/16: The patient appears relatively well awake alert conversant no respiratory distress on her O2 nasal cannula.  Systolic blood pressures decreased into the 80s+ mmHg range yesterday evening and she was given 1000 normal saline fluid bolus.  The atrial fibrillation  presently has a ventricular rate in the 90/min range.  She was started on low-dose midodrine 5 mg 3 times daily for her hypotension.  The patient at this point will be taken off of metoprolol at least temporarily due to borderline hypotension.  Will utilize amiodarone for ventricular rate control along with very low-dose digoxin on alternate days in view of her amiodarone therapy and mild chronic kidney disease.  Her peripheral edema appears improved with Ace wraps and leg elevation.  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  The lower extremity ultrasound was negative for DVT.  Renal parameters are stable with a creatinine 1.7.    11/17: The patient is presently resting comfortably.  Systolic blood pressures have been ranging approximately between  mmHg although she was somewhat hypotensive during the night.  She is still receiving the midodrine 5 mg 3 times daily.  Toprol-XL was discontinued yesterday.  Atrial fibrillation has a controlled ventricular rate in the 90/min range on amiodarone with low-dose digoxin also started yesterday.  Renal function panel today stable creatinine 1.6 potassium 4.8.  The patient's family did meet with palliative care and hospice and arrangements are being made for the patient to be discharged home tomorrow with hospice care.  At the time of discharge we continue cardiac therapy of amiodarone 200 mg daily and Eliquis 2.5 mg twice daily plus the digoxin 125 mcg every other day plus midodrine which has been actually been increased to 10 mg 3 times daily.  Discontinue atorvastatin and antibiotic therapy if thought required by primary care.  Issues were discussed at length with the patient's son.  Peripheral IV 11/15/23 20 G Left;Proximal;Anterior Antecubital (Active)   Site Assessment Clean;Dry;Intact 11/17/23 0737   Line Status Blood return noted 11/17/23 0737   Dressing Status Clean;Dry;Occlusive 11/17/23  0737   Number of days: 2       Urethral Catheter (Active)   Reason for Continuing Urinary Catheterization comfort for end of life care 11/17/23 1100   Number of days: 3       Code Status:  DNR and No Intubation    I spent 20 minutes in the professional and overall care of this patient.        Kwame Johnson MD

## 2023-11-17 NOTE — NURSING NOTE
Assumed care of pt,awake alert and orientedx3,afib with controlled rate.Pt repositioned on Rt side.Pt on fall and skin preventions.

## 2023-11-17 NOTE — PROGRESS NOTES
Angela Mead is a 89 y.o. female on day 3 of admission presenting with Acute on chronic diastolic congestive heart failure (CMS/HCC).      Subjective   Patient seen and examined. Awake/alert/oriented. Resting in bed. Respirations even, non labored. No s/s of discomfort. Son in at bedside, updated on patient status and plan of care.          Objective     Last Recorded Vitals  BP 99/59 (BP Location: Right arm, Patient Position: Lying)   Pulse 88   Temp 36.3 °C (97.3 °F) (Axillary)   Resp 16   Wt 89 kg (196 lb 3.4 oz)   SpO2 92%   Intake/Output last 3 Shifts:    Intake/Output Summary (Last 24 hours) at 11/17/2023 1359  Last data filed at 11/17/2023 1200  Gross per 24 hour   Intake 850 ml   Output 700 ml   Net 150 ml         Admission Weight  Weight: 89 kg (196 lb 3.4 oz) (11/14/23 1311)    Daily Weight  11/14/23 : 89 kg (196 lb 3.4 oz)    Image Results  Vascular US ankle brachial index (CLARISSA) [place] [address]             Phone [phone]       Vascular Lab Report     [facility] US ANKLE BRACHIAL INDEX (CLARISSA) WITHOUT EXERCISE    Patient Name:      ANGELA MEAD      Reading Physician: [id number] Itzel Carolina MD  Study Date:        11/15/2023          Ordering Provider: [id number] GABE ALVARADO  MRN/PID:           [id number]            Fellow:  Accession#:        [id number]        Technologist:      Marley Mak RVT  Date of Birth/Age: 2/18/1929 / 89      Technologist 2:                     years  Gender:            F                   Encounter#:        [id number]  Admission Status:  Inpatient           Location           [facility]                                         Performed:       Diagnosis/ICD: Peripheral vascular disease, unspecified-I73.9  Indication:    Peripheral vascular disease  CPT Codes:     17940 Peripheral artery CLARISSA Only       CONCLUSIONS:  Right Lower PVR:  Evidence of mild arterial occlusive disease in the right lower extremity at rest. Right pressures of >220 mmHg suggest no compressibility of vessels and may make absolute Segmental Limb Pressures (SLP) unreliable. Decreased digital perfusion noted. Triphasic flow is noted in the right common femoral artery, right posterior tibial artery and right dorsalis pedis artery. Severity of disease called by PVR ankle tracing. Unable to obtain TBI due to flat digital tracing.  Left Lower PVR: Evidence of mild arterial occlusive disease in the left lower extremity at rest. Left pressures of >220 mmHg suggest no compressibility of vessels and may make absolute Segmental Limb Pressures (SLP) unreliable. Decreased digital perfusion noted. Biphasic flow is noted in the left common femoral artery, left posterior tibial artery and left dorsalis pedis artery. Severity of disease called by PVR ankle tracing.     Imaging & Doppler Findings:     RIGHT Lower PVR                Pressures Ratios  Right Posterior Tibial (Ankle) 134 mmHg  1.76  Right Dorsalis Pedis (Ankle)   93 mmHg   1.22          LEFT Lower PVR                Pressures Ratios  Left Posterior Tibial (Ankle) 106 mmHg  1.39  Left Dorsalis Pedis (Ankle)   100 mmHg  1.32  Left Digit (Great Toe)        34 mmHg   0.45                             Right   Left  Brachial Pressure 76 mmHg 76 mmHg          [id number] Itzel Carolina MD  Electronically signed by [id number] Itzel Carolina MD on 11/15/2023 at 6:05:05 PM       ** Final **  Electrocardiogram, 12-lead PRN ACS symptoms  Atrial fibrillation with rapid ventricular response  Left bundle branch block  Abnormal ECG  When compared with ECG of 14-NOV-2023 12:35, (unconfirmed)  No significant change was found  Confirmed by Gus Neely ([id number]) on 11/15/2023 10:27:28 AM  Lower extremity venous duplex bilateral  Narrative: Interpreted By:  Helio Sparrow,   STUDY:  [facility] LOWER EXTREMITY VENOUS DUPLEX BILATERAL;  11/14/2023 6:39 pm       INDICATION:  Signs/Symptoms:BLE edema, LLE pain.      COMPARISON:  10/18/2023      ACCESSION NUMBER(S):  [id number]      ORDERING CLINICIAN:  NEHAL MANZANO      TECHNIQUE:  Vascular ultrasound of the bilateral lower extremities was performed.  Real-time compression views as well as Gray scale, color Doppler and  spectral Doppler waveform analysis was performed.      FINDINGS:  Evaluation of the visualized portions of the bilateral common femoral  vein, proximal, mid, and distal femoral vein, and popliteal vein were  performed.  Evaluation of the visualized portions of the  posterior  tibial and peroneal veins were also performed.          The evaluated veins demonstrate normal compressibility. There is  intact venous flow demonstrating normal respiratory variability and  normal augmentation of flow with calf compression. Therefore, there  is no ultrasonographic evidence for deep vein thrombosis within the  evaluated veins. Limited characterization of the calf veins.          Impression: 1. No DVT visualized portions bilateral lower extremities.      MACRO:  None      Signed by: Helio Sparrow 11/15/2023 8:40 AM  Dictation workstation:   [id number]      Physical Exam  Constitutional:       Appearance: Normal appearance.   HENT:      Head: Normocephalic and atraumatic.   Eyes:      Extraocular Movements: Extraocular movements intact.      Conjunctiva/sclera: Conjunctivae normal.   Cardiovascular:      Rate and Rhythm: Normal rate. Rhythm irregular.   Pulmonary:      Effort: Pulmonary effort is normal.      Breath sounds: No wheezing, rhonchi or rales.   Abdominal:      General: Bowel sounds are normal.      Tenderness: There is no abdominal tenderness.   Musculoskeletal:      Right lower leg: Edema present.      Left lower leg: Edema present.   Skin:     General: Skin is warm and dry.   Neurological:      General: No focal deficit present.      Mental Status: Mental status is at baseline.         Relevant  Results  Lab Results   Component Value Date    GLUCOSE 111 (H) 11/17/2023    CALCIUM 8.1 (L) 11/17/2023     (L) 11/17/2023    K 4.8 11/17/2023    CO2 25 11/17/2023    CL 96 (L) 11/17/2023    BUN 61 (H) 11/17/2023    CREATININE 1.60 11/17/2023     Lab Results   Component Value Date    WBC 9.5 11/16/2023    HGB 14.5 11/16/2023    HCT 45.8 11/16/2023    MCV 93 11/16/2023     11/16/2023     Electrocardiogram, 12-lead PRN ACS symptoms    Result Date: 11/15/2023  Atrial fibrillation with rapid ventricular response Left bundle branch block Abnormal ECG When compared with ECG of 14-NOV-2023 12:35, (unconfirmed) No significant change was found Confirmed by Gus Neely ([id number]) on 11/15/2023 10:27:28 AM    Lower extremity venous duplex bilateral    Result Date: 11/15/2023  Interpreted By:  Helio Sparrow, STUDY: [facility] US LOWER EXTREMITY VENOUS DUPLEX BILATERAL;  11/14/2023 6:39 pm   INDICATION: Signs/Symptoms:BLE edema, LLE pain.   COMPARISON: 10/18/2023   ACCESSION NUMBER(S): [id number]   ORDERING CLINICIAN: NEHAL MANZANO   TECHNIQUE: Vascular ultrasound of the bilateral lower extremities was performed. Real-time compression views as well as Gray scale, color Doppler and spectral Doppler waveform analysis was performed.   FINDINGS: Evaluation of the visualized portions of the bilateral common femoral vein, proximal, mid, and distal femoral vein, and popliteal vein were performed.  Evaluation of the visualized portions of the  posterior tibial and peroneal veins were also performed.     The evaluated veins demonstrate normal compressibility. There is intact venous flow demonstrating normal respiratory variability and normal augmentation of flow with calf compression. Therefore, there is no ultrasonographic evidence for deep vein thrombosis within the evaluated veins. Limited characterization of the calf veins.         1. No DVT visualized portions bilateral lower extremities.   MACRO: None   Signed by:  ANA MARIAjekosarah Sparrow 11/15/2023 8:40 AM Dictation workstation:   [id number]    XR knee left 4+ views    Result Date: 11/14/2023  Interpreted By:  Josue Anand, STUDY: XR KNEE LEFT 4+ VIEWS; 11/14/2023 1:29 pm   INDICATION: Signs/Symptoms:fall.   COMPARISON: None available.   ACCESSION NUMBER(S): [id number]   ORDERING CLINICIAN: MICHAEL COOPER   TECHNIQUE: Views: AP lateral, and oblique views of the left knee.   FINDINGS: There is no evidence for fracture or subluxation. The left total knee prosthesis components are intact. No abnormal lucencies. No evidence for a joint effusion. No abnormal radiopaque foreign body.       Normal appearance of left total knee prosthesis. No evidence for an acute fracture or acute osseous abnormality.   Signed by: Josue Anand 11/14/2023 2:08 PM Dictation workstation:   [id number]    XR chest 1 view    Result Date: 11/14/2023  Interpreted By:  Josue Anand, STUDY: XR CHEST 1 VIEW; 11/14/2023 1:29 pm   INDICATION: Signs/Symptoms:sob.   COMPARISON: 09/27/2023   ACCESSION NUMBER(S): [id number]   ORDERING CLINICIAN: MICHAEL COOPER   TECHNIQUE: 1 view of the chest was performed.   FINDINGS: The cardiomediastinal silhouette is mildly enlarged, unchanged. Small-moderate bilateral pleural effusions, similar to the prior study. Mild prominence of the interstitium suggest congestive changes with probable mild pulmonary edema. Bibasilar airspace opacities presumed atelectasis adjacent to the pleural effusions although pneumonia should be clinically excluded.       Small-moderate bilateral pleural effusions. Bilateral airspace opacity in the lower lobes most likely atelectasis although pneumonia should be clinically excluded. Congestive changes with probable mild pulmonary edema.   Signed by: Josue Anand 11/14/2023 2:02 PM Dictation workstation:   [id number]    CT head wo IV contrast    Result Date: 11/14/2023  Interpreted By:  Josue Anand, STUDY: MICHAEL COOPER; 11/14/2023 1:06 pm    INDICATION: fall;   COMPARISON: None   ACCESSION NUMBER(S): [id number]   ORDERING CLINICIAN: MICHAEL COOPER   TECHNIQUE: Contiguous axial images were acquired from the vertex through the posterior fossa without IV contrast. All CT examinations are performed with 1 or more of the following dose reduction techniques: Automated exposure control, adjustment of mA and/or kv according to patient's size, or use of iterative reconstruction techniques.   FINDINGS: No focal mass effect or midline shift is identified. The ventricles and sulci are symmetric and appropriate for the patient's age.   Moderate degree of nonspecific white matter change most consistent with chronic small-vessel ischemic disease. 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 The gray white matter differentiation is preserved.   No acute intracranial hemorrhage is seen. No intra-axial or extra-axial fluid collection is seen.   The visualized paranasal sinuses and mastoid air cells are clear.       No CT evidence for acute intracranial pathology.   Moderate degree of nonspecific white matter change most consistent with chronic small-vessel ischemic disease.   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   Signed by: Josue Anand 11/14/2023 1:55 PM Dictation workstation:   [id number]    CT cervical spine wo IV contrast    Result Date: 11/14/2023  Interpreted By:  Josue Anand, STUDY: CT CERVICAL SPINE WO IV CONTRAST; 11/14/2023 1:06 pm   INDICATION: Signs/Symptoms:fall;   COMPARISON: None   ACCESSION NUMBER(S): [id number]   ORDERING CLINICIAN: MICHAEL COOPER   TECHNIQUE: Contiguous axial images were acquired from the skull base to the lung apices. Coronal and sagittal reformatted images were obtained. All CT examinations are performed with 1 or more of the following dose reduction techniques: Automated exposure control, adjustment of mA  and/or kv according to patient's size, or use of iterative reconstruction techniques.   FINDINGS: There is a normal cervical lordosis. No acute fracture or traumatic malalignment is identified. Minimal degenerative appearing anterolisthesis of C6 on C7, proximally 2 mm. There is also trace degenerative anterolisthesis of C7 on T1. Facet degenerative changes and uncovertebral joint degenerative changes are present.     The occipital condyles, arch of C1, and the odontoid processes are intact. The atlantoaxial relationship is maintained with degenerative changes and calcified pannus formation.   There is moderate disc space narrowing at C3-4. Severe disc space narrowing at C4-5, C5-6, and C6-7. No evidence for high-grade bony spinal canal stenosis. However there is multilevel high-grade bilateral neural foramina stenosis.   The visualized lung apices are unremarkable.       1. No acute fracture or traumatic malalignment. 2. Degenerative disc disease and spondylosis as described in the body of the report.     Signed by: Josue Anand 11/14/2023 1:53 PM Dictation workstation:   [id number]    Vascular US lower extremity venous duplex bilateral    Result Date: 10/18/2023  Interpreted By:  Ricky Whitehead, STUDY: [facility] US LOWER EXTREMITY VENOUS DUPLEX BILATERAL; 10/18/2023 9:31 pm   INDICATION: Signs/Symptoms:swelling.   COMPARISON: None.   ACCESSION NUMBER(S): [id number]   ORDERING CLINICIAN: KOJO MCCLELLAN   TECHNIQUE: 2D grayscale and color-flow duplex images were obtained along with Doppler spectral waveform analysis of the deep venous system of the lower extremity from the groin to the knee. Attempts were made to image the calf veins. Venous compression and augmentation were performed.   FINDINGS: Right Lower Extremity: There is normal compressibility and flow within the visualized vessels of the deep venous system of this lower extremity.   Left Lower Extremity: There is normal compressibility and flow within  the visualized vessels of the deep venous system of this lower extremity.         No evidence for deep venous thrombosis within the limits of this examination.   Signed by: Ricky Whitehead 10/18/2023 9:41 PM Dictation workstation:   [id number]     CLARISSA: Right Lower PVR: Evidence of mild arterial occlusive disease in the right lower extremity at rest. Right pressures of >220 mmHg suggest no compressibility of vessels and may make absolute Segmental Limb Pressures (SLP) unreliable. Decreased digital perfusion noted. Triphasic flow is noted in the right common femoral artery, right posterior tibial artery and right dorsalis pedis artery. Severity of disease called by PVR ankle tracing. Unable to obtain TBI due to flat digital tracing.  Left Lower PVR: Evidence of mild arterial occlusive disease in the left lower extremity at rest. Left pressures of >220 mmHg suggest no compressibility of vessels and may make absolute Segmental Limb Pressures (SLP) unreliable. Decreased digital perfusion noted. Biphasic flow is noted in the left common femoral artery, left posterior tibial artery and left dorsalis pedis artery. Severity of disease called by PVR ankle tracing.       Assessment/Plan      Principal Problem:    Acute on chronic diastolic congestive heart failure (CMS/HCC)  Active Problems:    Hyperlipidemia    Hypertension    Atrial fibrillation with RVR (CMS/HCC)    Atrial fibrillation with RVR  Noted to be in A fib RVR  Does have a history of A fib, now rate controlled  Continue Eliquis, renally dosed  Continue amiodarone, metoprolol discontinued due to hypotension, started on digoxin by cardiology  Cardiology consulted, appreciate recs  Monitor on tele    Hypotension  Hypotensive overnight with a systolic in the 60s, now in the 80s  Metoprolol discontinued by cardiology  Monitor close  Started on midodrine, increased dose     Hyperkalemia-resolved  Potassium elevated at 6.1, 5.0, 5.2, 4.7  Nephrology  consulted  monitor  On tele     Acute on Chronic Systolic Heart Failure  BNP elevated 67688  9/25/23 Echocardiogram showed EF 25-30%  Follows with Dr. Johnson outpatient  Strict I/O  Daily Weight  Takes Lasix 40mg BID at home, given IV Lasix x one dose in ED, hold further Lasix for now, has been hypotensive and received fluids    Acute UTI  UA positive, culture growing gram negative bacilli  IV ceftriaxone, can transition to oral on discharge  Lactic acid was elevated, repeat normal     Bilateral Lower Extremity Edema  LLE with erythema and warmth  US BLE negative for DVT  Noted purple discolor to toes, ABIs ordered and vascular surgery consulted  CLARISSA results as above  Vascular surgery  recommending light compression, keep toes of the right foot warm, recommending arterial study, son does not want aggressive treatment, vascular to speak with the son if there is a need for CTA.      Fall  Patient fell on Sunday, laid on the floor for 12 hours, there was a concern for rhabdo, CK today 37, 29, 39, will hold off on fluids   Fall precautions  PT/OT     CAD   Continue ASA/statin  Troponin elevated at 30, 29, 30, 34  Monitor on tele  Cardiology is on consult     CKD  Creatinine 1.7, 1.6   Monitor renal function closely  Avoid nephrotoxic medications  Renally dose meds  Nephrology on consult     Hyperlipidemia  Statin     Hyperglycemia/DM II, not previously diagnosed  Glucose 190  Will check hgbA1C, 6.8, diabetes educator consulted, family was educated and son did not feel she needed treatment for her diabetes at the age of 89 per diabetic educator note  Blood glucose has been stable, will stop sliding scale and accuchecks     Plan  Admit to SDU  Monitor on tele  Antiarrhythmics  Started on midodrine, increased dose  Hold diuresis for now  DVT prophylaxis: Eliquis  Cardiology consulted, appreciate recs  PT/OT   CBC and BMP in AM     CODE STATUS: Reviewed with the patient and family. She is a DNRCCA DNI. Patient verbalized  thoughts of dying and feeling tired to RN overnight. Will consult palliative care to discuss goals of care.    Family met with palliative care. Hospice meeting today. Possible discharge home with hospice tomorrow.      Addendum: Spoke with night shift RN from last night. States that the low blood pressure in the 60s was a false reading. She repeated the blood pressure which showed improved blood pressure.               Megha Wright, APRN-CNP

## 2023-11-17 NOTE — NURSING NOTE
Pt and family had Hospice Meeting in room,pt and family agreed  to be discharged tomorrow To home with Hospice care.

## 2023-11-17 NOTE — PROGRESS NOTES
Angela Ayala is a 89 y.o. female on day 3 of admission presenting with respiratory failure, fall.     No complaints of shortness of breath today on room air more edematous, blood pressure dropped again overnight down into the 60s systolic.         Objective     Physical Exam  Vitals and nursing note reviewed.   Constitutional:       General: She is not in acute distress.     Appearance: She is obese. She is ill-appearing.   HENT:      Head: Normocephalic and atraumatic.      Nose: Nose normal.      Mouth/Throat:      Mouth: Mucous membranes are moist.      Pharynx: Oropharynx is clear.   Eyes:      Extraocular Movements: Extraocular movements intact.      Pupils: Pupils are equal, round, and reactive to light.   Cardiovascular:      Rate and Rhythm: Normal rate and regular rhythm.      Pulses: Normal pulses.      Heart sounds: No murmur heard.  Pulmonary:      Effort: Pulmonary effort is normal. No respiratory distress.      Breath sounds: Normal breath sounds.      Comments: Diminished bases, fine crackles bilaterally  Chest:      Chest wall: No tenderness.   Abdominal:      General: Abdomen is flat. Bowel sounds are normal. There is no distension.      Palpations: Abdomen is soft.      Tenderness: There is no abdominal tenderness.   Musculoskeletal:         General: No swelling, tenderness, deformity or signs of injury. Normal range of motion.      Cervical back: Normal range of motion and neck supple.   Skin:     General: Skin is warm and dry.      Capillary Refill: Capillary refill takes 2 to 3 seconds.      Comments: Stasis dermatitis noted to bilateral lower extremities with drainage to both lower extremities, serous.  Lower extremity edema 3+ pitting upper extremity edema 2+   Neurological:      General: No focal deficit present.      Mental Status: She is alert and oriented to person, place, and time.      Motor: Weakness present.      Comments: Assist x2 to sit up at side of the bed   Psychiatric:          "Mood and Affect: Mood normal.         Last Recorded Vitals  BP (!) 88/49 (BP Location: Right arm, Patient Position: Lying)   Pulse 100   Temp 36.5 °C (97.7 °F) (Temporal)   Resp 16   Ht 1.6 m (5' 3\")   Wt 89 kg (196 lb 3.4 oz)   SpO2 91%   BMI 34.76 kg/m²      Intake/Output last 3 Shifts:  I/O last 3 completed shifts:  In: 1840 (20.7 mL/kg) [P.O.:790; IV Piggyback:1050]  Out: 775 (8.7 mL/kg) [Urine:775 (0.2 mL/kg/hr)]  Weight: 89 kg     Relevant Results  Results for orders placed or performed during the hospital encounter of 11/14/23 (from the past 24 hour(s))   Renal Function Panel   Result Value Ref Range    Glucose 111 (H) 65 - 99 mg/dL    Sodium 131 (L) 133 - 145 mmol/L    Potassium 4.8 3.4 - 5.1 mmol/L    Chloride 96 (L) 97 - 107 mmol/L    Bicarbonate 25 24 - 31 mmol/L    Urea Nitrogen 61 (H) 8 - 25 mg/dL    Creatinine 1.60 0.40 - 1.60 mg/dL    eGFR 30 (L) >60 mL/min/1.73m*2    Calcium 8.1 (L) 8.5 - 10.4 mg/dL    Phosphorus 3.8 2.5 - 4.5 mg/dL    Albumin 2.1 (L) 3.5 - 5.0 g/dL    Anion Gap 10 <=19 mmol/L      Vascular US ankle brachial index (CLARISSA) without exercise    Result Date: 11/15/2023           [place] [address]            Phone [phone]  Vascular Lab Report  [facility] US ANKLE BRACHIAL INDEX (CLARISSA) WITHOUT EXERCISE Patient Name:      SHREYAS Mishra Physician: [id number] Itzel Carolina MD Study Date:        11/15/2023          Ordering Provider: [id number] GABE ALVARADO MRN/PID:           [id number]            Fellow: Accession#:        [id number]        Technologist:      Marley Mak RVT Date of Birth/Age: 2/18/1929 / 89      Technologist 2:                    years Gender:            F                   Encounter#:        [id number] Admission Status:  Inpatient           Location           [facility]                                        Performed:  Diagnosis/ICD: Peripheral " vascular disease, unspecified-I73.9 Indication:    Peripheral vascular disease CPT Codes:     02975 Peripheral artery CLARISSA Only  CONCLUSIONS: Right Lower PVR: Evidence of mild arterial occlusive disease in the right lower extremity at rest. Right pressures of >220 mmHg suggest no compressibility of vessels and may make absolute Segmental Limb Pressures (SLP) unreliable. Decreased digital perfusion noted. Triphasic flow is noted in the right common femoral artery, right posterior tibial artery and right dorsalis pedis artery. Severity of disease called by PVR ankle tracing. Unable to obtain TBI due to flat digital tracing. Left Lower PVR: Evidence of mild arterial occlusive disease in the left lower extremity at rest. Left pressures of >220 mmHg suggest no compressibility of vessels and may make absolute Segmental Limb Pressures (SLP) unreliable. Decreased digital perfusion noted. Biphasic flow is noted in the left common femoral artery, left posterior tibial artery and left dorsalis pedis artery. Severity of disease called by PVR ankle tracing.  Imaging & Doppler Findings:  RIGHT Lower PVR                Pressures Ratios Right Posterior Tibial (Ankle) 134 mmHg  1.76 Right Dorsalis Pedis (Ankle)   93 mmHg   1.22   LEFT Lower PVR                Pressures Ratios Left Posterior Tibial (Ankle) 106 mmHg  1.39 Left Dorsalis Pedis (Ankle)   100 mmHg  1.32 Left Digit (Great Toe)        34 mmHg   0.45                      Right   Left Brachial Pressure 76 mmHg 76 mmHg   [id number] Itzel Carolina MD Electronically signed by [id number] Itzel Carolina MD on 11/15/2023 at 6:05:05 PM  ** Final **      Current Facility-Administered Medications   Medication Dose Route Frequency Provider Last Rate Last Admin    acetaminophen (Tylenol) tablet 650 mg  650 mg oral q6h PRN BETH Luis        Or    acetaminophen (Tylenol) oral liquid 650 mg  650 mg nasogastric tube q6h PRN BETH Luis        Or    acetaminophen (Tylenol)  suppository 650 mg  650 mg rectal q6h PRN VIJAYA Luis-CNP        amiodarone (Pacerone) tablet 200 mg  200 mg oral Daily VIJAYA Luis-CNP   200 mg at 11/17/23 0923    ammonium lactate (Lac-Hydrin) 12 % lotion 1 Application  1 Application Topical BID BETH Luis        apixaban (Eliquis) tablet 2.5 mg  2.5 mg oral BID VIJAYA Luis-CNP   2.5 mg at 11/17/23 0924    aspirin EC tablet 81 mg  81 mg oral Daily VIJAYA Luis-CNP   81 mg at 11/17/23 0924    atorvastatin (Lipitor) tablet 40 mg  40 mg oral Nightly VIJAYA Luis-CNP   40 mg at 11/16/23 2108    bisacodyl (Dulcolax) EC tablet 10 mg  10 mg oral Daily PRN BETH Luis        cefTRIAXone (Rocephin) IVPB 1 g  1 g intravenous q24h Dominga Golden MD   Stopped at 11/17/23 0513    dextrose 10 % in water (D10W) infusion  0.3 g/kg/hr intravenous Once PRN BETH Luis        dextrose 50 % injection 25 g  25 g intravenous q15 min PRN BETH Luis        digoxin (Lanoxin) tablet 125 mcg  125 mcg oral Every other day Kwame Johnson MD   125 mcg at 11/16/23 0940    dorzolamide-timoloL (Cosopt) 22.3-6.8 mg/mL ophthalmic solution 1 drop  1 drop Both Eyes Nightly VIJAYA Luis-CNP   1 drop at 11/16/23 2118    ferrous sulfate (325 mg ferrous sulfate) tablet 325 mg  1 tablet oral Daily with breakfast BETH Luis   325 mg at 11/17/23 0922    glucagon (Glucagen) injection 1 mg  1 mg intramuscular q15 min PRN BETH Luis        latanoprost (Xalatan) 0.005 % ophthalmic solution 1 drop  1 drop Both Eyes Nightly VIJAYA Luis-CNP   1 drop at 11/16/23 2108    midodrine (Proamatine) tablet 10 mg  10 mg oral q8h VIJAYA Luis-CNP        oxygen (O2) therapy   inhalation Continuous PRN - O2/gases VIJAYA Luis-CNP   2 L/min at 11/15/23 2000    pantoprazole (ProtoNix) EC tablet 40 mg  40 mg oral Daily Megha Wright,  APRN-CNP   40 mg at 11/17/23 0923        Assessment/Plan   Principal Problem:    Acute on chronic diastolic congestive heart failure (CMS/HCC)  Active Problems:    Hyperlipidemia    Hypertension    Atrial fibrillation with RVR (CMS/HCC)    Acute respiratory failure  Acute on chronic systolic heart failure  Atrial fibrillation with a rapid ventricular rate  [place] on CKD  Fall  UTI  Palliative care     DNR CCA/DNI  Patient is fully capable  Her daughter Deloris is her documented healthcare power of , her son Gus is her alternate.     Meeting conducted yesterday with daughter-in-law son Gus and daughter Deloris as well as with patient, hospice referral was called in the goal at this point is comfort, there was discussion about hospice house versus home with hospice.  Son Gus at bedside this morning, further discussion family has discussed their capabilities, and at this point they would like to take the patient home with hospice, son will move in with the patient full-time to provide care, I did also reach out to the care coordinator and request a list of private pay caregivers as he would like to get additional help into the home, hospice meeting is set up for this afternoon between 1230 and 1.    Appears comfortable, no changes to medications from palliative perspective.    With the attending service, tentative plan at this point to discharge home with hospice when arrangements can be made.     DNR and No Intubation    I spent 45 minutes in the professional and overall care of this patient.      Gale Krishnamurthy, APRN-CNP

## 2023-11-17 NOTE — PROGRESS NOTES
Angela Ayala is a 89 y.o. female on day 3 of admission presenting with Acute on chronic diastolic congestive heart failure (CMS/HCC).    Family to have meeting with hospice today. TCC will follow for discharge needs     1530: Patient to discharge home with hospice tomorrow list of private pay caregivers given to son Gus at this time     **Patient has safe discharge plan**  Hayde Rosario RN

## 2023-11-17 NOTE — NURSING NOTE
"Pt's son Gus visiting @ bedside states \"Pt has had no bm for 4 days.pt was incontinent of small Bm when pt was admitted in ER on 11/14.Pt is on Ferrous Sulfate,will notify CNP for stool softener.  "

## 2023-11-17 NOTE — CARE PLAN
The patient's goals for the shift include Vital sign stable    The clinical goals for the shift include Haemodynamically stable    Over the shift, the patient did not make progress toward the following goals. Barriers to progression include  Problem: Safety - Adult  Goal: Free from fall injury  Outcome: Progressing     Problem: Discharge Planning  Goal: Discharge to home or other facility with appropriate resources  Outcome: Progressing     Problem: Chronic Conditions and Co-morbidities  Goal: Patient's chronic conditions and co-morbidity symptoms are monitored and maintained or improved  Outcome: Progressing    Recommendations to address these barriers include   Problem: Skin  Goal: Participates in plan/prevention/treatment measures  Outcome: Progressing     Problem: Skin  Goal: Prevent/manage excess moisture  Outcome: Progressing     Problem: Skin  Goal: Prevent/minimize sheer/friction injuries  Outcome: Progressing     Problem: Skin  Goal: Promote/optimize nutrition  Outcome: Progressing     Problem: Skin  Goal: Promote skin healing  Outcome: Progressing

## 2023-11-17 NOTE — PROGRESS NOTES
Physical Therapy                 Therapy Communication Note    Patient Name: Angela Ayala  MRN: [id number]  Today's Date: 11/17/2023     Discipline: Physical Therapy    Missed Visit Reason: Missed Visit Reason: Cancel (hospice meeting is being arranged. will continue with therapy as appropriate pending outcome of meeting.)    Missed Time: Attempt

## 2023-11-17 NOTE — CONSULTS
RN Hospice Note    Angela Ayala is a Hospice Patient.   Hospice terminal diagnosis: heart failure  Physician: Shaan  Visit type: initial    Comments/recommendations: Met with pt, son Gus and dtr Aruna who all agree with philosophy and services and request discharge home with DME. Dr Garduno and Kenya Krishnamurthy CNP agree with POC.     Discharge Planning:  Patient to be discharged to home    The following is to be completed:  Discharge order: yes  State DNR signed by MD: per [facility]  Nursing facility referral/transfer form: NA  Medication reconciliation: yes  PAS/RR or convalescent stay form: NA  Prescriptions for al narcotics/new medications: yes  Transportation: yes-[facility] will set up  Other: NA    Plan of care reviewed with patient/family members Raquel   Plan of care reviewed with hospital staff members: Perry JENNINGS and Sherrie DUPONT     Please notify [facility] of any changes in condition. Thank you.  Office: [phone] (8 am-6:30 pm M-F and 8 am-4:30 pm weekends and holidays)   [phone] (6:30 pm-8 am M-F and 4:30 pm-8 am weekends and holidays)    Loreta Navarro, DICK    Reason For Consult  Comfort care    History Of Present Illness  Angela Ayala is a 89 y.o. female presenting with heart failure.     Past Medical History  She has a past medical history of Age-related nuclear cataract, right eye (10/09/2019), Age-related nuclear cataract, right eye (10/09/2019), Atrial fibrillation (CMS/[facility], located within [facility]), CHF (congestive heart failure) (CMS/[facility]AC, located within [facility]), Chronic kidney disease, Dyslipidemia, GERD (gastroesophageal reflux disease), Glaucoma, Heart disease, Hypertension, Myocardial infarction (CMS/HCC), Osteoporosis, Other conditions influencing health status, Other conditions influencing health status, Other conditions influencing health status, Personal history of other diseases of the nervous system and sense organs (08/12/2019), Personal history of other diseases of the nervous system and sense  "organs (10/09/2019), Personal history of transient ischemic attack (TIA), and cerebral infarction without residual deficits (08/12/2019), Stroke (CMS/HCC), and Unspecified cataract.    Surgical History  She has a past surgical history that includes Other surgical history (08/27/2014); Other surgical history (07/19/2013); Other surgical history (07/19/2013); MR angio head wo IV contrast (04/26/2012); and Knee surgery (Bilateral).     Social History  She reports that she has never smoked. She has never used smokeless tobacco. She reports that she does not use drugs. No history on file for alcohol use.    Family History  Family History   Problem Relation Name Age of Onset    Colon cancer Mother      Stroke Father      Glaucoma Father          Allergies  Penicillins    Review of Systems  NA     Physical Exam  NA     Last Recorded Vitals  Blood pressure 99/59, pulse 88, temperature 36.3 °C (97.3 °F), temperature source Axillary, resp. rate 16, height 1.6 m (5' 3\"), weight 89 kg (196 lb 3.4 oz), SpO2 92 %.    Relevant Results  NA     Assessment/Plan     See above        Loreta Navarro RN    "

## 2023-11-18 VITALS
SYSTOLIC BLOOD PRESSURE: 121 MMHG | BODY MASS INDEX: 34.77 KG/M2 | DIASTOLIC BLOOD PRESSURE: 58 MMHG | WEIGHT: 196.21 LBS | HEIGHT: 63 IN | TEMPERATURE: 97.2 F | OXYGEN SATURATION: 99 % | RESPIRATION RATE: 19 BRPM | HEART RATE: 71 BPM

## 2023-11-18 PROCEDURE — 2500000004 HC RX 250 GENERAL PHARMACY W/ HCPCS (ALT 636 FOR OP/ED): Performed by: NURSE PRACTITIONER

## 2023-11-18 PROCEDURE — 2500000004 HC RX 250 GENERAL PHARMACY W/ HCPCS (ALT 636 FOR OP/ED): Performed by: INTERNAL MEDICINE

## 2023-11-18 PROCEDURE — 2500000002 HC RX 250 W HCPCS SELF ADMINISTERED DRUGS (ALT 637 FOR MEDICARE OP, ALT 636 FOR OP/ED): Performed by: NURSE PRACTITIONER

## 2023-11-18 PROCEDURE — 2500000001 HC RX 250 WO HCPCS SELF ADMINISTERED DRUGS (ALT 637 FOR MEDICARE OP): Performed by: NURSE PRACTITIONER

## 2023-11-18 PROCEDURE — 2500000001 HC RX 250 WO HCPCS SELF ADMINISTERED DRUGS (ALT 637 FOR MEDICARE OP): Performed by: INTERNAL MEDICINE

## 2023-11-18 RX ORDER — CEFUROXIME AXETIL 250 MG/1
250 TABLET ORAL 2 TIMES DAILY
Qty: 4 TABLET | Refills: 0 | Status: SHIPPED | OUTPATIENT
Start: 2023-11-18 | End: 2023-11-20

## 2023-11-18 RX ORDER — DIGOXIN 125 MCG
125 TABLET ORAL EVERY OTHER DAY
Qty: 15 TABLET | Refills: 1 | Status: SHIPPED | OUTPATIENT
Start: 2023-11-18 | End: 2024-01-17

## 2023-11-18 RX ORDER — CEFUROXIME AXETIL 250 MG/1
250 TABLET ORAL 2 TIMES DAILY
Status: DISCONTINUED | OUTPATIENT
Start: 2023-11-18 | End: 2023-11-18 | Stop reason: HOSPADM

## 2023-11-18 RX ORDER — MIDODRINE HYDROCHLORIDE 10 MG/1
10 TABLET ORAL
Qty: 90 TABLET | Refills: 1 | Status: SHIPPED | OUTPATIENT
Start: 2023-11-18

## 2023-11-18 RX ORDER — FUROSEMIDE 10 MG/ML
20 INJECTION INTRAMUSCULAR; INTRAVENOUS ONCE
Status: COMPLETED | OUTPATIENT
Start: 2023-11-18 | End: 2023-11-18

## 2023-11-18 RX ORDER — AMMONIUM LACTATE 12 G/100G
1 LOTION TOPICAL 2 TIMES DAILY
Qty: 225 G | Refills: 0 | Status: SHIPPED | OUTPATIENT
Start: 2023-11-18

## 2023-11-18 RX ORDER — FUROSEMIDE 20 MG/1
20 TABLET ORAL DAILY
Qty: 30 TABLET | Refills: 0 | Status: SHIPPED | OUTPATIENT
Start: 2023-11-18

## 2023-11-18 RX ORDER — ACETAMINOPHEN 325 MG/1
650 TABLET ORAL EVERY 6 HOURS PRN
Refills: 0
Start: 2023-11-18

## 2023-11-18 RX ADMIN — APIXABAN 2.5 MG: 2.5 TABLET, FILM COATED ORAL at 10:19

## 2023-11-18 RX ADMIN — FUROSEMIDE 20 MG: 10 INJECTION, SOLUTION INTRAMUSCULAR; INTRAVENOUS at 10:21

## 2023-11-18 RX ADMIN — MIDODRINE HYDROCHLORIDE 10 MG: 10 TABLET ORAL at 10:19

## 2023-11-18 RX ADMIN — ACETAMINOPHEN 650 MG: 325 TABLET ORAL at 04:31

## 2023-11-18 RX ADMIN — AMIODARONE HYDROCHLORIDE 200 MG: 200 TABLET ORAL at 10:19

## 2023-11-18 RX ADMIN — CEFTRIAXONE SODIUM 1 G: 1 INJECTION, SOLUTION INTRAVENOUS at 05:59

## 2023-11-18 RX ADMIN — DIGOXIN 125 MCG: 125 TABLET ORAL at 10:19

## 2023-11-18 RX ADMIN — Medication 1 APPLICATION: at 10:35

## 2023-11-18 RX ADMIN — ASPIRIN 81 MG: 81 TABLET, COATED ORAL at 10:19

## 2023-11-18 ASSESSMENT — PAIN SCALES - GENERAL
PAINLEVEL_OUTOF10: 4
PAINLEVEL_OUTOF10: 0 - NO PAIN

## 2023-11-18 ASSESSMENT — PAIN - FUNCTIONAL ASSESSMENT
PAIN_FUNCTIONAL_ASSESSMENT: 0-10
PAIN_FUNCTIONAL_ASSESSMENT: 0-10

## 2023-11-18 ASSESSMENT — PAIN DESCRIPTION - LOCATION: LOCATION: FOOT

## 2023-11-18 ASSESSMENT — PAIN DESCRIPTION - ORIENTATION: ORIENTATION: RIGHT;LEFT

## 2023-11-18 NOTE — PROGRESS NOTES
Per previous care coordinator, patient is planned to discharge home with hospice services today.  Awaiting update from hospice regarding discharge planning at this time.      UPDATE 1326:  Received update from Nichol with [facility] , patient is scheduled to transport home with their services at 1730.

## 2023-11-18 NOTE — CARE PLAN
Problem: Skin  Goal: Promote/optimize nutrition  Outcome: Progressing     Problem: Skin  Goal: Prevent/minimize sheer/friction injuries  Outcome: Progressing   The patient's goals for the shift include Vital sign stable    The clinical goals for the shift include maintain BP

## 2023-11-18 NOTE — CARE PLAN
The patient's goals for the shift include Vital sign stable    The clinical goals for the shift include maintain BP    Problem: Safety - Adult  Goal: Free from fall injury  11/18/2023 0719 by Amy Jasso RN  Outcome: Progressing  11/18/2023 0717 by Amy Jasso RN  Outcome: Progressing  11/18/2023 0716 by Amy Jasso RN  Outcome: Progressing     Problem: Skin  Goal: Prevent/minimize sheer/friction injuries  11/18/2023 0719 by Amy Jasso RN  Outcome: Progressing  Flowsheets (Taken 11/18/2023 0719)  Prevent/minimize sheer/friction injuries:   Complete micro-shifts as needed if patient unable. Adjust patient position to relieve pressure points, not a full turn   HOB 30 degrees or less   Increase activity/out of bed for meals   Turn/reposition every 2 hours/use positioning/transfer devices  11/18/2023 0717 by Amy Jasso RN  Outcome: Progressing  Flowsheets (Taken 11/18/2023 0717)  Prevent/minimize sheer/friction injuries:   Complete micro-shifts as needed if patient unable. Adjust patient position to relieve pressure points, not a full turn   HOB 30 degrees or less   Increase activity/out of bed for meals   Turn/reposition every 2 hours/use positioning/transfer devices   Use pull sheet   Utilize specialty bed per algorithm  11/18/2023 0716 by Amy Jasso RN  Outcome: Progressing

## 2023-11-18 NOTE — CARE PLAN
Problem: Skin  Goal: Prevent/manage excess moisture  11/18/2023 0717 by Amy Jasso RN  Outcome: Progressing  11/18/2023 0716 by Amy Jasso RN  Outcome: Progressing  11/18/2023 0716 by Amy Jasso RN  Flowsheets (Taken 11/18/2023 0716)  Prevent/manage excess moisture:   Cleanse incontinence/protect with barrier cream   Moisturize dry skin   Use wicking fabric (obtain order)   Follow provider orders for dressing changes   Monitor for/manage infection if present   The patient's goals for the shift include Vital sign stable    The clinical goals for the shift include maintain BP

## 2023-11-18 NOTE — CARE PLAN
Problem: Skin  Goal: Prevent/manage excess moisture  Flowsheets (Taken 11/18/2023 9154)  Prevent/manage excess moisture:   Cleanse incontinence/protect with barrier cream   Moisturize dry skin   Use wicking fabric (obtain order)   Follow provider orders for dressing changes   Monitor for/manage infection if present   The patient's goals for the shift include Vital sign stable    The clinical goals for the shift include maintain BP

## 2023-11-18 NOTE — NURSING NOTE
Assumed care of pt, pt is awake lying in bed, HR is 83 SR on tele, pt has no complaints at this time, on 2LNC, bedside shift report given by DICK Reyes, family at bedside, continue to monitor, call light w/in reach.

## 2023-11-18 NOTE — PROGRESS NOTES
"Angela Mead is a 89 y.o. female on day 4 of admission presenting with Acute on chronic diastolic congestive heart failure (CMS/HCC).      Subjective   Patient seen and examined. Awake/alert/oriented. Resting in bed.  Feels more short of breath this morning.  States that she feels like she is \"suffocating.\"  Pulse ox 90 to 93% on room air.  Placed on supplemental oxygen.  Blood pressure is stable this morning.  Will give Lasix IV x1 dose.         Objective     Last Recorded Vitals  /68 (BP Location: Left arm, Patient Position: Lying)   Pulse 89   Temp 36.3 °C (97.3 °F) (Temporal)   Resp 18   Wt 89 kg (196 lb 3.4 oz)   SpO2 93%   Intake/Output last 3 Shifts:    Intake/Output Summary (Last 24 hours) at 11/18/2023 0813  Last data filed at 11/18/2023 0559  Gross per 24 hour   Intake 300 ml   Output 851 ml   Net -551 ml         Admission Weight  Weight: 89 kg (196 lb 3.4 oz) (11/14/23 1311)    Daily Weight  11/14/23 : 89 kg (196 lb 3.4 oz)    Image Results  Vascular US ankle brachial index (CLARISSA) [place] [address]             Phone [phone]       Vascular Lab Report     [facility] US ANKLE BRACHIAL INDEX (CLARISSA) WITHOUT EXERCISE    Patient Name:      ANGELA MEAD      Reading Physician: [id number] Itzel Carolina MD  Study Date:        11/15/2023          Ordering Provider: [id number] GABE ALVARADO  MRN/PID:           [id number]            Fellow:  Accession#:        [id number]        Technologist:      Marley Mak RVT  Date of Birth/Age: 2/18/1929 / 89      Technologist 2:                     years  Gender:            F                   Encounter#:        [id number]  Admission Status:  Inpatient           Location           [facility]                                         Performed:       Diagnosis/ICD: Peripheral vascular disease, unspecified-I73.9  Indication:    " Peripheral vascular disease  CPT Codes:     88653 Peripheral artery CLARISSA Only       CONCLUSIONS:  Right Lower PVR: Evidence of mild arterial occlusive disease in the right lower extremity at rest. Right pressures of >220 mmHg suggest no compressibility of vessels and may make absolute Segmental Limb Pressures (SLP) unreliable. Decreased digital perfusion noted. Triphasic flow is noted in the right common femoral artery, right posterior tibial artery and right dorsalis pedis artery. Severity of disease called by PVR ankle tracing. Unable to obtain TBI due to flat digital tracing.  Left Lower PVR: Evidence of mild arterial occlusive disease in the left lower extremity at rest. Left pressures of >220 mmHg suggest no compressibility of vessels and may make absolute Segmental Limb Pressures (SLP) unreliable. Decreased digital perfusion noted. Biphasic flow is noted in the left common femoral artery, left posterior tibial artery and left dorsalis pedis artery. Severity of disease called by PVR ankle tracing.     Imaging & Doppler Findings:     RIGHT Lower PVR                Pressures Ratios  Right Posterior Tibial (Ankle) 134 mmHg  1.76  Right Dorsalis Pedis (Ankle)   93 mmHg   1.22          LEFT Lower PVR                Pressures Ratios  Left Posterior Tibial (Ankle) 106 mmHg  1.39  Left Dorsalis Pedis (Ankle)   100 mmHg  1.32  Left Digit (Great Toe)        34 mmHg   0.45                             Right   Left  Brachial Pressure 76 mmHg 76 mmHg          [id number] Itzel Carolina MD  Electronically signed by [id number] Itzel Carolina MD on 11/15/2023 at 6:05:05 PM       ** Final **  Electrocardiogram, 12-lead PRN ACS symptoms  Atrial fibrillation with rapid ventricular response  Left bundle branch block  Abnormal ECG  When compared with ECG of 14-NOV-2023 12:35, (unconfirmed)  No significant change was found  Confirmed by Gus Neely ([id number]) on 11/15/2023 10:27:28 AM  Lower extremity venous duplex bilateral  Narrative: Interpreted  By:  Helio Sparrow,   STUDY:  [facility] US LOWER EXTREMITY VENOUS DUPLEX BILATERAL;  11/14/2023 6:39 pm      INDICATION:  Signs/Symptoms:BLE edema, LLE pain.      COMPARISON:  10/18/2023      ACCESSION NUMBER(S):  [id number]      ORDERING CLINICIAN:  NEHAL MANZANO      TECHNIQUE:  Vascular ultrasound of the bilateral lower extremities was performed.  Real-time compression views as well as Gray scale, color Doppler and  spectral Doppler waveform analysis was performed.      FINDINGS:  Evaluation of the visualized portions of the bilateral common femoral  vein, proximal, mid, and distal femoral vein, and popliteal vein were  performed.  Evaluation of the visualized portions of the  posterior  tibial and peroneal veins were also performed.          The evaluated veins demonstrate normal compressibility. There is  intact venous flow demonstrating normal respiratory variability and  normal augmentation of flow with calf compression. Therefore, there  is no ultrasonographic evidence for deep vein thrombosis within the  evaluated veins. Limited characterization of the calf veins.          Impression: 1. No DVT visualized portions bilateral lower extremities.      MACRO:  None      Signed by: Helio Sparrow 11/15/2023 8:40 AM  Dictation workstation:   [id number]      Physical Exam  Constitutional:       Appearance: Normal appearance.   HENT:      Head: Normocephalic and atraumatic.   Eyes:      Extraocular Movements: Extraocular movements intact.      Conjunctiva/sclera: Conjunctivae normal.   Cardiovascular:      Rate and Rhythm: Normal rate. Rhythm irregular.   Pulmonary:      Effort: Pulmonary effort is normal.      Breath sounds: Rales present. No wheezing or rhonchi.   Abdominal:      General: Bowel sounds are normal.      Tenderness: There is no abdominal tenderness.   Musculoskeletal:      Right lower leg: Edema present.      Left lower leg: Edema present.   Skin:     General: Skin is warm and dry.    Neurological:      General: No focal deficit present.      Mental Status: Mental status is at baseline.         Relevant Results  Lab Results   Component Value Date    GLUCOSE 111 (H) 11/17/2023    CALCIUM 8.1 (L) 11/17/2023     (L) 11/17/2023    K 4.8 11/17/2023    CO2 25 11/17/2023    CL 96 (L) 11/17/2023    BUN 61 (H) 11/17/2023    CREATININE 1.60 11/17/2023     Lab Results   Component Value Date    WBC 9.5 11/16/2023    HGB 14.5 11/16/2023    HCT 45.8 11/16/2023    MCV 93 11/16/2023     11/16/2023     Electrocardiogram, 12-lead PRN ACS symptoms    Result Date: 11/15/2023  Atrial fibrillation with rapid ventricular response Left bundle branch block Abnormal ECG When compared with ECG of 14-NOV-2023 12:35, (unconfirmed) No significant change was found Confirmed by Gus Neely ([id number]) on 11/15/2023 10:27:28 AM    Lower extremity venous duplex bilateral    Result Date: 11/15/2023  Interpreted By:  Helio Sparrow, STUDY: [facility] LOWER EXTREMITY VENOUS DUPLEX BILATERAL;  11/14/2023 6:39 pm   INDICATION: Signs/Symptoms:BLE edema, LLE pain.   COMPARISON: 10/18/2023   ACCESSION NUMBER(S): [id number]   ORDERING CLINICIAN: NEHAL MANZANO   TECHNIQUE: Vascular ultrasound of the bilateral lower extremities was performed. Real-time compression views as well as Gray scale, color Doppler and spectral Doppler waveform analysis was performed.   FINDINGS: Evaluation of the visualized portions of the bilateral common femoral vein, proximal, mid, and distal femoral vein, and popliteal vein were performed.  Evaluation of the visualized portions of the  posterior tibial and peroneal veins were also performed.     The evaluated veins demonstrate normal compressibility. There is intact venous flow demonstrating normal respiratory variability and normal augmentation of flow with calf compression. Therefore, there is no ultrasonographic evidence for deep vein thrombosis within the evaluated veins. Limited  characterization of the calf veins.         1. No DVT visualized portions bilateral lower extremities.   MACRO: None   Signed by: Helio Sparrow 11/15/2023 8:40 AM Dictation workstation:   [id number]    XR knee left 4+ views    Result Date: 11/14/2023  Interpreted By:  Josue Anand, STUDY: XR KNEE LEFT 4+ VIEWS; 11/14/2023 1:29 pm   INDICATION: Signs/Symptoms:fall.   COMPARISON: None available.   ACCESSION NUMBER(S): [id number]   ORDERING CLINICIAN: MICHAEL COOPER   TECHNIQUE: Views: AP lateral, and oblique views of the left knee.   FINDINGS: There is no evidence for fracture or subluxation. The left total knee prosthesis components are intact. No abnormal lucencies. No evidence for a joint effusion. No abnormal radiopaque foreign body.       Normal appearance of left total knee prosthesis. No evidence for an acute fracture or acute osseous abnormality.   Signed by: Josue Anand 11/14/2023 2:08 PM Dictation workstation:   [id number]    XR chest 1 view    Result Date: 11/14/2023  Interpreted By:  Josue Anand, STUDY: XR CHEST 1 VIEW; 11/14/2023 1:29 pm   INDICATION: Signs/Symptoms:sob.   COMPARISON: 09/27/2023   ACCESSION NUMBER(S): [id number]   ORDERING CLINICIAN: MICHAEL COOPER   TECHNIQUE: 1 view of the chest was performed.   FINDINGS: The cardiomediastinal silhouette is mildly enlarged, unchanged. Small-moderate bilateral pleural effusions, similar to the prior study. Mild prominence of the interstitium suggest congestive changes with probable mild pulmonary edema. Bibasilar airspace opacities presumed atelectasis adjacent to the pleural effusions although pneumonia should be clinically excluded.       Small-moderate bilateral pleural effusions. Bilateral airspace opacity in the lower lobes most likely atelectasis although pneumonia should be clinically excluded. Congestive changes with probable mild pulmonary edema.   Signed by: Josue Anand 11/14/2023 2:02 PM Dictation workstation:    [id number]    CT head wo IV contrast    Result Date: 11/14/2023  Interpreted By:  Josue Anand, STUDY: MICHAEL COOPER; 11/14/2023 1:06 pm   INDICATION: fall;   COMPARISON: None   ACCESSION NUMBER(S): [id number]   ORDERING CLINICIAN: MICHAEL COOPER   TECHNIQUE: Contiguous axial images were acquired from the vertex through the posterior fossa without IV contrast. All CT examinations are performed with 1 or more of the following dose reduction techniques: Automated exposure control, adjustment of mA and/or kv according to patient's size, or use of iterative reconstruction techniques.   FINDINGS: No focal mass effect or midline shift is identified. The ventricles and sulci are symmetric and appropriate for the patient's age.   Moderate degree of nonspecific white matter change most consistent with chronic small-vessel ischemic disease. 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 The gray white matter differentiation is preserved.   No acute intracranial hemorrhage is seen. No intra-axial or extra-axial fluid collection is seen.   The visualized paranasal sinuses and mastoid air cells are clear.       No CT evidence for acute intracranial pathology.   Moderate degree of nonspecific white matter change most consistent with chronic small-vessel ischemic disease.   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   Signed by: Josue Anand 11/14/2023 1:55 PM Dictation workstation:   [id number]    CT cervical spine wo IV contrast    Result Date: 11/14/2023  Interpreted By:  Josue Anand, STUDY: CT CERVICAL SPINE WO IV CONTRAST; 11/14/2023 1:06 pm   INDICATION: Signs/Symptoms:fall;   COMPARISON: None   ACCESSION NUMBER(S): [id number]   ORDERING CLINICIAN: MICHAEL COOPER   TECHNIQUE: Contiguous axial images were acquired from the skull base to the lung apices. Coronal and sagittal reformatted images were obtained.  All CT examinations are performed with 1 or more of the following dose reduction techniques: Automated exposure control, adjustment of mA and/or kv according to patient's size, or use of iterative reconstruction techniques.   FINDINGS: There is a normal cervical lordosis. No acute fracture or traumatic malalignment is identified. Minimal degenerative appearing anterolisthesis of C6 on C7, proximally 2 mm. There is also trace degenerative anterolisthesis of C7 on T1. Facet degenerative changes and uncovertebral joint degenerative changes are present.     The occipital condyles, arch of C1, and the odontoid processes are intact. The atlantoaxial relationship is maintained with degenerative changes and calcified pannus formation.   There is moderate disc space narrowing at C3-4. Severe disc space narrowing at C4-5, C5-6, and C6-7. No evidence for high-grade bony spinal canal stenosis. However there is multilevel high-grade bilateral neural foramina stenosis.   The visualized lung apices are unremarkable.       1. No acute fracture or traumatic malalignment. 2. Degenerative disc disease and spondylosis as described in the body of the report.     Signed by: Josue Anand 11/14/2023 1:53 PM Dictation workstation:   [id number]    Vascular US lower extremity venous duplex bilateral    Result Date: 10/18/2023  Interpreted By:  Ricky Whitehead, STUDY: [facility] US LOWER EXTREMITY VENOUS DUPLEX BILATERAL; 10/18/2023 9:31 pm   INDICATION: Signs/Symptoms:swelling.   COMPARISON: None.   ACCESSION NUMBER(S): [id number]   ORDERING CLINICIAN: KOJO MCCLELLAN   TECHNIQUE: 2D grayscale and color-flow duplex images were obtained along with Doppler spectral waveform analysis of the deep venous system of the lower extremity from the groin to the knee. Attempts were made to image the calf veins. Venous compression and augmentation were performed.   FINDINGS: Right Lower Extremity: There is normal compressibility and flow within the  visualized vessels of the deep venous system of this lower extremity.   Left Lower Extremity: There is normal compressibility and flow within the visualized vessels of the deep venous system of this lower extremity.         No evidence for deep venous thrombosis within the limits of this examination.   Signed by: Ricky Whitehead 10/18/2023 9:41 PM Dictation workstation:   [id number]     CLARISSA: Right Lower PVR: Evidence of mild arterial occlusive disease in the right lower extremity at rest. Right pressures of >220 mmHg suggest no compressibility of vessels and may make absolute Segmental Limb Pressures (SLP) unreliable. Decreased digital perfusion noted. Triphasic flow is noted in the right common femoral artery, right posterior tibial artery and right dorsalis pedis artery. Severity of disease called by PVR ankle tracing. Unable to obtain TBI due to flat digital tracing.  Left Lower PVR: Evidence of mild arterial occlusive disease in the left lower extremity at rest. Left pressures of >220 mmHg suggest no compressibility of vessels and may make absolute Segmental Limb Pressures (SLP) unreliable. Decreased digital perfusion noted. Biphasic flow is noted in the left common femoral artery, left posterior tibial artery and left dorsalis pedis artery. Severity of disease called by PVR ankle tracing.       Assessment/Plan      Principal Problem:    Acute on chronic diastolic congestive heart failure (CMS/HCC)  Active Problems:    Hyperlipidemia    Hypertension    Atrial fibrillation with RVR (CMS/HCC)    Atrial fibrillation with RVR  Rate controlled  Continue amiodarone and digoxin per cardiology recommendations    Hypotension  Blood pressure stable  Continue midodrine     Hyperkalemia-resolved  Resolved     Acute on Chronic Systolic Heart Failure  BNP elevated 85709  9/25/23 Echocardiogram showed EF 25-30%  Follows with Dr. Johnson outpatient  More short of breath this morning, states she feels like she has  "\"suffocating\"  Blood pressure stable  We will give IV Lasix x1 dose today  will consider sending her home on a low-dose of Lasix for comfort    Acute UTI  Transition to oral Ceftin     Bilateral Lower Extremity Edema  LLE with erythema and warmth  US BLE negative for DVT  Noted purple discolor to toes, ABIs ordered and vascular surgery consulted  CLARISSA results as above  Vascular surgery  recommending light compression, keep toes of the right foot warm, recommending arterial study, son does not want aggressive treatment, vascular to speak with the son if there is a need for CTA.      Fall  Patient fell on Sunday, laid on the floor for 12 hours, there was a concern for rhabdo, CK today 37, 29, 39, will hold off on fluids   Fall precautions  PT/OT     CAD   Continue ASA/statin  Troponin elevated at 30, 29, 30, 34  Monitor on tele  Cardiology is on consult     CKD  Creatinine 1.7, 1.6   Monitor renal function closely  Avoid nephrotoxic medications  Renally dose meds  Nephrology on consult     Hyperlipidemia  Statin     Hyperglycemia/DM II, not previously diagnosed  Glucose 190  Will check hgbA1C, 6.8, diabetes educator consulted, family was educated and son did not feel she needed treatment for her diabetes at the age of 89 per diabetic educator note  Blood glucose has been stable, will stop sliding scale and accuchecks     Plan  Give IV Lasix x1 dose, consider discharge home on low-dose of oral Lasix  Supplemental oxygen for comfort  Transitioned to oral antibiotics  DVT prophylaxis: Eliquis  Discharge home today with hospice     CODE STATUS: Reviewed with the patient and family. She is a DNRCCA DNI.  Plan to discharge to comfort care model today, family and patient wish for the patient to return home with hospice              Megha Wright, APRN-CNP      "

## 2023-11-18 NOTE — NURSING NOTE
RN Hospice Note    Angela Ayala is a Hospice Patient.   Hospice terminal diagnosis: heart failure  Physician: Dr. Arlin Garduno, Megha Wright CNP, Lina Edgar CNP   Visit type: Discharge visit     Discharge Planning:  Patient to be discharged to home [id number] [facility] [id number]     Plan of care reviewed with patient/family members   - Assessed patient at bedside: sleepy, Ox3, on 2L NC, had episode of shortness of breath this AM; received IV lasix with positive result. No c/o pain. Mitchell in place.  - Met with patient and son, Gus, at bedside. Reviewed discharge home today with [facility] services. Both in agreement. Reviewed medications, transport, and to call [facility] when patient arrives home. Pt/family aware and agreeable. Provide with discharge instructions, written med lists, and starter supply kit.    Plan of care reviewed with hospital staff members:   - Megha Wright CNP updated and placed discharge order. Updated on discharge time.   - Lina Edgar CNP updated.   - DICK Stevens and KIAH Carvalho updated.     [facility] . Ambulance forms left with .   Please remove IV prior to discharge. Leave mitchell and oxygen.      Please notify [facility] of any changes in condition. Thank you.  Office: [phone] (8 am-6:30 pm M-F and 8 am-4:30 pm weekends and holidays)   [phone] (6:30 pm-8 am M-F and 4:30 pm-8 am weekends and holidays)    Nichol Chaparro RN  [phone]

## 2023-11-20 ENCOUNTER — DOCUMENTATION (OUTPATIENT)
Dept: CASE MANAGEMENT | Facility: HOSPITAL | Age: 88
End: 2023-11-20

## 2023-11-20 ENCOUNTER — APPOINTMENT (OUTPATIENT)
Dept: PRIMARY CARE | Facility: CLINIC | Age: 88
End: 2023-11-20
Payer: MEDICARE

## 2023-11-20 NOTE — PROGRESS NOTES
I am closing this case as patient has been discharged home under the care of [facility]. She is no longer appropriate for HF Nurse Navigator program.

## 2023-11-22 ENCOUNTER — HOSPITAL ENCOUNTER (OUTPATIENT)
Dept: CARDIOLOGY | Facility: HOSPITAL | Age: 88
Discharge: HOME | End: 2023-11-22
Payer: MEDICARE

## 2023-11-22 LAB
ATRIAL RATE: 99 BPM
P OFFSET: 220 MS
P ONSET: 177 MS
PR INTERVAL: 90 MS
Q ONSET: 222 MS
QRS COUNT: 17 BEATS
QRS DURATION: 112 MS
QT INTERVAL: 352 MS
QTC CALCULATION(BAZETT): 451 MS
QTC FREDERICIA: 415 MS
R AXIS: 190 DEGREES
T AXIS: 210 DEGREES
T OFFSET: 398 MS
VENTRICULAR RATE: 99 BPM

## 2023-11-22 PROCEDURE — 93005 ELECTROCARDIOGRAM TRACING: CPT

## 2023-11-29 ENCOUNTER — DOCUMENTATION (OUTPATIENT)
Dept: CARE COORDINATION | Facility: CLINIC | Age: 88
End: 2023-11-29
Payer: MEDICARE

## 2023-11-29 NOTE — PROGRESS NOTES
Admitted to [facility] 11/14/23 to 11/18/23 with mechanical fall with inability to get up x 12 hours.   Found to be in atrial fib with RVR with heart failure in ED, but unable to diurese due to hypotension.    Family elected hospice care.   Appt with cardiology 12/6   PMH: HTN HLD atrial fib, diastolic heart failure stroke.  No outreach indicated at this time.    Susan MORRISON RN CCM  RN Care Coordinator  [facility]  Phone [phone]

## 2023-12-06 ENCOUNTER — APPOINTMENT (OUTPATIENT)
Dept: CARDIOLOGY | Facility: CLINIC | Age: 88
End: 2023-12-06
Payer: MEDICARE

## 2023-12-29 NOTE — ED PROVIDER NOTES
HPI   No chief complaint on file.      HPI  See MDM                  No data recorded                Patient History   Past Medical History:   Diagnosis Date    Age-related nuclear cataract, right eye 10/09/2019    Age-related nuclear cataract, right eye    Age-related nuclear cataract, right eye 10/09/2019    Age-related nuclear cataract of right eye    Atrial fibrillation (CMS/HCC)     CHF (congestive heart failure) (CMS/HCC)     Chronic kidney disease     Dyslipidemia     GERD (gastroesophageal reflux disease)     Glaucoma     Heart disease     Hypertension     Myocardial infarction (CMS/HCC)     Osteoporosis     Other conditions influencing health status     Mammogram    Other conditions influencing health status     DEXA Body Composition Study    Other conditions influencing health status     Colonoscopy (Fiberoptic)    Personal history of other diseases of the nervous system and sense organs 08/12/2019    History of hearing loss    Personal history of other diseases of the nervous system and sense organs 10/09/2019    History of cataract    Personal history of transient ischemic attack (TIA), and cerebral infarction without residual deficits 08/12/2019    History of cerebrovascular accident    Stroke (CMS/HCC)     Unspecified cataract     Cataract of left eye     Past Surgical History:   Procedure Laterality Date    KNEE SURGERY Bilateral     MR HEAD ANGIO WO IV CONTRAST  04/26/2012    MR HEAD ANGIO WO IV CONTRAST [facility]    OTHER SURGICAL HISTORY  08/27/2014    Laser Suite Retina Treatment Consisted Of    OTHER SURGICAL HISTORY  07/19/2013    Reported Hx Of Knee Replacement    OTHER SURGICAL HISTORY  07/19/2013    Remove Cataract, Corneo-Scleral Section Left Eye     Family History   Problem Relation Name Age of Onset    Colon cancer Mother      Stroke Father      Glaucoma Father       Social History     Tobacco Use    Smoking status: Never    Smokeless tobacco: Never   Substance Use Topics     Alcohol use: Not on file     Comment: occasional    Drug use: Never       Physical Exam   ED Triage Vitals   Temp Heart Rate Resp BP   11/14/23 1230 11/14/23 1230 11/14/23 1230 11/14/23 1230   36.9 °C (98.4 °F) (!) 125 18 (!) 130/97      SpO2 Temp Source Heart Rate Source Patient Position   11/14/23 1533 11/14/23 1818 11/14/23 1818 11/15/23 0000   99 % Oral Monitor Lying      BP Location FiO2 (%)     11/15/23 0000 --     Right arm        Physical Exam  See MDM  ED Course & MDM   Diagnoses as of 12/29/23 0107   Acute on chronic diastolic congestive heart failure (CMS/HCC)       Medical Decision Making  89-year-old female with past medical history of CHF, A-fib on Eliquis, hyperlipidemia, CAD and GERD presenting to the emergency room with complaints of left-sided knee pain.  Patient is poor historian.  According to family patient had a fall on Sunday and laid about 12 hours on the floor before family was able to find her.  Family also notes that she has been having worsening swelling in her lower extremities but has poor compliance of Lasix.  She reports shortness of breath on exertion but denies chest pain, fevers, chills, nausea, vomiting, abdominal pain.    ED Triage Vitals   Temp Heart Rate Resp BP   11/14/23 1230 11/14/23 1230 11/14/23 1230 11/14/23 1230   36.9 °C (98.4 °F) (!) 125 18 (!) 130/97      SpO2 Temp Source Heart Rate Source Patient Position   11/14/23 1533 11/14/23 1818 11/14/23 1818 11/15/23 0000   99 % Oral Monitor Lying      BP Location FiO2 (%)     11/15/23 0000 --     Right arm        Vital signs reviewed: Patient is afebrile, tachycardic and irregular, normotensive, 99% on room air.    Exam     Constitutional: No acute distress. Resting comfortably.   Head: Normocephalic, atraumatic.   Eyes: Pupils equal bilaterally, EOM grossly intact, conjunctiva normal.  Mouth/Throat: Oropharynx is clear, moist mucus membranes.   Neck: Supple. No lymphadenopathy.  Cardiovascular: Tachycardic rate and irregular  rhythm. Extremities are well-perfused.   Pulmonary/Chest: No respiratory distress, breathing comfortably on room air.    Abdominal: Soft, non-tender, non-distended. No rebound or guarding.   Musculoskeletal: No lower extremity edema.       Skin: Warm, dry, and intact.   Neurological: Patient is oriented to person, place, time, and situation. Face symmetric, hearing intact to voice, speech normal. Moves all extremities.   Psych: Mood, affect, thought content, and judgment normal.    Differential includes but is not limited to:  Poorly controlled A-fib versus hypoxemia secondary to ACS versus pneumonia versus viral illness versus CHF exacerbation    Amount and/or Complexity of Data Reviewed  External Data Reviewed: notes.      Labs: ordered.      Radiology: ordered and independent interpretation performed.  Chest x-ray as interpreted by me shows no large pneumothorax at this time.    ECG/medicine tests: ordered and independent interpretation performed.  Diagnoses as of 12/29/23 0107   Acute on chronic diastolic congestive heart failure (CMS/HCC)     Lab work as interpreted by me shows no significant leukocytosis or anemia on CBC and comprehensive metabolic panel shows hyperkalemia in the setting of mild [place] to 1.7.  Patient treated with hyperkalemia cocktail at this time.  No acute EKG findings at this time.  Patient with mildly elevated lactate at this time but otherwise CK within normal limits and proBNP noted to be 22,000 at this time consistent with likely CHF exacerbation.    Discussion of management or test interpretation with external provider(s):  Patient presentation discussed with admitting physician and will admit at this time for CHF exacerbation.  Patient given Lasix here in the emergency room.  PATIENT REFERRED TO:  Linda Prather PA-C  [id number]  [facility] [address]  [phone]          [place]  [id number]  [facility] [address]  [phone]                DISCHARGE MEDICATIONS:  Discharge Medication List as of 11/18/2023 12:42 PM        START taking these medications    Details   acetaminophen (Tylenol) 325 mg tablet Take 2 tablets (650 mg) by mouth every 6 hours if needed for mild pain (1 - 3)., Starting Sat 11/18/2023, No Print      ammonium lactate (Lac-Hydrin) 12 % lotion Apply 1 Application topically 2 times a day., Starting Sat 11/18/2023, Normal      cefuroxime (Ceftin) 250 mg tablet Take 1 tablet (250 mg) by mouth 2 times a day for 4 doses., Starting Sat 11/18/2023, Until Mon 11/20/2023, Normal      digoxin (Lanoxin) 125 MCG tablet Take 1 tablet (125 mcg) by mouth every other day., Starting Sat 11/18/2023, Until Wed 1/17/2024, Normal      midodrine (Proamatine) 10 mg tablet Take 1 tablet (10 mg) by mouth 3 times a day with meals., Starting Sat 11/18/2023, Normal      oxygen (O2) gas therapy Inhale 1 each once every 24 hours., Starting Sat 11/18/2023, No Print             Cecil Guadalupe MD  1:07 AM    Attending Emergency Physician  [facility]            Procedure  Procedures     Cecil Guadalupe MD  12/29/23 0145

## 2024-02-06 ENCOUNTER — APPOINTMENT (OUTPATIENT)
Dept: CARDIOLOGY | Facility: CLINIC | Age: 89
End: 2024-02-06
Payer: MEDICARE

## 2024-02-12 NOTE — PROGRESS NOTES
Discussed the importance of returning with any new, worsening, or unrelenting symptoms. Counseled on importance of following up with Ortho and taking medications as directed. Parent understands and agrees with plan. All questions and concerns addressed. No questions or concerns voiced.       Subjective   Angela Ayala is a 89 y.o. female who presents for No chief complaint on file.. Generally doing well. Currently c/o:            12 point ROS reviewed and negative other than as stated in HPI    There were no vitals taken for this visit.  Objective   Physical Exam  Vitals reviewed.   Constitutional:       General: She is not in acute distress.     Appearance: Normal appearance.   HENT:      Head: Normocephalic and atraumatic.   Eyes:      General: No scleral icterus.     Extraocular Movements: Extraocular movements intact.      Conjunctiva/sclera: Conjunctivae normal.      Pupils: Pupils are equal, round, and reactive to light.   Cardiovascular:      Rate and Rhythm: Normal rate and regular rhythm.      Pulses: Normal pulses.      Heart sounds: Normal heart sounds. No murmur heard.     No friction rub. No gallop.   Pulmonary:      Effort: Pulmonary effort is normal.      Breath sounds: Normal breath sounds. No stridor. No wheezing, rhonchi or rales.   Abdominal:      General: Bowel sounds are normal.      Palpations: Abdomen is soft. There is no mass.      Tenderness: There is no abdominal tenderness.   Musculoskeletal:         General: Normal range of motion.      Right lower leg: No edema.      Left lower leg: No edema.   Skin:     General: Skin is warm and dry.   Neurological:      Mental Status: She is oriented to person, place, and time. Mental status is at baseline.      Cranial Nerves: No cranial nerve deficit.      Gait: Gait normal.   Psychiatric:         Mood and Affect: Mood normal.         Behavior: Behavior normal.     Assessment/Plan   Problem List Items Addressed This Visit    None

## 2024-09-11 NOTE — CONSULTS
Care gap updated    Sent to scanning   Reason for Consult   PVD    History Of Present Illness    This is a 89-year-old female with past medical history of atrial fibrillation on Eliquis, systolic heart failure, hyperlipidemia, CAD and GERD who presented to the emergency department for further evaluation of inability to ambulate and left knee pain.  Patient evaluated bedside with her son present.  He states that on Sunday she sustained an unwitnessed fall and was found 12 hours after she fell.  EMS saw the patient and she was feeling better, it was decided she did not need to be taken to the ER.  Yesterday she began complaining of knee pain.  She also had increased swelling of her lower extremities.  Son states that she has chronic swelling of the legs which then causes weeping.  She was noted to be in A-fib RVR in the emergency department.  Apparently she was seen by trauma in the emergency department and they recommended ABIs and vascular surgery consult based on their assessment of difficulty palpating pulses and discolored toes of the right foot..  On bedside exam the toes of her right foot are mottled.  Toes are cool to touch.  She denies pain of the extremities.  She has pitting edema bilateral lower extremities.  Pedal pulses are nonpalpable.  She denies claudication but does not ambulate long distances.  Denies ischemic rest pain.  Awaiting arterial studies this afternoon.  Per nursing, she was given a fluid bolus due to hypotension but has improved since bolus.    Past Medical History  Past Medical History:   Diagnosis Date    Age-related nuclear cataract, right eye 10/09/2019    Age-related nuclear cataract, right eye    Age-related nuclear cataract, right eye 10/09/2019    Age-related nuclear cataract of right eye    Atrial fibrillation (CMS/[facility])     CHF (congestive heart failure) (CMS/[facility])     Chronic kidney disease     Dyslipidemia     GERD (gastroesophageal reflux disease)     Glaucoma     Heart disease     Hypertension     Myocardial  infarction (CMS/HCC)     Osteoporosis     Other conditions influencing health status     Mammogram    Other conditions influencing health status     DEXA Body Composition Study    Other conditions influencing health status     Colonoscopy (Fiberoptic)    Personal history of other diseases of the nervous system and sense organs 08/12/2019    History of hearing loss    Personal history of other diseases of the nervous system and sense organs 10/09/2019    History of cataract    Personal history of transient ischemic attack (TIA), and cerebral infarction without residual deficits 08/12/2019    History of cerebrovascular accident    Stroke (CMS/HCC)     Unspecified cataract     Cataract of left eye         Surgical History  Past Surgical History:   Procedure Laterality Date    KNEE SURGERY Bilateral     MR HEAD ANGIO WO IV CONTRAST  04/26/2012    MR HEAD ANGIO WO IV CONTRAST [facility]    OTHER SURGICAL HISTORY  08/27/2014    Laser Suite Retina Treatment Consisted Of    OTHER SURGICAL HISTORY  07/19/2013    Reported Hx Of Knee Replacement    OTHER SURGICAL HISTORY  07/19/2013    Remove Cataract, Corneo-Scleral Section Left Eye          Social History  Social History     Socioeconomic History    Marital status:      Spouse name: Not on file    Number of children: Not on file    Years of education: Not on file    Highest education level: Not on file   Occupational History    Not on file   Tobacco Use    Smoking status: Never    Smokeless tobacco: Never   Substance and Sexual Activity    Alcohol use: Not on file     Comment: occasional    Drug use: Never    Sexual activity: Not on file   Other Topics Concern    Not on file   Social History Narrative    Not on file     Social Determinants of Health     Financial Resource Strain: Not on file   Food Insecurity: Not on file   Transportation Needs: Not on file   Physical Activity: Not on file   Stress: Not on file   Social Connections: Not on file   Intimate  Partner Violence: Not on file   Housing Stability: Not on file         Family History  Family History   Problem Relation Name Age of Onset    Colon cancer Mother      Stroke Father      Glaucoma Father            Allergies  Allergies   Allergen Reactions    Penicillins Hives         Relevant Results  Results for orders placed or performed during the hospital encounter of 11/14/23 (from the past 24 hour(s))   CBC and Auto Differential   Result Value Ref Range    WBC 5.6 4.4 - 11.3 x10*3/uL    nRBC 0.0 0.0 - 0.0 /100 WBCs    RBC 5.46 (H) 4.00 - 5.20 x10*6/uL    Hemoglobin 16.1 (H) 12.0 - 16.0 g/dL    Hematocrit 51.0 (H) 36.0 - 46.0 %    MCV 93 80 - 100 fL    MCH 29.5 26.0 - 34.0 pg    MCHC 31.6 (L) 32.0 - 36.0 g/dL    RDW 21.0 (H) 11.5 - 14.5 %    Platelets 275 150 - 450 x10*3/uL    Immature Granulocytes %, Automated 0.0 0.0 - 0.9 %    Immature Granulocytes Absolute, Automated 0.00 0.00 - 0.50 x10*3/uL   Comprehensive metabolic panel   Result Value Ref Range    Glucose 190 (H) 65 - 99 mg/dL    Sodium 138 133 - 145 mmol/L    Potassium 6.1 (HH) 3.4 - 5.1 mmol/L    Chloride 100 97 - 107 mmol/L    Bicarbonate 24 24 - 31 mmol/L    Urea Nitrogen 53 (H) 8 - 25 mg/dL    Creatinine 1.70 (H) 0.40 - 1.60 mg/dL    eGFR 28 (L) >60 mL/min/1.73m*2    Calcium 9.4 8.5 - 10.4 mg/dL    Albumin 3.2 (L) 3.5 - 5.0 g/dL    Alkaline Phosphatase 109 35 - 125 U/L    Total Protein 5.8 (L) 5.9 - 7.9 g/dL    AST 20 5 - 40 U/L    Bilirubin, Total 1.4 (H) 0.1 - 1.2 mg/dL    ALT 31 5 - 40 U/L    Anion Gap 14 <=19 mmol/L   NT Pro-BNP   Result Value Ref Range    PROBNP 22,012 (H) 0 - 624 pg/mL   Serial Troponin, Initial ([facility])   Result Value Ref Range    Troponin T, High Sensitivity 30 (H) <=15 ng/L   Manual Differential   Result Value Ref Range    Neutrophils %, Manual 87.0 40.0 - 80.0 %    Bands %, Manual 3.0 0.0 - 5.0 %    Lymphocytes %, Manual 6.0 13.0 - 44.0 %    Monocytes %, Manual 4.0 2.0 - 10.0 %    Eosinophils %, Manual 0.0 0.0 - 6.0 %     Basophils %, Manual 0.0 0.0 - 2.0 %    Seg Neutrophils Absolute, Manual 4.87 1.60 - 5.00 x10*3/uL    Bands Absolute, Manual 0.17 0.00 - 0.50 x10*3/uL    Lymphocytes Absolute, Manual 0.34 (L) 0.80 - 3.00 x10*3/uL    Monocytes Absolute, Manual 0.22 0.05 - 0.80 x10*3/uL    Eosinophils Absolute, Manual 0.00 0.00 - 0.40 x10*3/uL    Basophils Absolute, Manual 0.00 0.00 - 0.10 x10*3/uL    Total Cells Counted 100     Neutrophils Absolute, Manual 5.04 1.60 - 5.50 x10*3/uL    RBC Morphology See Below     RBC Fragments Few     Ovalocytes Few     Kaiden Cells Many     Acanthocytes Many    Creatine Kinase   Result Value Ref Range    Creatine Kinase 37 24 - 195 U/L   Blood Gas Arterial Full Panel   Result Value Ref Range    POCT pH, Arterial 7.44 (H) 7.38 - 7.42 pH    POCT pCO2, Arterial 33 (L) 38 - 42 mm Hg    POCT pO2, Arterial 422 (H) 85 - 95 mm Hg    POCT SO2, Arterial 100 94 - 100 %    POCT Oxy Hemoglobin, Arterial 97.3 94.0 - 98.0 %    POCT Hematocrit Calculated, Arterial 50.0 (H) 36.0 - 46.0 %    POCT Sodium, Arterial 130 (L) 136 - 145 mmol/L    POCT Potassium, Arterial 5.6 (H) 3.5 - 5.3 mmol/L    POCT Chloride, Arterial 101 98 - 107 mmol/L    POCT Ionized Calcium, Arterial 1.13 1.10 - 1.33 mmol/L    POCT Glucose, Arterial 187 (H) 74 - 99 mg/dL    POCT Lactate, Arterial 3.6 (H) 0.4 - 2.0 mmol/L    POCT Base Excess, Arterial -0.9 -2.0 - 3.0 mmol/L    POCT HCO3 Calculated, Arterial 22.4 22.0 - 26.0 mmol/L    POCT Hemoglobin, Arterial 16.6 (H) 12.0 - 16.0 g/dL    POCT Anion Gap, Arterial 12 10 - 25 mmo/L    Patient Temperature 37.0 degrees Celsius    FiO2 85 %    Apparatus NON REBREATHER     Flow 15.0 LPM   Blood Gas Lactic Acid, Venous   Result Value Ref Range    POCT Lactate, Venous 5.1 (HH) 0.4 - 2.0 mmol/L   Serial Troponin, 2 Hour ([facility])   Result Value Ref Range    Troponin T, High Sensitivity 29 (H) <=15 ng/L   Creatine Kinase   Result Value Ref Range    Creatine Kinase 29 24 - 195 U/L   Troponin T   Result Value Ref  Range    Troponin T, High Sensitivity 30 (H) <=15 ng/L   Serial Troponin, 6 Hour ([facility])   Result Value Ref Range    Troponin T, High Sensitivity 34 (H) <=15 ng/L   Basic Metabolic Panel   Result Value Ref Range    Glucose 144 (H) 65 - 99 mg/dL    Sodium 136 133 - 145 mmol/L    Potassium 5.0 3.4 - 5.1 mmol/L    Chloride 96 (L) 97 - 107 mmol/L    Bicarbonate 25 24 - 31 mmol/L    Urea Nitrogen 54 (H) 8 - 25 mg/dL    Creatinine 1.70 (H) 0.40 - 1.60 mg/dL    eGFR 28 (L) >60 mL/min/1.73m*2    Calcium 9.4 8.5 - 10.4 mg/dL    Anion Gap 15 <=19 mmol/L   Troponin T   Result Value Ref Range    Troponin T, High Sensitivity 34 (H) <=15 ng/L   Creatine Kinase   Result Value Ref Range    Creatine Kinase 39 24 - 195 U/L   Urinalysis with Reflex Microscopic and Culture   Result Value Ref Range    Color, Urine Light-Orange (N) Light-Yellow, Yellow, Dark-Yellow    Appearance, Urine Turbid (N) Clear    Specific Gravity, Urine 1.020 1.005 - 1.035    pH, Urine 5.0 5.0, 5.5, 6.0, 6.5, 7.0, 7.5, 8.0    Protein, Urine 20 (TRACE) NEGATIVE, 10 (TRACE), 20 (TRACE) mg/dL    Glucose, Urine Normal Normal mg/dL    Blood, Urine 1.0 (3+) (A) NEGATIVE    Ketones, Urine NEGATIVE NEGATIVE mg/dL    Bilirubin, Urine NEGATIVE NEGATIVE    Urobilinogen, Urine Normal Normal mg/dL    Nitrite, Urine NEGATIVE NEGATIVE    Leukocyte Esterase, Urine 75 Hemanth/µL (A) NEGATIVE   Extra Urine Gray Tube   Result Value Ref Range    Extra Tube Hold for add-ons.    Microscopic Only, Urine   Result Value Ref Range    WBC, Urine 11-20 (A) 1-5, NONE /HPF    RBC, Urine >20 (A) NONE, 1-2, 3-5 /HPF    Squamous Epithelial Cells, Urine 1-9 (SPARSE) Reference range not established. /HPF    Bacteria, Urine 1+ (A) NONE SEEN /HPF    Mucus, Urine 1+ Reference range not established. /LPF    Hyaline Casts, Urine 4+ (A) NONE /LPF    Fine Granular Casts, Urine 1+ (A) NONE /LPF   Magnesium   Result Value Ref Range    Magnesium 2.40 1.60 - 3.10 mg/dL   CBC   Result Value Ref Range    WBC 7.4  4.4 - 11.3 x10*3/uL    nRBC 0.0 0.0 - 0.0 /100 WBCs    RBC 5.08 4.00 - 5.20 x10*6/uL    Hemoglobin 14.8 12.0 - 16.0 g/dL    Hematocrit 47.1 (H) 36.0 - 46.0 %    MCV 93 80 - 100 fL    MCH 29.1 26.0 - 34.0 pg    MCHC 31.4 (L) 32.0 - 36.0 g/dL    RDW 19.9 (H) 11.5 - 14.5 %    Platelets 242 150 - 450 x10*3/uL   Hemoglobin A1C   Result Value Ref Range    Hemoglobin A1C 6.8 (H) See below %    Estimated Average Glucose 148 Not Established mg/dL   Basic Metabolic Panel   Result Value Ref Range    Glucose 120 (H) 65 - 99 mg/dL    Sodium 136 133 - 145 mmol/L    Potassium 5.3 (H) 3.4 - 5.1 mmol/L    Chloride 98 97 - 107 mmol/L    Bicarbonate 23 (L) 24 - 31 mmol/L    Urea Nitrogen 56 (H) 8 - 25 mg/dL    Creatinine 1.70 (H) 0.40 - 1.60 mg/dL    eGFR 28 (L) >60 mL/min/1.73m*2    Calcium 9.2 8.5 - 10.4 mg/dL    Anion Gap 15 <=19 mmol/L   Electrocardiogram, 12-lead PRN ACS symptoms   Result Value Ref Range    Ventricular Rate 116 BPM    QRS Duration 126 ms    QT Interval 352 ms    QTC Calculation(Bazett) 489 ms    R Axis 154 degrees    T Axis -80 degrees    QRS Count 19 beats    Q Onset 216 ms    T Offset 392 ms    QTC Fredericia 438 ms   POCT GLUCOSE   Result Value Ref Range    POCT Glucose 96 74 - 99 mg/dL     Electrocardiogram, 12-lead PRN ACS symptoms    Result Date: 11/15/2023  Atrial fibrillation with rapid ventricular response Left bundle branch block Abnormal ECG When compared with ECG of 14-NOV-2023 12:35, (unconfirmed) No significant change was found Confirmed by Gus Neely ([id number]) on 11/15/2023 10:27:28 AM    Lower extremity venous duplex bilateral    Result Date: 11/15/2023  Interpreted By:  Helio Sparrow, STUDY: [facility] LOWER EXTREMITY VENOUS DUPLEX BILATERAL;  11/14/2023 6:39 pm   INDICATION: Signs/Symptoms:BLE edema, LLE pain.   COMPARISON: 10/18/2023   ACCESSION NUMBER(S): [id number]   ORDERING CLINICIAN: NEHAL MANZANO   TECHNIQUE: Vascular ultrasound of the bilateral lower extremities was performed.  Real-time compression views as well as Gray scale, color Doppler and spectral Doppler waveform analysis was performed.   FINDINGS: Evaluation of the visualized portions of the bilateral common femoral vein, proximal, mid, and distal femoral vein, and popliteal vein were performed.  Evaluation of the visualized portions of the  posterior tibial and peroneal veins were also performed.     The evaluated veins demonstrate normal compressibility. There is intact venous flow demonstrating normal respiratory variability and normal augmentation of flow with calf compression. Therefore, there is no ultrasonographic evidence for deep vein thrombosis within the evaluated veins. Limited characterization of the calf veins.         1. No DVT visualized portions bilateral lower extremities.   MACRO: None   Signed by: Helio Sparrow 11/15/2023 8:40 AM Dictation workstation:   [id number]    XR knee left 4+ views    Result Date: 11/14/2023  Interpreted By:  Josue Anand, STUDY: XR KNEE LEFT 4+ VIEWS; 11/14/2023 1:29 pm   INDICATION: Signs/Symptoms:fall.   COMPARISON: None available.   ACCESSION NUMBER(S): [id number]   ORDERING CLINICIAN: MICHAEL COOPER   TECHNIQUE: Views: AP lateral, and oblique views of the left knee.   FINDINGS: There is no evidence for fracture or subluxation. The left total knee prosthesis components are intact. No abnormal lucencies. No evidence for a joint effusion. No abnormal radiopaque foreign body.       Normal appearance of left total knee prosthesis. No evidence for an acute fracture or acute osseous abnormality.   Signed by: Josue Anand 11/14/2023 2:08 PM Dictation workstation:   [id number]    XR chest 1 view    Result Date: 11/14/2023  Interpreted By:  Josue Anand, STUDY: XR CHEST 1 VIEW; 11/14/2023 1:29 pm   INDICATION: Signs/Symptoms:sob.   COMPARISON: 09/27/2023   ACCESSION NUMBER(S): [id number]   ORDERING CLINICIAN: MICHAEL COOPER   TECHNIQUE: 1 view of the chest was performed.    FINDINGS: The cardiomediastinal silhouette is mildly enlarged, unchanged. Small-moderate bilateral pleural effusions, similar to the prior study. Mild prominence of the interstitium suggest congestive changes with probable mild pulmonary edema. Bibasilar airspace opacities presumed atelectasis adjacent to the pleural effusions although pneumonia should be clinically excluded.       Small-moderate bilateral pleural effusions. Bilateral airspace opacity in the lower lobes most likely atelectasis although pneumonia should be clinically excluded. Congestive changes with probable mild pulmonary edema.   Signed by: Josue Anand 11/14/2023 2:02 PM Dictation workstation:   [id number]    CT head wo IV contrast    Result Date: 11/14/2023  Interpreted By:  Josue Anand, STUDY: MICHAEL COOPER; 11/14/2023 1:06 pm   INDICATION: fall;   COMPARISON: None   ACCESSION NUMBER(S): [id number]   ORDERING CLINICIAN: MICHAEL COOPER   TECHNIQUE: Contiguous axial images were acquired from the vertex through the posterior fossa without IV contrast. All CT examinations are performed with 1 or more of the following dose reduction techniques: Automated exposure control, adjustment of mA and/or kv according to patient's size, or use of iterative reconstruction techniques.   FINDINGS: No focal mass effect or midline shift is identified. The ventricles and sulci are symmetric and appropriate for the patient's age.   Moderate degree of nonspecific white matter change most consistent with chronic small-vessel ischemic disease. 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 The gray white matter differentiation is preserved.   No acute intracranial hemorrhage is seen. No intra-axial or extra-axial fluid collection is seen.   The visualized paranasal sinuses and mastoid air cells are clear.       No CT evidence for acute  intracranial pathology.   Moderate degree of nonspecific white matter change most consistent with chronic small-vessel ischemic disease.   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   Signed by: Josue Anand 11/14/2023 1:55 PM Dictation workstation:   [id number]    CT cervical spine wo IV contrast    Result Date: 11/14/2023  Interpreted By:  Josue Anand, STUDY: CT CERVICAL SPINE WO IV CONTRAST; 11/14/2023 1:06 pm   INDICATION: Signs/Symptoms:fall;   COMPARISON: None   ACCESSION NUMBER(S): [id number]   ORDERING CLINICIAN: MICHAEL COOPER   TECHNIQUE: Contiguous axial images were acquired from the skull base to the lung apices. Coronal and sagittal reformatted images were obtained. All CT examinations are performed with 1 or more of the following dose reduction techniques: Automated exposure control, adjustment of mA and/or kv according to patient's size, or use of iterative reconstruction techniques.   FINDINGS: There is a normal cervical lordosis. No acute fracture or traumatic malalignment is identified. Minimal degenerative appearing anterolisthesis of C6 on C7, proximally 2 mm. There is also trace degenerative anterolisthesis of C7 on T1. Facet degenerative changes and uncovertebral joint degenerative changes are present.     The occipital condyles, arch of C1, and the odontoid processes are intact. The atlantoaxial relationship is maintained with degenerative changes and calcified pannus formation.   There is moderate disc space narrowing at C3-4. Severe disc space narrowing at C4-5, C5-6, and C6-7. No evidence for high-grade bony spinal canal stenosis. However there is multilevel high-grade bilateral neural foramina stenosis.   The visualized lung apices are unremarkable.       1. No acute fracture or traumatic malalignment. 2. Degenerative disc disease and spondylosis as described in the body of the report.     Signed by: Josue Anand 11/14/2023 1:53 PM Dictation workstation:   [id number]    Vascular US  lower extremity venous duplex bilateral    Result Date: 10/18/2023  Interpreted By:  Ricky Whitehead, STUDY: [facility] LOWER EXTREMITY VENOUS DUPLEX BILATERAL; 10/18/2023 9:31 pm   INDICATION: Signs/Symptoms:swelling.   COMPARISON: None.   ACCESSION NUMBER(S): [id number]   ORDERING CLINICIAN: KOJO MCCLELLAN   TECHNIQUE: 2D grayscale and color-flow duplex images were obtained along with Doppler spectral waveform analysis of the deep venous system of the lower extremity from the groin to the knee. Attempts were made to image the calf veins. Venous compression and augmentation were performed.   FINDINGS: Right Lower Extremity: There is normal compressibility and flow within the visualized vessels of the deep venous system of this lower extremity.   Left Lower Extremity: There is normal compressibility and flow within the visualized vessels of the deep venous system of this lower extremity.         No evidence for deep venous thrombosis within the limits of this examination.   Signed by: Ricky Whitehead 10/18/2023 9:41 PM Dictation workstation:   [id number]      Physical exam  Constitutional:  Alert and oriented to person, place.  in no acute distress.  Hard of hearing  HEENT:  Atraumatic, normocephalic. PERRL. EOMI.  Nares patent.  Mucous membranes moist.    Neck:  Trachea midline.  Respiratory:  Clear to auscultation.  Cardiac: Irregular  Cardiovascular: +2 pitting edema of the lower pulses palpable.  Femoral extremities.  Pulse exam: Radial, popliteal and pedal pulses nonpalpable.  DP and PT signal on Doppler.  Abdominal:  Soft, nontender, nondistended, bowel sounds present.  Obese  Musculoskeletal:  Moves extremities freely.  Dermatological: Clean and dry.  Digits 1 through 5 the right foot are mottled.  Slightly cool to touch.  No tenderness with palpation.  Left lower extremity erythema present.  Neurological: Alert and oriented to person, place  Psych:  Calm, cooperative    Assessment and Plan  Peripheral  vascular disease   cyanosis of the toes right foot  Atrial fibrillation on Eliquis    Patient has nonpalpable pedal pulses.  Toes of the right foot are mottled, not tender.  Capillary refill less than 5 seconds.  Recommend arterial study  On Eliquis for A-fib  On aspirin and statin  Keep toes of the right foot warm.  Okay for light compression of the lower extremities due to edema.  Per son, he does not want aggressive treatment.  We will discuss if there is need for CT angio with son, prior to ordering.